# Patient Record
Sex: FEMALE | Race: ASIAN | NOT HISPANIC OR LATINO | Employment: UNEMPLOYED | URBAN - METROPOLITAN AREA
[De-identification: names, ages, dates, MRNs, and addresses within clinical notes are randomized per-mention and may not be internally consistent; named-entity substitution may affect disease eponyms.]

---

## 2018-06-25 ENCOUNTER — OFFICE VISIT (OUTPATIENT)
Dept: URGENT CARE | Facility: URGENT CARE | Age: 58
End: 2018-06-25

## 2018-06-25 VITALS
TEMPERATURE: 98 F | HEART RATE: 96 BPM | DIASTOLIC BLOOD PRESSURE: 80 MMHG | OXYGEN SATURATION: 96 % | RESPIRATION RATE: 24 BRPM | SYSTOLIC BLOOD PRESSURE: 104 MMHG | WEIGHT: 168.38 LBS

## 2018-06-25 DIAGNOSIS — R05.8 PRODUCTIVE COUGH: Primary | ICD-10-CM

## 2018-06-25 PROCEDURE — 99203 OFFICE O/P NEW LOW 30 MIN: CPT | Performed by: FAMILY MEDICINE

## 2018-06-25 RX ORDER — CODEINE PHOSPHATE AND GUAIFENESIN 10; 100 MG/5ML; MG/5ML
1-2 SOLUTION ORAL EVERY 6 HOURS PRN
Qty: 120 ML | Refills: 0 | Status: SHIPPED | OUTPATIENT
Start: 2018-06-25 | End: 2018-06-28

## 2018-06-25 RX ORDER — AZITHROMYCIN 250 MG/1
TABLET, FILM COATED ORAL
Qty: 6 TABLET | Refills: 0 | Status: SHIPPED | OUTPATIENT
Start: 2018-06-25 | End: 2018-06-30

## 2018-06-25 NOTE — PROGRESS NOTES
SUBJECTIVE: Christ Alford is a 58 year old female presenting with a chief complaint of nasal congestion and cough .  Onset of symptoms was 2 week(s) ago.  Course of illness is worsening.    Severity moderate  Current and Associated symptoms: runny nose, stuffy nose and cough - productive  Treatment measures tried include Tylenol/Ibuprofen.  Predisposing factors include None.    No past medical history on file.  No Known Allergies  Social History   Substance Use Topics     Smoking status: Never Smoker     Smokeless tobacco: Never Used     Alcohol use Not on file       ROS:  SKIN: no rash  GI: no vomiting    OBJECTIVE:  /80 (Cuff Size: Adult Regular)  Pulse 96  Temp 98  F (36.7  C)  Resp 24  Wt 168 lb 6 oz (76.4 kg)  SpO2 96%GENERAL APPEARANCE: healthy, alert and no distress  EYES: EOMI,  PERRL, conjunctiva clear  HENT: ear canals and TM's normal.  Nose and mouth without ulcers, erythema or lesions  NECK: supple, nontender, no lymphadenopathy  RESP: lungs clear to auscultation - no rales, rhonchi or wheezes  SKIN: no suspicious lesions or rashes      ICD-10-CM    1. Productive cough R05 azithromycin (ZITHROMAX) 250 MG tablet     guaiFENesin-codeine (ROBITUSSIN AC) 100-10 MG/5ML SOLN solution       Fluids/Rest, f/u if worse/not any better

## 2018-06-25 NOTE — MR AVS SNAPSHOT
After Visit Summary   6/25/2018    Christ Alford    MRN: [id number]           Patient Information     Date Of Birth          1960        Visit Information        Provider Department      6/25/2018 9:05 AM Gary Elliott,  [facility]        Today's Diagnoses     Productive cough    -  1       Follow-ups after your visit        Who to contact     If you have questions or need follow up information about today's clinic visit or your schedule please contact [facility] directly at [phone].  Normal or non-critical lab and imaging results will be communicated to you by MyChart, letter or phone within 4 business days after the clinic has received the results. If you do not hear from us within 7 days, please contact the clinic through MyChart or phone. If you have a critical or abnormal lab result, we will notify you by phone as soon as possible.  Submit refill requests through [employer] or call your pharmacy and they will forward the refill request to us. Please allow 3 business days for your refill to be completed.          Additional Information About Your Visit        Care EveryWhere ID     This is your Care EveryWhere ID. This could be used by other organizations to access your [place] medical records  [id number]        Your Vitals Were     Pulse Temperature Respirations Pulse Oximetry          96 98  F (36.7  C) 24 96%         Blood Pressure from Last 3 Encounters:   06/25/18 104/80    Weight from Last 3 Encounters:   06/25/18 168 lb 6 oz (76.4 kg)              Today, you had the following     No orders found for display         Today's Medication Changes          These changes are accurate as of 6/25/18  9:43 AM.  If you have any questions, ask your nurse or doctor.               Start taking these medicines.        Dose/Directions    azithromycin 250 MG tablet   Commonly known as:  ZITHROMAX   Used for:  Productive cough   Started  by:  Gary Elliott, DO        Two tablets first day, then one tablet daily for four days.   Quantity:  6 tablet   Refills:  0       guaiFENesin-codeine 100-10 MG/5ML Soln solution   Commonly known as:  ROBITUSSIN AC   Used for:  Productive cough   Started by:  Gary Elliott, DO        Dose:  1-2 tsp.   Take 5-10 mLs by mouth every 6 hours as needed for cough   Quantity:  120 mL   Refills:  0            Where to get your medicines      These medications were sent to [facility] [address] [address]     Phone:  [phone]     azithromycin 250 MG tablet         Some of these will need a paper prescription and others can be bought over the counter.  Ask your nurse if you have questions.     Bring a paper prescription for each of these medications     guaiFENesin-codeine 100-10 MG/5ML Soln solution                Primary Care Provider Fax #    Physician No Ref-Primary [phone]       No address on file        Equal Access to Services     CECILE PARSON : Hadii mercedes ku hadasho Soomaali, waaxda luqadaha, qaybta kaalmada adeegyada, waxay idiin hayarmando reyes . So [facility] [phone].    ATENCIÓN: Si habla español, tiene a luis disposición servicios gratuitos de asistencia lingüística. [facility] [phone].    We comply with applicable federal civil rights laws and [place] laws. We do not discriminate on the basis of race, color, national origin, age, disability, sex, sexual orientation, or gender identity.            Thank you!     Thank you for choosing [facility]  for your care. Our goal is always to provide you with excellent care. Hearing back from our patients is one way we can continue to improve our services. Please take a few minutes to complete the written survey that you may receive in the mail after your visit with us. Thank you!             Your Updated Medication List - Protect others around  you: Learn how to safely use, store and throw away your medicines at www.disposemymeds.org.          This list is accurate as of 6/25/18  9:43 AM.  Always use your most recent med list.                   Brand Name Dispense Instructions for use Diagnosis    azithromycin 250 MG tablet    ZITHROMAX    6 tablet    Two tablets first day, then one tablet daily for four days.    Productive cough       guaiFENesin-codeine 100-10 MG/5ML Soln solution    ROBITUSSIN AC    120 mL    Take 5-10 mLs by mouth every 6 hours as needed for cough    Productive cough

## 2018-07-01 ENCOUNTER — RADIANT APPOINTMENT (OUTPATIENT)
Dept: GENERAL RADIOLOGY | Facility: CLINIC | Age: 58
End: 2018-07-01
Attending: FAMILY MEDICINE

## 2018-07-01 ENCOUNTER — OFFICE VISIT (OUTPATIENT)
Dept: URGENT CARE | Facility: URGENT CARE | Age: 58
End: 2018-07-01

## 2018-07-01 VITALS
DIASTOLIC BLOOD PRESSURE: 60 MMHG | SYSTOLIC BLOOD PRESSURE: 110 MMHG | RESPIRATION RATE: 24 BRPM | HEART RATE: 55 BPM | OXYGEN SATURATION: 95 % | HEIGHT: 64 IN | TEMPERATURE: 98.2 F | WEIGHT: 168.6 LBS | BODY MASS INDEX: 28.79 KG/M2

## 2018-07-01 DIAGNOSIS — R05.9 COUGH: Primary | ICD-10-CM

## 2018-07-01 DIAGNOSIS — R10.13 ABDOMINAL PAIN, EPIGASTRIC: ICD-10-CM

## 2018-07-01 DIAGNOSIS — E86.0 DEHYDRATION: ICD-10-CM

## 2018-07-01 DIAGNOSIS — R17 TOTAL BILIRUBIN, ELEVATED: ICD-10-CM

## 2018-07-01 DIAGNOSIS — R11.2 NAUSEA AND VOMITING, INTRACTABILITY OF VOMITING NOT SPECIFIED, UNSPECIFIED VOMITING TYPE: ICD-10-CM

## 2018-07-01 LAB
ALBUMIN SERPL-MCNC: 3.9 G/DL (ref 3.4–5)
ALP SERPL-CCNC: 123 U/L (ref 40–150)
ALT SERPL W P-5'-P-CCNC: 81 U/L (ref 0–50)
AMYLASE SERPL-CCNC: 48 U/L (ref 30–110)
ANION GAP SERPL CALCULATED.3IONS-SCNC: 8 MMOL/L (ref 3–14)
AST SERPL W P-5'-P-CCNC: 41 U/L (ref 0–45)
BASOPHILS # BLD AUTO: 0 10E9/L (ref 0–0.2)
BASOPHILS NFR BLD AUTO: 0.3 %
BILIRUB SERPL-MCNC: 1.4 MG/DL (ref 0.2–1.3)
BUN SERPL-MCNC: 17 MG/DL (ref 7–30)
CALCIUM SERPL-MCNC: 9 MG/DL (ref 8.5–10.1)
CHLORIDE SERPL-SCNC: 112 MMOL/L (ref 94–109)
CO2 SERPL-SCNC: 26 MMOL/L (ref 20–32)
CREAT SERPL-MCNC: 1.06 MG/DL (ref 0.52–1.04)
DIFFERENTIAL METHOD BLD: NORMAL
EOSINOPHIL # BLD AUTO: 0.1 10E9/L (ref 0–0.7)
EOSINOPHIL NFR BLD AUTO: 0.6 %
ERYTHROCYTE [DISTWIDTH] IN BLOOD BY AUTOMATED COUNT: 14.2 % (ref 10–15)
GFR SERPL CREATININE-BSD FRML MDRD: 53 ML/MIN/1.7M2
GLUCOSE SERPL-MCNC: 115 MG/DL (ref 70–99)
HCT VFR BLD AUTO: 45.1 % (ref 35–47)
HGB BLD-MCNC: 14.2 G/DL (ref 11.7–15.7)
LIPASE SERPL-CCNC: 174 U/L (ref 73–393)
LYMPHOCYTES # BLD AUTO: 2.4 10E9/L (ref 0.8–5.3)
LYMPHOCYTES NFR BLD AUTO: 26.5 %
MCH RBC QN AUTO: 30.5 PG (ref 26.5–33)
MCHC RBC AUTO-ENTMCNC: 31.5 G/DL (ref 31.5–36.5)
MCV RBC AUTO: 97 FL (ref 78–100)
MONOCYTES # BLD AUTO: 0.4 10E9/L (ref 0–1.3)
MONOCYTES NFR BLD AUTO: 4.8 %
NEUTROPHILS # BLD AUTO: 6 10E9/L (ref 1.6–8.3)
NEUTROPHILS NFR BLD AUTO: 67.8 %
PLATELET # BLD AUTO: 234 10E9/L (ref 150–450)
POTASSIUM SERPL-SCNC: 5.3 MMOL/L (ref 3.4–5.3)
PROT SERPL-MCNC: 7.6 G/DL (ref 6.8–8.8)
RBC # BLD AUTO: 4.66 10E12/L (ref 3.8–5.2)
SODIUM SERPL-SCNC: 146 MMOL/L (ref 133–144)
WBC # BLD AUTO: 8.9 10E9/L (ref 4–11)

## 2018-07-01 PROCEDURE — 83690 ASSAY OF LIPASE: CPT | Performed by: FAMILY MEDICINE

## 2018-07-01 PROCEDURE — 71046 X-RAY EXAM CHEST 2 VIEWS: CPT | Mod: FY

## 2018-07-01 PROCEDURE — 85025 COMPLETE CBC W/AUTO DIFF WBC: CPT | Performed by: FAMILY MEDICINE

## 2018-07-01 PROCEDURE — 80053 COMPREHEN METABOLIC PANEL: CPT | Performed by: FAMILY MEDICINE

## 2018-07-01 PROCEDURE — 36415 COLL VENOUS BLD VENIPUNCTURE: CPT | Performed by: FAMILY MEDICINE

## 2018-07-01 PROCEDURE — 82150 ASSAY OF AMYLASE: CPT | Performed by: FAMILY MEDICINE

## 2018-07-01 PROCEDURE — 99214 OFFICE O/P EST MOD 30 MIN: CPT | Performed by: FAMILY MEDICINE

## 2018-07-01 RX ORDER — ONDANSETRON 8 MG/1
8 TABLET, FILM COATED ORAL EVERY 8 HOURS PRN
Qty: 15 TABLET | Refills: 0 | Status: SHIPPED | OUTPATIENT
Start: 2018-07-01 | End: 2018-07-06

## 2018-07-01 NOTE — MR AVS SNAPSHOT
"              After Visit Summary   7/1/2018    Christ Servin    MRN: [id number]           Patient Information     Date Of Birth          1960        Visit Information        Provider Department      7/1/2018 9:40 AM Gary Elliott DO [facility]        Today's Diagnoses     Cough    -  1    Nausea and vomiting, intractability of vomiting not specified, unspecified vomiting type        Abdominal pain, epigastric        Total bilirubin, elevated        Dehydration           Follow-ups after your visit        Who to contact     If you have questions or need follow up information about today's clinic visit or your schedule please contact [facility] directly at [phone].  Normal or non-critical lab and imaging results will be communicated to you by MyChart, letter or phone within 4 business days after the clinic has received the results. If you do not hear from us within 7 days, please contact the clinic through MyChart or phone. If you have a critical or abnormal lab result, we will notify you by phone as soon as possible.  Submit refill requests through [employer] or call your pharmacy and they will forward the refill request to us. Please allow 3 business days for your refill to be completed.          Additional Information About Your Visit        Care EveryWhere ID     This is your Care EveryWhere ID. This could be used by other organizations to access your [place] medical records  [id number]        Your Vitals Were     Pulse Temperature Respirations Height Pulse Oximetry BMI (Body Mass Index)    55 98.2  F (36.8  C) 24 5' 4\" (1.626 m) 95% 28.94 kg/m2       Blood Pressure from Last 3 Encounters:   07/01/18 110/60   06/25/18 104/80    Weight from Last 3 Encounters:   07/01/18 168 lb 9.6 oz (76.5 kg)   06/25/18 168 lb 6 oz (76.4 kg)              We Performed the Following     Amylase     CBC with platelets differential     Comprehensive metabolic " panel (BMP + Alb, Alk Phos, ALT, AST, Total. Bili, TP)     Lipase     XR Chest 2 Views          Today's Medication Changes          These changes are accurate as of 7/1/18 10:47 AM.  If you have any questions, ask your nurse or doctor.               Start taking these medicines.        Dose/Directions    ondansetron 8 MG tablet   Commonly known as:  ZOFRAN   Used for:  Nausea and vomiting, intractability of vomiting not specified, unspecified vomiting type, Abdominal pain, epigastric, Dehydration   Started by:  Gary Elliott DO        Dose:  8 mg   Take 1 tablet (8 mg) by mouth every 8 hours as needed for nausea   Quantity:  15 tablet   Refills:  0            Where to get your medicines      These medications were sent to [facility] [address] [address]     Phone:  [phone]     ondansetron 8 MG tablet                Primary Care Provider Fax #    Physician No Ref-Primary [phone]       No address on file        Equal Access to Services     CECILE PARSON : Hadii aad ku hadasho Soomaali, waaxda luqadaha, qaybta kaalmada adeegyada, waxay idiin hayaan adeeg kharamathew reyes . So [facility] [phone].    ATENCIÓN: Si habla español, tiene a luis disposición servicios gratuitos de asistencia lingüística. Ariellaame al [phone].    We comply with applicable federal civil rights laws and [place] laws. We do not discriminate on the basis of race, color, national origin, age, disability, sex, sexual orientation, or gender identity.            Thank you!     Thank you for choosing [facility]  for your care. Our goal is always to provide you with excellent care. Hearing back from our patients is one way we can continue to improve our services. Please take a few minutes to complete the written survey that you may receive in the mail after your visit with us. Thank you!             Your Updated Medication List - Protect  others around you: Learn how to safely use, store and throw away your medicines at www.disposemymeds.org.          This list is accurate as of 7/1/18 10:47 AM.  Always use your most recent med list.                   Brand Name Dispense Instructions for use Diagnosis    bismuth subsalicylate 262 MG/15ML suspension    PEPTO BISMOL     Take 15 mLs by mouth every 6 hours as needed for indigestion        ondansetron 8 MG tablet    ZOFRAN    15 tablet    Take 1 tablet (8 mg) by mouth every 8 hours as needed for nausea    Nausea and vomiting, intractability of vomiting not specified, unspecified vomiting type, Abdominal pain, epigastric, Dehydration

## 2018-07-01 NOTE — PROGRESS NOTES
"SUBJECTIVE: Christ Servin is a 58 year old female presenting with a chief complaint of improved cough but now with N/V and epigastric abd pain.  Had taken Prilosec for epigastric pain but off now..  Onset of symptoms was day(s) ago.  Course of illness is same.    Severity moderate  Current and Associated symptoms: none  Treatment measures tried include z pack for cough several days ago which helped.  Predisposing factors include HX of Gastritis and use of Prilosec.    No past medical history on file.    Past Surgical History:   Procedure Laterality Date     CHOLECYSTECTOMY         No family history on file.    Social History   Substance Use Topics     Smoking status: Never Smoker     Smokeless tobacco: Never Used     Alcohol use Not on file     ROS:  SKIN: no rash  GI: no diarrhea    OBJECTIVE:  /60  Pulse 55  Temp 98.2  F (36.8  C)  Resp 24  Ht 5' 4\" (1.626 m)  Wt 168 lb 9.6 oz (76.5 kg)  SpO2 95%  BMI 28.94 kg/m2   GENERAL APPEARANCE: healthy, alert and no distress  EYES: EOMI,  PERRL, conjunctiva clear  HENT: ear canals and TM's normal.  Nose and mouth without ulcers, erythema or lesions  NECK: supple, nontender, no lymphadenopathy  RESP: lungs clear to auscultation - no rales, rhonchi or wheezes  ABDOMEN: soft, normal bowel sounds, tenderness mild and moderate epigastric, no guarding/rigidity/rebound  SKIN: no suspicious lesions or rashes    Study Result   CHEST TWO VIEWS  7/1/2018 10:33 AM      HISTORY: Cough; nausea and vomiting, intractability of vomiting not  specified, unspecified vomiting type; abdominal pain, epigastric.     COMPARISON: Hypoinflated lungs. Bibasilar atelectasis. Difficult to  exclude bibasilar airspace disease. Unremarkable cardiac silhouette.         IMPRESSION: No acute cardiopulmonary disease.         ICD-10-CM    1. Cough R05 CBC with platelets differential   2. Nausea and vomiting, intractability of vomiting not specified, unspecified vomiting type R11.2 CBC with " platelets differential     Amylase     Lipase     Comprehensive metabolic panel (BMP + Alb, Alk Phos, ALT, AST, Total. Bili, TP)   3. Abdominal pain, epigastric R10.13 CBC with platelets differential     Amylase     Lipase     Comprehensive metabolic panel (BMP + Alb, Alk Phos, ALT, AST, Total. Bili, TP)     Pt will restart Prilosec  Fluids/Rest, f/u recheck

## 2018-07-05 ENCOUNTER — OFFICE VISIT (OUTPATIENT)
Dept: INTERNAL MEDICINE | Facility: CLINIC | Age: 58
End: 2018-07-05
Payer: MEDICAID

## 2018-07-05 VITALS
OXYGEN SATURATION: 92 % | BODY MASS INDEX: 29.58 KG/M2 | SYSTOLIC BLOOD PRESSURE: 106 MMHG | DIASTOLIC BLOOD PRESSURE: 80 MMHG | RESPIRATION RATE: 20 BRPM | HEART RATE: 90 BPM | WEIGHT: 172.3 LBS | TEMPERATURE: 97.9 F

## 2018-07-05 DIAGNOSIS — R05.9 COUGH: Primary | ICD-10-CM

## 2018-07-05 DIAGNOSIS — K21.9 GASTROESOPHAGEAL REFLUX DISEASE WITHOUT ESOPHAGITIS: ICD-10-CM

## 2018-07-05 DIAGNOSIS — R94.5 ABNORMAL RESULTS OF LIVER FUNCTION STUDIES: ICD-10-CM

## 2018-07-05 PROCEDURE — 99214 OFFICE O/P EST MOD 30 MIN: CPT | Performed by: INTERNAL MEDICINE

## 2018-07-05 RX ORDER — ALBUTEROL SULFATE 90 UG/1
2 AEROSOL, METERED RESPIRATORY (INHALATION) EVERY 6 HOURS PRN
Qty: 3 INHALER | Refills: 1 | Status: SHIPPED | OUTPATIENT
Start: 2018-07-05

## 2018-07-05 RX ORDER — METHYLPREDNISOLONE 4 MG
TABLET, DOSE PACK ORAL
Qty: 21 TABLET | Refills: 0 | Status: ON HOLD | OUTPATIENT
Start: 2018-07-05 | End: 2018-07-26

## 2018-07-05 NOTE — PROGRESS NOTES
SUBJECTIVE:   Christ Servin is a 58 year old female who presents to clinic today for the following health issues:    New patient. Patient moved to [place] from [place] last yr, states she is not looking to establish care today but is just following up from urgent care.  Patient is accompanied by her significant other as the patient speaks somewhat broken English and is from I believe the [facility].    Patient was seen in the clinic on June 25 and given an empiric course of Zithromax which she completed.  She then returned to the urgent care on July 1 and had the below evaluation done.  She primarily has been complaining of a little bit of a cough.  Cough is associated with a feeling of windedness at time which she states has been chronic.  She also states that she has been taking omeprazole and has had some success in controlling some reflux symptoms with the use of this medicine.    ED/UC Followup:    Facility:  [facility]  Date of visit: 7/1/18  Reason for visit: Cough, nausea, vomiting   Current Status: Patient still experiencing occ. shortness of breath and epigastric pain is improved with PPI use. Improved nausea/ vomiting with Zofran also.       Study Result   CHEST TWO VIEWS  7/1/2018 10:33 AM       HISTORY: Cough; nausea and vomiting, intractability of vomiting not  specified, unspecified vomiting type; abdominal pain, epigastric.      COMPARISON: Hypoinflated lungs. Bibasilar atelectasis. Difficult to  exclude bibasilar airspace disease. Unremarkable cardiac silhouette.          IMPRESSION: No acute cardiopulmonary disease.             ICD-10-CM     1. Cough R05 CBC with platelets differential   2. Nausea and vomiting, intractability of vomiting not specified, unspecified vomiting type R11.2 CBC with platelets differential       Amylase       Lipase       Comprehensive metabolic panel (BMP + Alb, Alk Phos, ALT, AST, Total. Bili, TP)   3. Abdominal pain, epigastric R10.13 CBC with platelets differential        Amylase       Lipase       Comprehensive metabolic panel (BMP + Alb, Alk Phos, ALT, AST, Total. Bili, TP)     Problem list and histories reviewed & adjusted, as indicated.  Additional history: as documented    There is no problem list on file for this patient.    Past Surgical History:   Procedure Laterality Date     CHOLECYSTECTOMY         Social History   Substance Use Topics     Smoking status: Never Smoker     Smokeless tobacco: Never Used     Alcohol use Not on file     No family history on file.      Current Outpatient Prescriptions   Medication Sig Dispense Refill     bismuth subsalicylate (PEPTO BISMOL) 262 MG/15ML suspension Take 15 mLs by mouth every 6 hours as needed for indigestion       ondansetron (ZOFRAN) 8 MG tablet Take 1 tablet (8 mg) by mouth every 8 hours as needed for nausea 15 tablet 0     No Known Allergies  BP Readings from Last 3 Encounters:   07/01/18 110/60   06/25/18 104/80    Wt Readings from Last 3 Encounters:   07/01/18 168 lb 9.6 oz (76.5 kg)   06/25/18 168 lb 6 oz (76.4 kg)               Reviewed and updated as needed this visit by clinical staff       Reviewed and updated as needed this visit by Provider         ROS:  CONSTITUTIONAL: NEGATIVE for fever, chills, change in weight  ENT/MOUTH: NEGATIVE for ear, mouth and throat problems  RESP: NEGATIVE for significant SOB  CV: NEGATIVE for chest pain, palpitations or peripheral edema  GI: NEGATIVE for nausea, abdominal pain, heartburn, or change in bowel habits  : NEGATIVE for frequency, dysuria, or hematuria  MUSCULOSKELETAL: NEGATIVE for significant arthralgias or myalgia  NEURO: NEGATIVE for weakness, dizziness or paresthesias  HEME: NEGATIVE for bleeding problems  PSYCHIATRIC: NEGATIVE for changes in mood or affect    OBJECTIVE:                                                    /80  Pulse 90  Temp 97.9  F (36.6  C) (Oral)  Resp 20  Wt 172 lb 4.8 oz (78.2 kg)  SpO2 92%  Breastfeeding? No  BMI 29.58 kg/m2  Body mass  index is 29.58 kg/(m^2).  GENERAL: healthy, alert and no distress  RESP: lungs clear to auscultation - no rales, no rhonchi, noted occ.  wheezes  CV: regular rates and rhythm, normal S1 S2, no S3 or S4 and no murmur, no click or rub -  MS: extremities- no gross deformities noted, no edema  NEURO: no focal changes  PSYCH: Alert and oriented times 3; speech- coherent , normal rate and volume; able to articulate logical thoughts, able to abstract reason, no tangential thoughts, no hallucinations or delusions, affect- normal     ASSESSMENT/PLAN:                                                      (R05) Cough  (primary encounter diagnosis)  Comment: Appears as more of a postinfectious cough.  I have suggested we start with an albuterol inhaler and a Medrol Dosepak and see how she responds clinically.  Plan: methylPREDNISolone (MEDROL DOSEPAK) 4 MG         tablet, albuterol (PROAIR HFA/PROVENTIL         HFA/VENTOLIN HFA) 108 (90 Base) MCG/ACT Inhaler            (K21.9) Gastroesophageal reflux disease without esophagitis  Comment: Continuing with PPI use and omeprazole as ordered  Plan:     (R94.5) Abnormal results of liver function studies  Comment: I informed the patient that she needs to get follow-up labs including kidney and liver function test.  Plan: Comprehensive metabolic panel        Patient states that she is unsure that she will be establishing primary care here but I have placed orders for her nonetheless.      See Patient Instructions as she has been advised about the need for primary care follow-ups in regards to mammograms, Pap smears, colonoscopies and annual physical exams with routine screening    Gary Bunch MD  [facility]    THE MEDICATION LIST HAS BEEN FULLY RECONCILED BY THE M.D. AND THE NURSING STAFF.    25 minutes spent with this patient, face to face, discussing treatment options for listed problems above as well as side effects of appropriate medications.   Counseling time extended beyond 50% of the clinic visit.  Medication dosing, treatment plan and follow-up were discussed. Also reviewed need for primary care testing for patient.

## 2018-07-05 NOTE — MR AVS SNAPSHOT
After Visit Summary   7/5/2018    Christ Servin    MRN: [id number]           Patient Information     Date Of Birth          1960        Visit Information        Provider Department      7/5/2018 7:00 AM Gary Bunch MD [facility]        Today's Diagnoses     Cough    -  1    Gastroesophageal reflux disease without esophagitis        Abnormal results of liver function studies           Follow-ups after your visit        Follow-up notes from your care team     Return if symptoms worsen or fail to improve.      Future tests that were ordered for you today     Open Future Orders        Priority Expected Expires Ordered    Comprehensive metabolic panel Routine 7/5/2018 7/31/2018 7/5/2018            Who to contact     If you have questions or need follow up information about today's clinic visit or your schedule please contact [facility] directly at [phone].  Normal or non-critical lab and imaging results will be communicated to you by MyChart, letter or phone within 4 business days after the clinic has received the results. If you do not hear from us within 7 days, please contact the clinic through MyChart or phone. If you have a critical or abnormal lab result, we will notify you by phone as soon as possible.  Submit refill requests through [employer] or call your pharmacy and they will forward the refill request to us. Please allow 3 business days for your refill to be completed.          Additional Information About Your Visit        Care EveryWhere ID     This is your Care EveryWhere ID. This could be used by other organizations to access your [place] medical records  [id number]        Your Vitals Were     Pulse Temperature Respirations Pulse Oximetry Breastfeeding? BMI (Body Mass Index)    90 97.9  F (36.6  C) (Oral) 20 92% No 29.58 kg/m2       Blood Pressure from Last 3 Encounters:   07/05/18 106/80   07/01/18 110/60   06/25/18 104/80     Weight from Last 3 Encounters:   07/05/18 172 lb 4.8 oz (78.2 kg)   07/01/18 168 lb 9.6 oz (76.5 kg)   06/25/18 168 lb 6 oz (76.4 kg)                 Today's Medication Changes          These changes are accurate as of 7/5/18  7:19 AM.  If you have any questions, ask your nurse or doctor.               Start taking these medicines.        Dose/Directions    albuterol 108 (90 Base) MCG/ACT Inhaler   Commonly known as:  PROAIR HFA/PROVENTIL HFA/VENTOLIN HFA   Used for:  Cough   Started by:  Gary Bunch MD        Dose:  2 puff   Inhale 2 puffs into the lungs every 6 hours as needed for shortness of breath / dyspnea or wheezing   Quantity:  3 Inhaler   Refills:  1       methylPREDNISolone 4 MG tablet   Commonly known as:  MEDROL DOSEPAK   Used for:  Cough   Started by:  Gary Bunch MD        Follow package instructions   Quantity:  21 tablet   Refills:  0            Where to get your medicines      These medications were sent to [facility] [address] [address]     Phone:  [phone]     albuterol 108 (90 Base) MCG/ACT Inhaler    methylPREDNISolone 4 MG tablet                Primary Care Provider Fax #    Physician No Ref-Primary [phone]       No address on file        Equal Access to Services     CECILE PARSON AH: Hadii mercedes martino Soarian, waaxda luqadaha, qaybta kaalmada adeegyada, geena moscoso hayarmando reyes . So [facility] [phone].    ATENCIÓN: Si habla español, tiene a luis disposición servicios gratuitos de asistencia lingüística. Llame al [phone].    We comply with applicable federal civil rights laws and [place] laws. We do not discriminate on the basis of race, color, national origin, age, disability, sex, sexual orientation, or gender identity.            Thank you!     Thank you for choosing [facility]  for your care. Our goal is always to provide you with excellent care. Hearing  back from our patients is one way we can continue to improve our services. Please take a few minutes to complete the written survey that you may receive in the mail after your visit with us. Thank you!             Your Updated Medication List - Protect others around you: Learn how to safely use, store and throw away your medicines at www.disposemymeds.org.          This list is accurate as of 7/5/18  7:19 AM.  Always use your most recent med list.                   Brand Name Dispense Instructions for use Diagnosis    albuterol 108 (90 Base) MCG/ACT Inhaler    PROAIR HFA/PROVENTIL HFA/VENTOLIN HFA    3 Inhaler    Inhale 2 puffs into the lungs every 6 hours as needed for shortness of breath / dyspnea or wheezing    Cough       bismuth subsalicylate 262 MG/15ML suspension    PEPTO BISMOL     Take 15 mLs by mouth every 6 hours as needed for indigestion        methylPREDNISolone 4 MG tablet    MEDROL DOSEPAK    21 tablet    Follow package instructions    Cough       ondansetron 8 MG tablet    ZOFRAN    15 tablet    Take 1 tablet (8 mg) by mouth every 8 hours as needed for nausea    Nausea and vomiting, intractability of vomiting not specified, unspecified vomiting type, Abdominal pain, epigastric, Dehydration

## 2018-07-23 ENCOUNTER — APPOINTMENT (OUTPATIENT)
Dept: ULTRASOUND IMAGING | Facility: CLINIC | Age: 58
End: 2018-07-23
Attending: EMERGENCY MEDICINE
Payer: MEDICAID

## 2018-07-23 ENCOUNTER — APPOINTMENT (OUTPATIENT)
Dept: CT IMAGING | Facility: CLINIC | Age: 58
End: 2018-07-23
Attending: EMERGENCY MEDICINE
Payer: MEDICAID

## 2018-07-23 ENCOUNTER — HOSPITAL ENCOUNTER (EMERGENCY)
Facility: CLINIC | Age: 58
Discharge: HOME | End: 2018-07-23
Attending: EMERGENCY MEDICINE | Admitting: EMERGENCY MEDICINE
Payer: MEDICAID

## 2018-07-23 VITALS
BODY MASS INDEX: 28.68 KG/M2 | SYSTOLIC BLOOD PRESSURE: 127 MMHG | OXYGEN SATURATION: 94 % | DIASTOLIC BLOOD PRESSURE: 112 MMHG | WEIGHT: 168 LBS | RESPIRATION RATE: 18 BRPM | HEIGHT: 64 IN | TEMPERATURE: 97.7 F

## 2018-07-23 DIAGNOSIS — R74.8 ELEVATED LIVER ENZYMES: ICD-10-CM

## 2018-07-23 DIAGNOSIS — R18.8 OTHER ASCITES: ICD-10-CM

## 2018-07-23 DIAGNOSIS — R10.84 ABDOMINAL PAIN, GENERALIZED: ICD-10-CM

## 2018-07-23 LAB
ALBUMIN SERPL-MCNC: 3.6 G/DL (ref 3.4–5)
ALP SERPL-CCNC: 117 U/L (ref 40–150)
ALT SERPL W P-5'-P-CCNC: 64 U/L (ref 0–50)
ANION GAP SERPL CALCULATED.3IONS-SCNC: 10 MMOL/L (ref 3–14)
AST SERPL W P-5'-P-CCNC: 41 U/L (ref 0–45)
BASOPHILS # BLD AUTO: 0 10E9/L (ref 0–0.2)
BASOPHILS NFR BLD AUTO: 0.4 %
BILIRUB SERPL-MCNC: 1.8 MG/DL (ref 0.2–1.3)
BUN SERPL-MCNC: 18 MG/DL (ref 7–30)
CALCIUM SERPL-MCNC: 8.3 MG/DL (ref 8.5–10.1)
CHLORIDE SERPL-SCNC: 111 MMOL/L (ref 94–109)
CO2 SERPL-SCNC: 21 MMOL/L (ref 20–32)
CREAT SERPL-MCNC: 1.04 MG/DL (ref 0.52–1.04)
DIFFERENTIAL METHOD BLD: NORMAL
EOSINOPHIL # BLD AUTO: 0.1 10E9/L (ref 0–0.7)
EOSINOPHIL NFR BLD AUTO: 0.7 %
ERYTHROCYTE [DISTWIDTH] IN BLOOD BY AUTOMATED COUNT: 14.3 % (ref 10–15)
GFR SERPL CREATININE-BSD FRML MDRD: 55 ML/MIN/1.7M2
GLUCOSE SERPL-MCNC: 145 MG/DL (ref 70–99)
HCT VFR BLD AUTO: 45.5 % (ref 35–47)
HGB BLD-MCNC: 14.8 G/DL (ref 11.7–15.7)
IMM GRANULOCYTES # BLD: 0 10E9/L (ref 0–0.4)
IMM GRANULOCYTES NFR BLD: 0.3 %
LIPASE SERPL-CCNC: 142 U/L (ref 73–393)
LYMPHOCYTES # BLD AUTO: 2 10E9/L (ref 0.8–5.3)
LYMPHOCYTES NFR BLD AUTO: 25.9 %
MCH RBC QN AUTO: 30.4 PG (ref 26.5–33)
MCHC RBC AUTO-ENTMCNC: 32.5 G/DL (ref 31.5–36.5)
MCV RBC AUTO: 93 FL (ref 78–100)
MONOCYTES # BLD AUTO: 0.4 10E9/L (ref 0–1.3)
MONOCYTES NFR BLD AUTO: 5.1 %
NEUTROPHILS # BLD AUTO: 5.1 10E9/L (ref 1.6–8.3)
NEUTROPHILS NFR BLD AUTO: 67.6 %
NRBC # BLD AUTO: 0 10*3/UL
NRBC BLD AUTO-RTO: 0 /100
PLATELET # BLD AUTO: 181 10E9/L (ref 150–450)
POTASSIUM SERPL-SCNC: 4.3 MMOL/L (ref 3.4–5.3)
PROT SERPL-MCNC: 7.3 G/DL (ref 6.8–8.8)
RBC # BLD AUTO: 4.87 10E12/L (ref 3.8–5.2)
SODIUM SERPL-SCNC: 142 MMOL/L (ref 133–144)
TROPONIN I SERPL-MCNC: <0.015 UG/L (ref 0–0.04)
WBC # BLD AUTO: 7.5 10E9/L (ref 4–11)

## 2018-07-23 PROCEDURE — 96374 THER/PROPH/DIAG INJ IV PUSH: CPT | Mod: 59

## 2018-07-23 PROCEDURE — 96376 TX/PRO/DX INJ SAME DRUG ADON: CPT

## 2018-07-23 PROCEDURE — 85025 COMPLETE CBC W/AUTO DIFF WBC: CPT | Performed by: EMERGENCY MEDICINE

## 2018-07-23 PROCEDURE — 76705 ECHO EXAM OF ABDOMEN: CPT

## 2018-07-23 PROCEDURE — 25000125 ZZHC RX 250: Performed by: EMERGENCY MEDICINE

## 2018-07-23 PROCEDURE — 96375 TX/PRO/DX INJ NEW DRUG ADDON: CPT

## 2018-07-23 PROCEDURE — 83690 ASSAY OF LIPASE: CPT | Performed by: EMERGENCY MEDICINE

## 2018-07-23 PROCEDURE — 74177 CT ABD & PELVIS W/CONTRAST: CPT

## 2018-07-23 PROCEDURE — 25000128 H RX IP 250 OP 636: Performed by: EMERGENCY MEDICINE

## 2018-07-23 PROCEDURE — 99285 EMERGENCY DEPT VISIT HI MDM: CPT | Mod: 25

## 2018-07-23 PROCEDURE — 96361 HYDRATE IV INFUSION ADD-ON: CPT

## 2018-07-23 PROCEDURE — 80053 COMPREHEN METABOLIC PANEL: CPT | Performed by: EMERGENCY MEDICINE

## 2018-07-23 PROCEDURE — 84484 ASSAY OF TROPONIN QUANT: CPT | Performed by: EMERGENCY MEDICINE

## 2018-07-23 RX ORDER — IOPAMIDOL 755 MG/ML
84 INJECTION, SOLUTION INTRAVASCULAR ONCE
Status: COMPLETED | OUTPATIENT
Start: 2018-07-23 | End: 2018-07-23

## 2018-07-23 RX ORDER — ONDANSETRON 2 MG/ML
4 INJECTION INTRAMUSCULAR; INTRAVENOUS ONCE
Status: COMPLETED | OUTPATIENT
Start: 2018-07-23 | End: 2018-07-23

## 2018-07-23 RX ORDER — HYDROMORPHONE HYDROCHLORIDE 1 MG/ML
0.5 INJECTION, SOLUTION INTRAMUSCULAR; INTRAVENOUS; SUBCUTANEOUS
Status: DISCONTINUED | OUTPATIENT
Start: 2018-07-23 | End: 2018-07-23 | Stop reason: HOSPADM

## 2018-07-23 RX ORDER — SODIUM CHLORIDE 9 MG/ML
1000 INJECTION, SOLUTION INTRAVENOUS CONTINUOUS
Status: DISCONTINUED | OUTPATIENT
Start: 2018-07-23 | End: 2018-07-23 | Stop reason: HOSPADM

## 2018-07-23 RX ORDER — ONDANSETRON 4 MG/1
4 TABLET, ORALLY DISINTEGRATING ORAL EVERY 8 HOURS PRN
Qty: 10 TABLET | Refills: 0 | Status: ON HOLD | OUTPATIENT
Start: 2018-07-23 | End: 2018-08-03

## 2018-07-23 RX ADMIN — SODIUM CHLORIDE 66 ML: 900 INJECTION, SOLUTION INTRAVENOUS at 13:51

## 2018-07-23 RX ADMIN — ONDANSETRON 4 MG: 2 INJECTION INTRAMUSCULAR; INTRAVENOUS at 10:28

## 2018-07-23 RX ADMIN — SODIUM CHLORIDE 1000 ML: 9 INJECTION, SOLUTION INTRAVENOUS at 10:28

## 2018-07-23 RX ADMIN — Medication 0.5 MG: at 10:28

## 2018-07-23 RX ADMIN — IOPAMIDOL 84 ML: 755 INJECTION, SOLUTION INTRAVENOUS at 13:51

## 2018-07-23 RX ADMIN — ONDANSETRON 4 MG: 2 INJECTION INTRAMUSCULAR; INTRAVENOUS at 11:46

## 2018-07-23 ASSESSMENT — ENCOUNTER SYMPTOMS
ABDOMINAL PAIN: 1
DIARRHEA: 1
SHORTNESS OF BREATH: 1
VOMITING: 1
NAUSEA: 1
CONSTIPATION: 1

## 2018-07-23 NOTE — ED AVS SNAPSHOT
Emergency Department    [address] [facility] [address]    Phone:  [phone]    Fax:  [phone]                                       Christ Servin   MRN: [id number]    Department:   Emergency Department   Date of Visit:  7/23/2018           After Visit Summary Signature Page     I have received my discharge instructions, and my questions have been answered. I have discussed any challenges I see with this plan with the nurse or doctor.    ..........................................................................................................................................  Patient/Patient Representative Signature      ..........................................................................................................................................  Patient Representative Print Name and Relationship to Patient    ..................................................               ................................................  Date                                            Time    ..........................................................................................................................................  Reviewed by Signature/Title    ...................................................              ..............................................  Date                                                            Time

## 2018-07-23 NOTE — ED PROVIDER NOTES
"  History     Chief Complaint:  Abdominal Pain    HPI   Christ Servin is a 58 year old female with a history of cholecystectomy who presents to the emergency department today for evaluation of abdominal pain. The patient reports that she has been experiencing an intermittent epigastric abdominal pain that radiates into her back, is exacerbated with walking, standing, and laying down, and alleviated when sitting for approximately one month. She explains that her pains have increased in frequency and intensity as of recent, with a maximum intensity of 8/10 and a minimum of 2/10. In addition to her pain, the patient reports episodes of chest pain and accompanied shortness of breath, pale diarrhea, and constipation, with no change in pain. The patient reports that yesterday she also developed yellow emesis. Of note, the patient was seen on 7/5/18 at urgent care, at which time it was noted that she had abnormal liver function tests with shortness of breath. She was provided an inhaler at that time.     Allergies:  No Known Drug Allergies     Medications:    Albuterol    Past Medical History:    GERD    Past Surgical History:    Cholecystectomy    Family History:    Family history reviewed. No pertinent family history.    Social History:  The patient was accompanied to the ED by friend.  Smoking Status: Never Smoker  Smokeless Tobacco: Never Used  Alcohol Use: Negative  Marital Status:  Single    Review of Systems   Respiratory: Positive for shortness of breath.    Cardiovascular: Positive for chest pain.   Gastrointestinal: Positive for abdominal pain, constipation, diarrhea, nausea and vomiting.   All other systems reviewed and are negative.    Physical Exam     Patient Vitals for the past 24 hrs:   BP Temp Temp src Heart Rate Resp SpO2 Height Weight   07/23/18 1444 (!) 127/112 - - 65 18 94 % - -   07/23/18 1226 - - - - - 94 % - -   07/23/18 1004 132/75 97.7  F (36.5  C) Oral 62 16 96 % 1.626 m (5' 4\") 76.2 kg (168 lb) "     Physical Exam  Nursing note and vitals reviewed.  General: Oriented to person, place, and time. Appears well-developed and well-nourished.   Head: No signs of trauma.   Mouth/Throat: Oropharynx is clear and moist.   Eyes: Conjunctivae are normal. Pupils are equal, round, and reactive to light.   Neck: Normal range of motion. No nuchal rigidity.   Cardiovascular: Normal rate and regular rhythm.    Respiratory: Effort normal and breath sounds normal. No respiratory distress.   Abdominal: Soft. Pain in epigastric region with palpation. There is no guarding.   Musculoskeletal: Normal range of motion. no edema.   Neurological: The patient is alert and oriented to person, place, and time.  PERRLA, EOMI, visual fields intact, strength in upper/lower extremities normal and symmetrical.   Sensation normal. Speech normal  GCS eye subscore is 4. GCS verbal subscore is 5. GCS motor subscore is 6.   Skin: Skin is warm and dry. No rash noted.   Psychiatric: normal mood and affect. behavior is normal.     Emergency Department Course     Imaging:  Radiology findings were communicated with the patient who voiced understanding of the findings.    US Abdomen Limited  1. Prior cholecystectomy.  2. Nonvisualization of the pancreas.  3. Incidentally noted right pleural effusion.  Reading per radiology    CT Abdomen Pelvis w Contrast  1. Mild ascites.  2. Colonic diverticulosis, without evidence for diverticulitis.  3. Bilateral pleural effusions, moderate on the right and small on the left.  Reading per radiology    Laboratory:  Laboratory findings were communicated with the patient who voiced understanding of the findings.    CBC: WBC 7.5, HGB 14.8,   CMP: Chloride 111 (H), Glucose 145 (H), GFR Estimate 55 (L), Calcium 8.3 (L), Bilirubin 1.8 (H), ALT 64 (H) o/w WNL (Creatinine 1.04)  Lipase: 142  Troponin (Collected: 1016): <0.015    Interventions:  1028 NS 1000 ml IV  1028 Dilaudid 0.5 mg IV  1028 Zofran 4 mg IV  1146 Zofran  4 mg IV    Emergency Department Course:    1007 Nursing notes and vitals reviewed.    1009 I performed an exam of the patient as documented above.     1016 IV was inserted and blood was drawn for laboratory testing, results above.    1057 The patient was sent for an ultrasound of the abdomen while in the emergency department, results above.     1303 Patient vomiting.     1349 The patient was sent for a CT of the abdomen and pelvis while in the emergency department, results above.     1456 I personally reviewed the laboratory and imaging results with the patient and answered all related questions prior to discharge.    Impression & Plan      Medical Decision Making:  Christ Servin is a 58 year old female who presents to the emergency department today for evaluation of upper abdominal pain as documented above. Differential diagnosis was led by a common bile duct stone, but ultrasound is negative. She does have liver inflammation with a high bilirubin and ALT. She will require a GI consultation as an outpatient. CT imaging was negative for bowel obstruction, mass, or other intraabdominal process that could explain her symptoms.    Diagnosis:    ICD-10-CM    1. Abdominal pain, generalized R10.84    2. Elevated liver enzymes R74.8    3. Other ascites R18.8      Disposition:   The patient is discharged to home.    Discharge Medications:  No discharge medications.    Scribe Disclosure:  JENNI, Paula Easley, am serving as a scribe at 10:13 AM on 7/23/2018 to document services personally performed by Parveen Sams MD based on my observations and the provider's statements to me.     EMERGENCY DEPARTMENT       Parveen Sams MD  07/23/18 8673

## 2018-07-23 NOTE — ED AVS SNAPSHOT
Emergency Department    [address] [facility] [address]    Phone:  [phone]    Fax:  [phone]                                       Christ Servin   MRN: [id number]    Department:   Emergency Department   Date of Visit:  7/23/2018           Patient Information     Date Of Birth          1960        Your diagnoses for this visit were:     Abdominal pain, generalized     Elevated liver enzymes     Other ascites        You were seen by Parveen Sams MD.      Follow-up Information     Follow up with Sam Jimenez MD. Call today.    Specialty:  Gastroenterology    Contact information:    [facility]  [address] [facility] [place] [facility] [address]  [phone]          Discharge Instructions         Abdominal Pain    Abdominal pain is pain in the stomach or belly area. Everyone has this pain from time to time. In many cases it goes away on its own. But abdominal pain can sometimes be due to a serious problem, such as appendicitis. So it s important to know when to seek help.  Causes of abdominal pain  There are many possible causes of abdominal pain. Common causes in adults include:    Constipation, diarrhea, or gas    Stomach acid flowing back up into the esophagus (acid reflux or heartburn)    Severe acid reflux, called GERD (gastroesophageal reflux disease)    A sore in the lining of the stomach or small intestine (peptic ulcer)    Inflammation of the gallbladder, liver, or pancreas    Gallstones or kidney stones    Appendicitis     Intestinal blockage     An internal organ pushing through a muscle or other tissue (hernia)    Urinary tract infections    In women, menstrual cramps, fibroids, or endometriosis    Inflammation or infection of the intestines  Diagnosing the cause of abdominal pain  Your healthcare provider will do a physical exam help find the cause of your pain. If needed, tests will be ordered. Belly pain has many possible causes. So it can be hard  to find the reason for your pain. Giving details about your pain can help. Tell your provider where and when you feel the pain, and what makes it better or worse. Also let your provider know if you have other symptoms such as:    Fever    Tiredness    Upset stomach (nausea)    Vomiting    Changes in bathroom habits  Treating abdominal pain  Some causes of pain need emergency medical treatment right away. These include appendicitis or a bowel blockage. Other problems can be treated with rest, fluids, or medicines. Your healthcare provider can give you specific instructions for treatment or self-care based on what is causing your pain.  If you have vomiting or diarrhea, sip water or other clear fluids. When you are ready to eat solid foods again, start with small amounts of easy-to-digest, low-fat foods. These include apple sauce, toast, or crackers.   When to seek medical care  Call 911 or go to the hospital right away if you:    Can t pass stool and are vomiting    Are vomiting blood or have bloody diarrhea or black, tarry diarrhea    Have chest, neck, or shoulder pain    Feel like you might pass out    Have pain in your shoulder blades with nausea    Have sudden, severe belly pain    Have new, severe pain unlike any you have felt before    Have a belly that is rigid, hard, and tender to touch  Call your healthcare provider if you have:    Pain for more than 5 days    Bloating for more than 2 days    Diarrhea for more than 5 days    A fever of 100.4 F (38 C) or higher, or as directed by your healthcare provider    Pain that gets worse    Weight loss for no reason    Continued lack of appetite    Blood in your stool  How to prevent abdominal pain  Here are some tips to help prevent abdominal pain:    Eat smaller amounts of food at one time.    Avoid greasy, fried, or other high-fat foods.    Avoid foods that give you gas.    Exercise regularly.    Drink plenty of fluids.  To help prevent GERD symptoms:    Quit  smoking.    Reduce alcohol and certain foods that increase stomach acid.    Avoid aspirin and over-the-counter pain and fever medicines (NSAIDS or nonsteroidal anti-inflammatory drugs), if possible    Lose extra weight.    Finish eating at least 2 hours before you go to bed or lie down.    Raise the head of your bed.  Date Last Reviewed: 7/1/2016 2000-2017 [employer]. [address] [place] [address]. All rights reserved. This information is not intended as a substitute for professional medical care. Always follow your healthcare professional's instructions.        Your next 10 appointments already scheduled     Jul 26, 2018  8:20 AM CDT   Office Visit with Gary Bunch MD   [facility]    [address] [address] [phone]           Bring a current list of meds and any records pertaining to this visit. For Physicals, please bring immunization records and any forms needing to be filled out. Please arrive 10 minutes early to complete paperwork.              24 Hour Appointment Hotline       To make an appointment at any [facility] call [phone] ([phone]). If you don't have a family doctor or clinic, we will help you find one. [facility] are conveniently located to serve the needs of you and your family.             Review of your medicines      Our records show that you are taking the medicines listed below. If these are incorrect, please call your family doctor or clinic.        Dose / Directions Last dose taken    albuterol 108 (90 Base) MCG/ACT Inhaler   Commonly known as:  PROAIR HFA/PROVENTIL HFA/VENTOLIN HFA   Dose:  2 puff   Quantity:  3 Inhaler        Inhale 2 puffs into the lungs every 6 hours as needed for shortness of breath / dyspnea or wheezing   Refills:  1        bismuth subsalicylate 262 MG/15ML suspension   Commonly known as:  PEPTO BISMOL   Dose:  15 mL         Take 15 mLs by mouth every 6 hours as needed for indigestion   Refills:  0        methylPREDNISolone 4 MG tablet   Commonly known as:  MEDROL DOSEPAK   Quantity:  21 tablet        Follow package instructions   Refills:  0                Procedures and tests performed during your visit     CBC with platelets differential    CT Abdomen Pelvis w Contrast    Comprehensive metabolic panel    Lipase    Troponin I    US Abdomen Limited      Orders Needing Specimen Collection     None      Pending Results     Date and Time Order Name Status Description    7/23/2018 1304 CT Abdomen Pelvis w Contrast Preliminary     7/23/2018 1022 US Abdomen Limited Preliminary             Pending Culture Results     No orders found from 7/21/2018 to 7/24/2018.            Pending Results Instructions     If you had any lab results that were not finalized at the time of your Discharge, you can call the ED Lab Result RN at [phone]. You will be contacted by this team for any positive Lab results or changes in treatment. The nurses are available 7 days a week from 10A to 6:30P.  You can leave a message 24 hours per day and they will return your call.        Test Results From Your Hospital Stay        7/23/2018 10:57 AM      Component Results     Component Value Ref Range & Units Status    Sodium 142 133 - 144 mmol/L Final    Potassium 4.3 3.4 - 5.3 mmol/L Final    Specimen slightly hemolyzed, potassium may be falsely elevated    Chloride 111 (H) 94 - 109 mmol/L Final    Carbon Dioxide 21 20 - 32 mmol/L Final    Anion Gap 10 3 - 14 mmol/L Final    Glucose 145 (H) 70 - 99 mg/dL Final    Urea Nitrogen 18 7 - 30 mg/dL Final    Creatinine 1.04 0.52 - 1.04 mg/dL Final    GFR Estimate 55 (L) >60 mL/min/1.7m2 Final    Non  GFR Calc    GFR Estimate If Black 66 >60 mL/min/1.7m2 Final    African American GFR Calc    Calcium 8.3 (L) 8.5 - 10.1 mg/dL Final    Bilirubin Total 1.8 (H) 0.2 - 1.3 mg/dL Final    Albumin 3.6 3.4 - 5.0 g/dL  Final    Protein Total 7.3 6.8 - 8.8 g/dL Final    Alkaline Phosphatase 117 40 - 150 U/L Final    ALT 64 (H) 0 - 50 U/L Final    AST 41 0 - 45 U/L Final    Specimen is hemolyzed which can falsely elevate AST. Analysis of a   non-hemolyzed specimen may result in a lower value.           7/23/2018 10:34 AM      Component Results     Component Value Ref Range & Units Status    WBC 7.5 4.0 - 11.0 10e9/L Final    RBC Count 4.87 3.8 - 5.2 10e12/L Final    Hemoglobin 14.8 11.7 - 15.7 g/dL Final    Hematocrit 45.5 35.0 - 47.0 % Final    MCV 93 78 - 100 fl Final    MCH 30.4 26.5 - 33.0 pg Final    MCHC 32.5 31.5 - 36.5 g/dL Final    RDW 14.3 10.0 - 15.0 % Final    Platelet Count 181 150 - 450 10e9/L Final    Diff Method Automated Method  Final    % Neutrophils 67.6 % Final    % Lymphocytes 25.9 % Final    % Monocytes 5.1 % Final    % Eosinophils 0.7 % Final    % Basophils 0.4 % Final    % Immature Granulocytes 0.3 % Final    Nucleated RBCs 0 0 /100 Final    Absolute Neutrophil 5.1 1.6 - 8.3 10e9/L Final    Absolute Lymphocytes 2.0 0.8 - 5.3 10e9/L Final    Absolute Monocytes 0.4 0.0 - 1.3 10e9/L Final    Absolute Eosinophils 0.1 0.0 - 0.7 10e9/L Final    Absolute Basophils 0.0 0.0 - 0.2 10e9/L Final    Abs Immature Granulocytes 0.0 0 - 0.4 10e9/L Final    Absolute Nucleated RBC 0.0  Final         7/23/2018 10:57 AM      Component Results     Component Value Ref Range & Units Status    Lipase 142 73 - 393 U/L Final         7/23/2018 11:24 AM      Narrative     ULTRASOUND ABDOMEN LIMITED   7/23/2018 11:17 AM     HISTORY: Right upper quadrant pain.    COMPARISON: None.  .  FINDINGS: The liver is unremarkable. No evidence for fatty  infiltration. No focal hepatic lesions. The gallbladder is not seen,  consistent with history of prior cholecystectomy. No intra- or  extrahepatic bile duct dilatation. The common duct could not be  visualized in its entirety. Limited evaluation of the right kidney is  unremarkable. The abdominal  aorta is obscured by overlying bowel gas  and could not be evaluated. Incidentally noted right pleural effusion.        Impression     IMPRESSION:   1. Prior cholecystectomy.  2. Nonvisualization of the pancreas.  3. Incidentally noted right pleural effusion.         7/23/2018 10:57 AM      Component Results     Component Value Ref Range & Units Status    Troponin I ES <0.015 0.000 - 0.045 ug/L Final    The 99th percentile for upper reference range is 0.045 ug/L.  Troponin values   in the range of 0.045 - 0.120 ug/L may be associated with risks of adverse   clinical events.           7/23/2018  2:19 PM      Narrative     CT ABDOMEN AND PELVIS WITH CONTRAST   7/23/2018 1:55 PM     HISTORY: Right lower quadrant pain.    TECHNIQUE: 84mL Isovue-370 IV were administered. After contrast  administration, volumetric helical sections were acquired from the  lung bases to the ischial tuberosities. Coronal images were also  reconstructed. Radiation dose for this scan was reduced using  automated exposure control, adjustment of the mA and/or kV according  to patient size, or iterative reconstruction technique.    COMPARISON: Right upper quadrant ultrasound performed earlier today.     FINDINGS: No bowel obstruction. Unremarkable appendix.  No convincing  evidence for colitis or diverticulitis. Scattered colonic  diverticulosis is noted. There is a small amount of ascites noted. The  gallbladder is not seen, consistent with history of prior  cholecystectomy. The liver, spleen, adrenal glands, pancreas, and  kidneys are unremarkable. No hydronephrosis. Mild atherosclerotic  aortoiliac calcification. No adnexal masses are identified. Small  hiatal hernia. There are bilateral pleural effusions, moderate on the  right and small on the left, with mild associated compressive  atelectasis at both lung bases.          Impression     IMPRESSION:   1. Mild ascites.  2. Colonic diverticulosis, without evidence for diverticulitis.  3.  Bilateral pleural effusions, moderate on the right and small on the  left.                Clinical Quality Measure: Blood Pressure Screening     Your blood pressure was checked while you were in the emergency department today. The last reading we obtained was  BP: 132/75 . Please read the guidelines below about what these numbers mean and what you should do about them.  If your systolic blood pressure (the top number) is less than 120 and your diastolic blood pressure (the bottom number) is less than 80, then your blood pressure is normal. There is nothing more that you need to do about it.  If your systolic blood pressure (the top number) is 120-139 or your diastolic blood pressure (the bottom number) is 80-89, your blood pressure may be higher than it should be. You should have your blood pressure rechecked within a year by a primary care provider.  If your systolic blood pressure (the top number) is 140 or greater or your diastolic blood pressure (the bottom number) is 90 or greater, you may have high blood pressure. High blood pressure is treatable, but if left untreated over time it can put you at risk for heart attack, stroke, or kidney failure. You should have your blood pressure rechecked by a primary care provider within the next 4 weeks.  If your provider in the emergency department today gave you specific instructions to follow-up with your doctor or provider even sooner than that, you should follow that instruction and not wait for up to 4 weeks for your follow-up visit.        Thank you for choosing [facility]       Thank you for choosing [facility] for your care. Our goal is always to provide you with excellent care. Hearing back from our patients is one way we can continue to improve our services. Please take a few minutes to complete the written survey that you may receive in the mail after you visit with us. Thank you!        Care EveryWhere ID     This is your Care EveryWhere ID. This could be used by  other organizations to access your [facility] medical records  [id number]        Equal Access to Services     CECILE PARSON : Donna Carlos, ervin ellis, geena mai. So [facility] [phone].    ATENCIÓN: Si habla español, tiene a luis disposición servicios gratuitos de asistencia lingüística. Llame al [phone].    We comply with applicable federal civil rights laws and [place] laws. We do not discriminate on the basis of race, color, national origin, age, disability, sex, sexual orientation, or gender identity.            After Visit Summary       This is your record. Keep this with you and show to your community pharmacist(s) and doctor(s) at your next visit.

## 2018-07-23 NOTE — DISCHARGE INSTRUCTIONS

## 2018-07-26 ENCOUNTER — APPOINTMENT (OUTPATIENT)
Dept: GENERAL RADIOLOGY | Facility: CLINIC | Age: 58
End: 2018-07-26
Attending: EMERGENCY MEDICINE
Payer: MEDICAID

## 2018-07-26 ENCOUNTER — HOSPITAL ENCOUNTER (INPATIENT)
Facility: CLINIC | Age: 58
LOS: 8 days | Discharge: HOME | End: 2018-08-03
Attending: EMERGENCY MEDICINE | Admitting: HOSPITALIST
Payer: MEDICAID

## 2018-07-26 ENCOUNTER — APPOINTMENT (OUTPATIENT)
Dept: CARDIOLOGY | Facility: CLINIC | Age: 58
End: 2018-07-26
Attending: INTERNAL MEDICINE
Payer: MEDICAID

## 2018-07-26 ENCOUNTER — APPOINTMENT (OUTPATIENT)
Dept: ULTRASOUND IMAGING | Facility: CLINIC | Age: 58
End: 2018-07-26
Attending: HOSPITALIST
Payer: MEDICAID

## 2018-07-26 DIAGNOSIS — I48.91 ATRIAL FIBRILLATION WITH RVR (H): Primary | ICD-10-CM

## 2018-07-26 DIAGNOSIS — E05.90 HYPERTHYROIDISM: ICD-10-CM

## 2018-07-26 DIAGNOSIS — I50.9 CHF (CONGESTIVE HEART FAILURE) (H): ICD-10-CM

## 2018-07-26 DIAGNOSIS — R10.13 EPIGASTRIC PAIN: ICD-10-CM

## 2018-07-26 DIAGNOSIS — R06.02 SHORTNESS OF BREATH: ICD-10-CM

## 2018-07-26 LAB
ALBUMIN SERPL-MCNC: 3.6 G/DL (ref 3.4–5)
ALP SERPL-CCNC: 101 U/L (ref 40–150)
ALT SERPL W P-5'-P-CCNC: 51 U/L (ref 0–50)
ANION GAP SERPL CALCULATED.3IONS-SCNC: 11 MMOL/L (ref 3–14)
AST SERPL W P-5'-P-CCNC: 41 U/L (ref 0–45)
BASOPHILS # BLD AUTO: 0 10E9/L (ref 0–0.2)
BASOPHILS NFR BLD AUTO: 0.2 %
BILIRUB SERPL-MCNC: 1.5 MG/DL (ref 0.2–1.3)
BUN SERPL-MCNC: 16 MG/DL (ref 7–30)
CALCIUM SERPL-MCNC: 8.6 MG/DL (ref 8.5–10.1)
CHLORIDE SERPL-SCNC: 110 MMOL/L (ref 94–109)
CO2 SERPL-SCNC: 22 MMOL/L (ref 20–32)
CREAT SERPL-MCNC: 1.2 MG/DL (ref 0.52–1.04)
DIFFERENTIAL METHOD BLD: NORMAL
EOSINOPHIL # BLD AUTO: 0 10E9/L (ref 0–0.7)
EOSINOPHIL NFR BLD AUTO: 0.3 %
ERYTHROCYTE [DISTWIDTH] IN BLOOD BY AUTOMATED COUNT: 14.5 % (ref 10–15)
GFR SERPL CREATININE-BSD FRML MDRD: 46 ML/MIN/1.7M2
GLUCOSE SERPL-MCNC: 116 MG/DL (ref 70–99)
HCT VFR BLD AUTO: 46.2 % (ref 35–47)
HGB BLD-MCNC: 14.8 G/DL (ref 11.7–15.7)
IMM GRANULOCYTES # BLD: 0 10E9/L (ref 0–0.4)
IMM GRANULOCYTES NFR BLD: 0.1 %
INTERPRETATION ECG - MUSE: NORMAL
LACTATE BLD-SCNC: 1.5 MMOL/L (ref 0.7–2)
LIPASE SERPL-CCNC: 139 U/L (ref 73–393)
LMWH PPP CHRO-ACNC: 0.4 IU/ML
LYMPHOCYTES # BLD AUTO: 2.6 10E9/L (ref 0.8–5.3)
LYMPHOCYTES NFR BLD AUTO: 29.8 %
MAGNESIUM SERPL-MCNC: 1.8 MG/DL (ref 1.6–2.3)
MCH RBC QN AUTO: 30.1 PG (ref 26.5–33)
MCHC RBC AUTO-ENTMCNC: 32 G/DL (ref 31.5–36.5)
MCV RBC AUTO: 94 FL (ref 78–100)
MONOCYTES # BLD AUTO: 0.6 10E9/L (ref 0–1.3)
MONOCYTES NFR BLD AUTO: 6.6 %
NEUTROPHILS # BLD AUTO: 5.4 10E9/L (ref 1.6–8.3)
NEUTROPHILS NFR BLD AUTO: 63 %
NRBC # BLD AUTO: 0 10*3/UL
NRBC BLD AUTO-RTO: 0 /100
NT-PROBNP SERPL-MCNC: 7626 PG/ML (ref 0–900)
PLATELET # BLD AUTO: 171 10E9/L (ref 150–450)
POTASSIUM SERPL-SCNC: 4.7 MMOL/L (ref 3.4–5.3)
PROT SERPL-MCNC: 6.7 G/DL (ref 6.8–8.8)
RBC # BLD AUTO: 4.91 10E12/L (ref 3.8–5.2)
SODIUM SERPL-SCNC: 143 MMOL/L (ref 133–144)
T4 FREE SERPL-MCNC: 1.65 NG/DL (ref 0.76–1.46)
TROPONIN I SERPL-MCNC: <0.015 UG/L (ref 0–0.04)
TSH SERPL DL<=0.005 MIU/L-ACNC: 4.78 MU/L (ref 0.4–4)
WBC # BLD AUTO: 8.6 10E9/L (ref 4–11)

## 2018-07-26 PROCEDURE — 83880 ASSAY OF NATRIURETIC PEPTIDE: CPT | Performed by: EMERGENCY MEDICINE

## 2018-07-26 PROCEDURE — 25000132 ZZH RX MED GY IP 250 OP 250 PS 637: Performed by: INTERNAL MEDICINE

## 2018-07-26 PROCEDURE — 83735 ASSAY OF MAGNESIUM: CPT | Performed by: EMERGENCY MEDICINE

## 2018-07-26 PROCEDURE — 96376 TX/PRO/DX INJ SAME DRUG ADON: CPT

## 2018-07-26 PROCEDURE — 83605 ASSAY OF LACTIC ACID: CPT | Performed by: HOSPITALIST

## 2018-07-26 PROCEDURE — 40000141 ZZH STATISTIC PERIPHERAL IV START W/O US GUIDANCE

## 2018-07-26 PROCEDURE — 93005 ELECTROCARDIOGRAM TRACING: CPT

## 2018-07-26 PROCEDURE — 96366 THER/PROPH/DIAG IV INF ADDON: CPT

## 2018-07-26 PROCEDURE — 93010 ELECTROCARDIOGRAM REPORT: CPT | Performed by: INTERNAL MEDICINE

## 2018-07-26 PROCEDURE — 25000125 ZZHC RX 250: Performed by: HOSPITALIST

## 2018-07-26 PROCEDURE — 25000125 ZZHC RX 250: Performed by: INTERNAL MEDICINE

## 2018-07-26 PROCEDURE — 99221 1ST HOSP IP/OBS SF/LOW 40: CPT | Mod: 25 | Performed by: INTERNAL MEDICINE

## 2018-07-26 PROCEDURE — 83690 ASSAY OF LIPASE: CPT | Performed by: EMERGENCY MEDICINE

## 2018-07-26 PROCEDURE — 25000125 ZZHC RX 250: Performed by: EMERGENCY MEDICINE

## 2018-07-26 PROCEDURE — 96365 THER/PROPH/DIAG IV INF INIT: CPT

## 2018-07-26 PROCEDURE — 84484 ASSAY OF TROPONIN QUANT: CPT | Performed by: HOSPITALIST

## 2018-07-26 PROCEDURE — 21000001 ZZH R&B HEART CARE

## 2018-07-26 PROCEDURE — 93306 TTE W/DOPPLER COMPLETE: CPT | Mod: 26 | Performed by: INTERNAL MEDICINE

## 2018-07-26 PROCEDURE — 36415 COLL VENOUS BLD VENIPUNCTURE: CPT | Performed by: HOSPITALIST

## 2018-07-26 PROCEDURE — 84443 ASSAY THYROID STIM HORMONE: CPT | Performed by: EMERGENCY MEDICINE

## 2018-07-26 PROCEDURE — 25000128 H RX IP 250 OP 636: Performed by: INTERNAL MEDICINE

## 2018-07-26 PROCEDURE — 93306 TTE W/DOPPLER COMPLETE: CPT

## 2018-07-26 PROCEDURE — 25000128 H RX IP 250 OP 636: Performed by: HOSPITALIST

## 2018-07-26 PROCEDURE — 71046 X-RAY EXAM CHEST 2 VIEWS: CPT

## 2018-07-26 PROCEDURE — 85025 COMPLETE CBC W/AUTO DIFF WBC: CPT | Performed by: EMERGENCY MEDICINE

## 2018-07-26 PROCEDURE — 85520 HEPARIN ASSAY: CPT | Performed by: HOSPITALIST

## 2018-07-26 PROCEDURE — 76536 US EXAM OF HEAD AND NECK: CPT

## 2018-07-26 PROCEDURE — 99285 EMERGENCY DEPT VISIT HI MDM: CPT | Mod: 25

## 2018-07-26 PROCEDURE — 84484 ASSAY OF TROPONIN QUANT: CPT | Performed by: EMERGENCY MEDICINE

## 2018-07-26 PROCEDURE — 84439 ASSAY OF FREE THYROXINE: CPT | Performed by: EMERGENCY MEDICINE

## 2018-07-26 PROCEDURE — 80053 COMPREHEN METABOLIC PANEL: CPT | Performed by: EMERGENCY MEDICINE

## 2018-07-26 PROCEDURE — 25000128 H RX IP 250 OP 636: Performed by: EMERGENCY MEDICINE

## 2018-07-26 PROCEDURE — 99223 1ST HOSP IP/OBS HIGH 75: CPT | Mod: AI | Performed by: HOSPITALIST

## 2018-07-26 PROCEDURE — 25000132 ZZH RX MED GY IP 250 OP 250 PS 637: Performed by: HOSPITALIST

## 2018-07-26 RX ORDER — POTASSIUM CHLORIDE 29.8 MG/ML
20 INJECTION INTRAVENOUS
Status: DISCONTINUED | OUTPATIENT
Start: 2018-07-26 | End: 2018-07-28

## 2018-07-26 RX ORDER — LABETALOL HYDROCHLORIDE 5 MG/ML
10 INJECTION, SOLUTION INTRAVENOUS
Status: DISCONTINUED | OUTPATIENT
Start: 2018-07-26 | End: 2018-07-26

## 2018-07-26 RX ORDER — METOPROLOL SUCCINATE 25 MG/1
25 TABLET, EXTENDED RELEASE ORAL DAILY
Status: DISCONTINUED | OUTPATIENT
Start: 2018-07-26 | End: 2018-07-27

## 2018-07-26 RX ORDER — AMOXICILLIN 250 MG
1 CAPSULE ORAL 2 TIMES DAILY PRN
Status: DISCONTINUED | OUTPATIENT
Start: 2018-07-26 | End: 2018-08-03 | Stop reason: HOSPADM

## 2018-07-26 RX ORDER — BISACODYL 10 MG
10 SUPPOSITORY, RECTAL RECTAL DAILY PRN
Status: DISCONTINUED | OUTPATIENT
Start: 2018-07-26 | End: 2018-08-03 | Stop reason: HOSPADM

## 2018-07-26 RX ORDER — MAGNESIUM SULFATE HEPTAHYDRATE 40 MG/ML
4 INJECTION, SOLUTION INTRAVENOUS EVERY 4 HOURS PRN
Status: DISCONTINUED | OUTPATIENT
Start: 2018-07-26 | End: 2018-07-26

## 2018-07-26 RX ORDER — METOPROLOL TARTRATE 1 MG/ML
2.5-5 INJECTION, SOLUTION INTRAVENOUS EVERY 4 HOURS PRN
Status: DISCONTINUED | OUTPATIENT
Start: 2018-07-26 | End: 2018-08-03 | Stop reason: HOSPADM

## 2018-07-26 RX ORDER — POTASSIUM CHLORIDE 1500 MG/1
20-40 TABLET, EXTENDED RELEASE ORAL
Status: DISCONTINUED | OUTPATIENT
Start: 2018-07-26 | End: 2018-07-28

## 2018-07-26 RX ORDER — ACETAMINOPHEN 650 MG/1
650 SUPPOSITORY RECTAL EVERY 4 HOURS PRN
Status: DISCONTINUED | OUTPATIENT
Start: 2018-07-26 | End: 2018-08-03 | Stop reason: HOSPADM

## 2018-07-26 RX ORDER — SODIUM CHLORIDE 9 MG/ML
INJECTION, SOLUTION INTRAVENOUS ONCE
Status: COMPLETED | OUTPATIENT
Start: 2018-07-26 | End: 2018-07-26

## 2018-07-26 RX ORDER — ALBUTEROL SULFATE 90 UG/1
2 AEROSOL, METERED RESPIRATORY (INHALATION) EVERY 6 HOURS PRN
Status: DISCONTINUED | OUTPATIENT
Start: 2018-07-26 | End: 2018-08-03 | Stop reason: HOSPADM

## 2018-07-26 RX ORDER — AMOXICILLIN 250 MG
2 CAPSULE ORAL 2 TIMES DAILY PRN
Status: DISCONTINUED | OUTPATIENT
Start: 2018-07-26 | End: 2018-08-03 | Stop reason: HOSPADM

## 2018-07-26 RX ORDER — POTASSIUM CHLORIDE 7.45 MG/ML
10 INJECTION INTRAVENOUS
Status: DISCONTINUED | OUTPATIENT
Start: 2018-07-26 | End: 2018-07-28

## 2018-07-26 RX ORDER — ONDANSETRON 2 MG/ML
4 INJECTION INTRAMUSCULAR; INTRAVENOUS EVERY 6 HOURS PRN
Status: DISCONTINUED | OUTPATIENT
Start: 2018-07-26 | End: 2018-08-03 | Stop reason: HOSPADM

## 2018-07-26 RX ORDER — ACETAMINOPHEN 325 MG/1
650 TABLET ORAL EVERY 4 HOURS PRN
Status: DISCONTINUED | OUTPATIENT
Start: 2018-07-26 | End: 2018-08-03 | Stop reason: HOSPADM

## 2018-07-26 RX ORDER — POTASSIUM CL/LIDO/0.9 % NACL 10MEQ/0.1L
10 INTRAVENOUS SOLUTION, PIGGYBACK (ML) INTRAVENOUS
Status: DISCONTINUED | OUTPATIENT
Start: 2018-07-26 | End: 2018-07-28

## 2018-07-26 RX ORDER — ONDANSETRON 4 MG/1
4 TABLET, ORALLY DISINTEGRATING ORAL EVERY 6 HOURS PRN
Status: DISCONTINUED | OUTPATIENT
Start: 2018-07-26 | End: 2018-08-03 | Stop reason: HOSPADM

## 2018-07-26 RX ORDER — POTASSIUM CHLORIDE 1.5 G/1.58G
20-40 POWDER, FOR SOLUTION ORAL
Status: DISCONTINUED | OUTPATIENT
Start: 2018-07-26 | End: 2018-07-28

## 2018-07-26 RX ORDER — NALOXONE HYDROCHLORIDE 0.4 MG/ML
.1-.4 INJECTION, SOLUTION INTRAMUSCULAR; INTRAVENOUS; SUBCUTANEOUS
Status: DISCONTINUED | OUTPATIENT
Start: 2018-07-26 | End: 2018-08-03 | Stop reason: HOSPADM

## 2018-07-26 RX ORDER — POLYETHYLENE GLYCOL 3350 17 G/17G
17 POWDER, FOR SOLUTION ORAL DAILY PRN
Status: DISCONTINUED | OUTPATIENT
Start: 2018-07-26 | End: 2018-08-03 | Stop reason: HOSPADM

## 2018-07-26 RX ORDER — FUROSEMIDE 10 MG/ML
80 INJECTION INTRAMUSCULAR; INTRAVENOUS ONCE
Status: COMPLETED | OUTPATIENT
Start: 2018-07-26 | End: 2018-07-26

## 2018-07-26 RX ORDER — DILTIAZEM HYDROCHLORIDE 5 MG/ML
10 INJECTION INTRAVENOUS ONCE
Status: COMPLETED | OUTPATIENT
Start: 2018-07-26 | End: 2018-07-26

## 2018-07-26 RX ADMIN — METOPROLOL SUCCINATE 25 MG: 25 TABLET, EXTENDED RELEASE ORAL at 16:34

## 2018-07-26 RX ADMIN — SODIUM CHLORIDE: 9 INJECTION, SOLUTION INTRAVENOUS at 07:07

## 2018-07-26 RX ADMIN — DILTIAZEM HYDROCHLORIDE 5 MG/HR: 5 INJECTION INTRAVENOUS at 07:01

## 2018-07-26 RX ADMIN — DILTIAZEM HYDROCHLORIDE 10 MG: 5 INJECTION INTRAVENOUS at 06:48

## 2018-07-26 RX ADMIN — HEPARIN SODIUM 800 UNITS/HR: 10000 INJECTION, SOLUTION INTRAVENOUS at 11:30

## 2018-07-26 RX ADMIN — RANITIDINE 150 MG: 150 TABLET ORAL at 11:25

## 2018-07-26 RX ADMIN — FUROSEMIDE 80 MG: 10 INJECTION, SOLUTION INTRAMUSCULAR; INTRAVENOUS at 16:56

## 2018-07-26 RX ADMIN — FUROSEMIDE 8 MG/HR: 10 INJECTION, SOLUTION INTRAVENOUS at 17:07

## 2018-07-26 RX ADMIN — RANITIDINE 150 MG: 150 TABLET ORAL at 20:42

## 2018-07-26 RX ADMIN — DILTIAZEM HYDROCHLORIDE 10 MG/HR: 5 INJECTION INTRAVENOUS at 23:42

## 2018-07-26 ASSESSMENT — ENCOUNTER SYMPTOMS
VOMITING: 1
ABDOMINAL PAIN: 1
FEVER: 0
SHORTNESS OF BREATH: 1

## 2018-07-26 ASSESSMENT — ACTIVITIES OF DAILY LIVING (ADL)
ADLS_ACUITY_SCORE: 13

## 2018-07-26 NOTE — CONSULTS
INPATIENT CARDIOLOGY CONSULTATION   [facility].   DATE OF ADMISSION:  07/26/2018.   DATE OF CONSULT:  07/26/2018.      REFERRAL SOURCE:  Philippe Ureña DO Hospitalist Service.      REASON FOR REFERRAL:     1.  Congestive heart failure.   2.  Atrial fibrillation with rapid ventricular rates.      HISTORY OF PRESENT ILLNESS:  The patient is a 58-year-old unemployed lady originally from the [facility] who has been living in the [place] for the last 8 years.  Her male partner was present in the room.  She has not sought previous medical care and therefore is not known to have any chronic medical diagnoses. Never tobacco smoker.  She is short statured and centrally obese with a BMI of 30.3 kg/m2.      The patient presents with a 4-6 week history of progressive exertional dyspnea, culminating in NYHA class IV not improved with inhaled bronchodilators, orthopnea, PND, chest tightness, palpitations and intermittent dizziness, nausea and vomiting.  She also states that both her legs and abdomen have been swelling up.  She is not chronically on a diuretic.  Her diet is high in sodium.  She does not drink alcohol.  In the Emergency Room, she was found to be in atrial fibrillation with rapid ventricular rates and hypertensive at 144/100 mmHg.  Her heart rates have been as high as 170-180 BPM, but she has responded favorably to IV diltiazem and they are now in the 90s.  Visibly, she has conversational dyspnea and is not able to lie flat even briefly for examination.      Her chest x-ray shows pulmonary edema and bilateral pleural effusions.  NT-proBNP elevated at 7600, serial troponin I negative.  Thyroid labs are abnormal with a normal TSH of 4.78, but an elevated free T4 of 1.65 (reference range 0.7-1.4).  She is not on any thyroxine replacement therapy.      Renal panel suggests stage III CKD with a sodium of 143, potassium 4.7, BUN 16, creatinine 1.2 with an estimated GFR of 46.      She has  not undergone any prior cardiac imaging.      MEDICATIONS PRIOR TO ADMISSION:     1. Albuterol inhaler.   2. Bismuth.    3. Ondansetron.   4. Ranitidine.        ALLERGIES:  NO KNOWN ALLERGIES.      PAST MEDICAL HISTORY:  None relevant as she does not seek medical care.      PAST SURGICAL HISTORY:  Status post cholecystectomy.      FAMILY HISTORY:  Negative for coronary disease, but her brother  of a stroke in his 60s.      PHYSICAL EXAMINATION:   VITAL SIGNS:  Temperature 98.2 Fahrenheit, blood pressure 133/100, pulse  BPM, irregularly irregular (on IV diltiazem drip).  Respiratory rate 22 per minute, sats 93% on room air, weight 80 kg (176 pounds), height 1.6 meters (5 feet 4 inches), BMI 30.3 kg/m2.   CONSTITUTIONAL:  Short statured centrally obese, supple, soft tissue around her neck.  She has conversational dyspnea, but is alert, oriented x 3.   EYES:  No pallor.   ENT:  No cyanosis.   NECK:  Hard to assess for thyromegaly or JVP due to short, thick neck.   RESPIRATORY:  She has bibasilar bilateral extensive rales up to her mid lungs and bilateral small to moderate size pleural effusion on palpation.   CARDIOVASCULAR:  Apical impulse not palpable due to obesity.  JVP not assessable due to short, thick neck.  Heart sounds are irregularly irregular, rapid and no audible murmur in the context of very rapid heart rate.     GI:  Obese.  No abdominal wall tenderness, but is generally bloated, nontender, active bowel sounds.   SKIN:  Normal.   MUSCULOSKELETAL:  Normal.   EXTREMITIES:  She has 3+ pitting edema up to her mid thighs.      DIAGNOSES:   1. Acute decompensated congestive heart failure, NYHA class IV, hyupervolemia.     2. A new diagnosis of atrial fibrillation with rapid ventricular rates.   3. A new diagnosis of hypertension.     4. Abnormal thyroid labs, etiology unclear.   5.  Obesity.   6.  High clinical suspicion of undiagnosed sleep disordered breathing.      ASSESSMENT:    The patient is in  acute decompensated heart failure.  Transthoracic echocardiogram has been ordered and will help us assess whether she has systolic versus diastolic or combined heart failure.  She is fairly tachycardic, and I think the primary  of this is the very obvious fluid overload.  Our treatment will consist of a combination of IV diuretic therapy and rate control.  Prior to discharge, she will require an assessment of her coronary status.      PLAN:   1.  80 mg IV Lasix followed by Lasix infusion.   2.  Add Toprol-XL 25 mg.   3.  Up titrate IV diltiazem to achieve rate control.   4.  Defer adding ACE inhibitor until we have optimized her fluid status.   5.  2 gram sodium restriction, 1800 mL fluid restriction.      Thank you for consulting us.  Cardiology will follow.  Please transfer the patient to the Cardiac Special Care Unit.  Relevant orders have been placed.         MOSES REMY MD             D: 2018   T: 2018   MT: MAGDIEL      Name:     MARÍA MAURICIO   MRN:      [id number]        Account:       [id number]   :      1960           Consult Date:  2018      Document: [id number]       cc: Philippe Ureña DO

## 2018-07-26 NOTE — ED PROVIDER NOTES
History     Chief Complaint:  Abdominal Pain     HPI   Christ Servin is a 58 year old female who presents with abdominal pain and chest discomfort and shortness of breath.  According to the patient, she started feeling sick about a month ago. She has upper abdominal pain with intermittent persistent vomiting. The patient also noticed that her stomach feels bloated.  Her symptoms worsen when she lays down. The patient also noted that she has chest discomfort and shortness of breath as well as swelling in both of her legs. She denies chronic medical problems, but notes that she has not been able to eat for the past four days. The patient was in the Emergency Department on 07/23/2018 and was recommended GI follow-up secondary to mildly elevated LFTs.     Cardiac/PE/DVT Risk Factors:  History of hypertension - No  History of hyperlipidemia - No  History of diabetes - No  History of smoking - No  Personal history of PE/DVT - No  Family history of PE/DVT - No  Family history of heart complications - No  Recent travel - No  Recent surgery - No  Other immobilizations - No  Cancer - No    Allergies:  No Known Drug Allergies    Medications:    Albuterol Inhaler   Pepto Bismol   Medrol Dosepak  Zofran   Zantac     Past Medical History:    The patient denies any relevant past medical history.    Past Surgical History:    Cholecystectomy   Ovarian Cysts     Family History:    The patient denies any relevant family medical history.    Social History:  The patient was accompanied to the ED by significant other.  Smoking Status: No  Smokeless Tobacco: No  Alcohol Use: No  Marital Status: Single    Review of Systems   Constitutional: Negative for fever.   Respiratory: Positive for shortness of breath.    Cardiovascular: Positive for chest pain.   Gastrointestinal: Positive for abdominal pain and vomiting.   All other systems reviewed and are negative.    Physical Exam   Vitals:  Patient Vitals for the past 24 hrs:   BP Temp Temp  "src Pulse Heart Rate Resp SpO2 Height Weight   07/26/18 0730 (!) 120/94 - - - - - - - -   07/26/18 0710 (!) 118/99 - - - 105 19 92 % - -   07/26/18 0705 - - - - 110 20 92 % - -   07/26/18 0700 118/87 - - - 130 13 93 % - -   07/26/18 0659 118/87 - - - 115 13 93 % - -   07/26/18 0658 - - - - 104 25 92 % - -   07/26/18 0654 (!) 112/97 - - - 121 17 93 % - -   07/26/18 0653 - - - - 115 (!) 31 92 % - -   07/26/18 0651 (!) 114/91 - - - 184 14 93 % - -   07/26/18 0645 - - - - 200 14 94 % - -   07/26/18 0644 - - - - 172 15 94 % - -   07/26/18 0641 - - - - 172 25 94 % - -   07/26/18 0637 (!) 142/98 - - - - - - - -   07/26/18 0331 125/82 98.1  F (36.7  C) Oral 82 - - 97 % 1.626 m (5' 4\") 80 kg (176 lb 5.9 oz)     Physical Exam  General: Alert and cooperative with exam. Patient in mild to moderate distress. Normal mentation.  Head:  Scalp is NC/AT  Eyes:  No scleral icterus, PERRL  ENT:  The external nose and ears are normal. The oropharynx is normal and without erythema; mucus membranes are moist. Uvula midline, no evidence of deep space infection.  Neck:  Normal range of motion without rigidity.  CV:  Tachycardic rate with irregular rhythm  Resp:  Breath sounds are diminished in the bases otherwise clear bilaterally    Non-labored, no retractions or accessory muscle use  GI:  Abdomen is soft, no distension, mild to moderate epigastric tenderness. No peritoneal signs  MS:  No lower extremity edema   Skin:  Warm and dry, No rash or lesions noted. No skin change   Neuro: Oriented x 3. No gross motor deficits.    Emergency Department Course     ECG:  ECG taken at 0634, ECG read at 0635  Atrial fibrillation with rapid ventricular response   Nonspecific T wave abnormality   Abnormal ECG  Rate 186 bpm. IN interval *. QRS duration 68. QT/QTc 234/411. P-R-T axes * 59 -79.    Imaging:  Radiology findings were communicated with the patient who voiced understanding of the findings.    Chest XR, PA & LAT:   IMPRESSION: Small RIGHT and " trace LEFT pleural effusions, slightly  increased on the RIGHT and unchanged on the LEFT since 7/1/2018. There  is associated atelectasis. No pneumothorax seen on either side.  Per Radiologist.     Laboratory:  Laboratory findings were communicated with the patient who voiced understanding of the findings.  CBC: AWNL. (WBC 8.6, HGB 14.8, )   CMP: Creatinine: 1.20 (H) Chloride: 110 (H), Glucose: 116 (H), GFR Estimate: 46 (L), GFR Estimate If Black: 56 (L), Bilirubin Total: 1.5 (H), Protein Total: 6.7 (L), ALT: 51 (H) o/w WNL  Lipase: 139   Nt probnp inpatient: 7626 (H)  0538 Troponin I: <0.015  Magnesium: 1.8   TSH with free T4 reflex: 4.78 (H)   T4 free: 1.65 (H)    Interventions:  0648 Cardizem, 10 mg, IV   0701 Cardizem, 5 mg/hr, IV   0707 Sodium Chloride, 0.9 %, IV      Emergency Department Course:  Nursing notes and vitals reviewed.  2389 I had my initial encounter with the patient.  I performed an exam of the patient as documented above.   IV was inserted and blood was drawn for laboratory testing, results above.  EKG obtained in the ED, see results above.   The patient was sent for a XR while in the emergency department, results above.     0644 I discussed the treatment plan with the patient. They expressed understanding of this plan and consented to admission. I discussed the patient with Dr. Ureña, who will admit the patient to a monitored bed for further evaluation and treatment.    Impression & Plan      Medical Decision Making:  Christ Servin is a 58 year old female who presents for evaluation of epigastric pain and shortness of breath.  Patient's workup is consistent with atrial fibrillation with rapid ventricular response.  Based on chronicity of symptoms and unclear nature of onset, elected to control rate instead of rhythm control with diltiazem bolus and drip. This was successful in controlling rate. I think less likely this is acute coronary syndrome, thyroid issues, PE, dissection, drug  ingestion, acute electrolyte imbalance, etc.  Will admit to medicine, telemetry, for further cares .  Labs were notable for elevated BNP; likely element of heart failure secondary to A. fib with RVR.  Additionally labs are notable for evidence of hyperthyroidism.  Will defer anticoagulation to hospitalist service.  Patient noted improvement in symptoms with rate control.    Diagnosis:    ICD-10-CM    1. Atrial fibrillation with RVR (H) I48.91 T4 free   2. Shortness of breath R06.02    3. Epigastric pain R10.13      Disposition:   Admission     Scribe Disclosure:  James ARTEAGA, am serving as a scribe at 6:25 AM on 7/26/2018 to document services personally performed by Suraj Baumann DO based on my observations and the provider's statements to me.  7/26/2018    EMERGENCY DEPARTMENT       Suraj Baumann DO  07/26/18 8018

## 2018-07-26 NOTE — PLAN OF CARE
Problem: Patient Care Overview  Goal: Plan of Care/Patient Progress Review  Outcome: No Change  Pt arrived from ED just after 0900. A&O x4. VS stable on room air, -120s with diltiazem drip @ 5 mg/hr. Up with SBA. Complained of epigastric pain, received scheduled zantac - pt reports cough and nausea/vomiting and decreased appetite for last couple days. Plan for cardiology consult, discharge pending.

## 2018-07-26 NOTE — LETTER
Transition Communication Hand-off for Care Transitions to Next Level of Care Provider    Name: Christ Servin  : 1960  MRN #: [id number]  Primary Care Provider: [facility]  Primary Care MD Name: Sona Patiño NP  Primary Clinic: [address] [place] [id number]  Primary Care Clinic Name: [facility]  Reason for Hospitalization:  Shortness of breath [R06.02]  Epigastric pain [R10.13]  Atrial fibrillation with RVR (H) [I48.91]  Acute decompensated heart failure (H) [I50.9]  Admit Date/Time: 2018  5:14 AM  Discharge Date: 8/3/18  Payor Source: No coverage found.  EMA only.    Readmission Assessment Measure (TEDDY) Risk Score/category: Average         Reason for Communication Hand-off Referral: Multiple providers/specialties  Other No insurance, private pay    Discharge Plan: Pt is discharging to home with outpt follow-up       Concern for non-adherence with plan of care:   Y/N : yes  Discharge Needs Assessment:  Needs       Most Recent Value    Transportation Available family or friend will provide    # of Referrals Placed by CTS Scheduled Follow-up appointments [INR clinic referral ]          Already enrolled in Tele-monitoring program and name of program:  no  Follow-up specialty is recommended: Yes    Follow-up plan:  Future Appointments  Date Time Provider Department Center   2018 7:40 AM [facility]   2018 8:10 [facility]   2018 10:20 AM Sona Patiño PA-C [facility]   10/22/2018 3:15 PM Arlet Dixon MD [facility]       Any outstanding tests or procedures:        Referrals     Future Labs/Procedures    Follow-Up with [facility]     Follow-Up with Cardiac Advanced Practice Provider     Follow-Up with Cardiologist             Key Recommendations:   Pt was admitted for rapid afib and acute CHF exacerbation. Pt is here on a visa and only qualifies for EMA.  She was started on  Flecainide and  xarelto. She was given a 3 month supply of Xarelto from the [facility]. And received a 3 month supply of flecainide from our discharge pharmacy.  Encouraged to get supply from [employer] at a discounted rate. Gave pt a coupon from [employer].  She also  needs a T4 and TSH drawn at the 9/7 appt w/ Sona Patiño.    Sunitha Salcido RN    AVS/Discharge Summary is the source of truth; this is a helpful guide for improved communication of patient story

## 2018-07-26 NOTE — IP AVS SNAPSHOT
[facility]    [address] [place] [address]., [address]    [place] [address]    Phone:  [phone]                                       After Visit Summary   7/26/2018    Christ Servin    MRN: [id number]           After Visit Summary Signature Page     I have received my discharge instructions, and my questions have been answered. I have discussed any challenges I see with this plan with the nurse or doctor.    ..........................................................................................................................................  Patient/Patient Representative Signature      ..........................................................................................................................................  Patient Representative Print Name and Relationship to Patient    ..................................................               ................................................  Date                                            Time    ..........................................................................................................................................  Reviewed by Signature/Title    ...................................................              ..............................................  Date                                                            Time

## 2018-07-26 NOTE — PHARMACY-ADMISSION MEDICATION HISTORY
Admission medication history interview status for the 7/26/2018  admission is complete. See EPIC admission navigator for prior to admission medications     Medication history source reliability:Good    Actions taken by pharmacist (provider contacted, etc):None     Additional medication history information not noted on PTA med list :None    Medication reconciliation/reorder completed by provider prior to medication history? No    Time spent in this activity: 10 minutes    Prior to Admission medications    Medication Sig Last Dose Taking? Auth Provider   albuterol (PROAIR HFA/PROVENTIL HFA/VENTOLIN HFA) 108 (90 Base) MCG/ACT Inhaler Inhale 2 puffs into the lungs every 6 hours as needed for shortness of breath / dyspnea or wheezing  at PRN Yes Gary Bunch MD   bismuth subsalicylate (PEPTO BISMOL) 262 MG/15ML suspension Take 15 mLs by mouth every 6 hours as needed for indigestion  at PRN Yes Reported, Patient   ondansetron (ZOFRAN ODT) 4 MG ODT tab Take 1 tablet (4 mg) by mouth every 8 hours as needed for nausea  at PRN Yes Parveen Sams MD   ranitidine (ZANTAC) 150 MG tablet Take 1 tablet (150 mg) by mouth 2 times daily for 14 days 7/25/2018 at Unknown time Yes Parveen Sams MD Meghan Lee, PharmD, BCPS

## 2018-07-26 NOTE — CONSULTS
Inpatient Cardiology Consultation   [facility]  Date of Admission:7/26/2018  Date of consult: 7/26/2018      RECOMMENDATIONS:  Inpatient cardiology consultation note has been dictated. Dictation number [id number]    1.  80 mg IV Lasix, followed by Lasix infusion at 8 mg/h.  Titrate as needed.  2.  Start Toprol-XL 25 mg daily.  3.  Continue IV heparin infusion (for anticoagulation) and IV diltiazem for rate control.  4.  Sodium restriction and fluid restriction.  5.  Transfer to cardiac special care unit.  Cardiology will follow.  Details per dictated note.  6.  Keep n.p.o. from midnight, in case RANDY guided cardioversion needed.      Arlet Dixon MD [facility]  Cardiology        PAST MEDICAL HISTORY:  History reviewed. No pertinent past medical history.    PAST SURGICAL HISTORY:  Past Surgical History:   Procedure Laterality Date     CHOLECYSTECTOMY         SOCIAL HISTORY:   Christ Servin  reports that she has never smoked. She has never used smokeless tobacco. She reports that she does not drink alcohol or use illicit drugs.    FAMILY HISTORY:  No family history on file.    PHYSICAL EXAMINATION:  Temp: 98.2  F (36.8  C) Temp src: Oral BP: (!) 133/100 Pulse: 109 Heart Rate: 92 Resp: 20 SpO2: 93 % O2 Device: None (Room air)    07/21 1500 - 07/26 1459  In: -   Out: 175 [Urine:175]  Net: -175  Vitals:    07/26/18 0331   Weight: 80 kg (176 lb 5.9 oz)

## 2018-07-26 NOTE — IP AVS SNAPSHOT
MRN:[id number]                      After Visit Summary   7/26/2018    Christ Servin    MRN: [id number]           Thank you!     Thank you for choosing [facility] for your care. Our goal is always to provide you with excellent care. Hearing back from our patients is one way we can continue to improve our services. Please take a few minutes to complete the written survey that you may receive in the mail after you visit with us. Thank you!        Patient Information     Date Of Birth          1960        Designated Caregiver       Most Recent Value    Caregiver    Will someone help with your care after discharge? yes    Name of designated caregiver Bill    Phone number of caregiver [phone]    Caregiver address [address]      About your hospital stay     You were admitted on:  July 26, 2018 You last received care in the:  [facility]    You were discharged on:  August 3, 2018        Reason for your hospital stay       You were admitted for shortness of breath due to atrial fibrillation (abnormal heart rhythm) with high heart rates and congestive heart failure.                  Who to Call     For medical emergencies, please call 911.  For non-urgent questions about your medical care, please call your primary care provider or clinic, [phone]          Attending Provider     Provider Specialty    Florida Esqueda MD Emergency Medicine    Ibis, Suraj Parker, DO Emergency Medicine    Philippe Ureña, DO Internal Medicine       Primary Care Provider Office Phone # Fax #    [facility] [phone]      After Care Instructions     Activity       Your activity upon discharge: activity as tolerated            Diet       Follow this diet upon discharge:       2 Gram Sodium Diet            Monitor and record       weight every day                  Follow-up Appointments     Follow-up and recommended labs and tests         Follow up with primary care provider, [facility], in 1 month for hospital follow- up.  Follow up labs and tests:  TSH and free T4                  Your next 10 appointments already scheduled     Aug 13, 2018  7:40 AM CDT   LAB with [facility]   [facility] ([facility])    [address] [place] [id number]   [phone]           Please do not eat 10-12 hours before your appointment if you are coming in fasting for labs on lipids, cholesterol, or glucose (sugar). This does not apply to pregnant women. Water, hot tea and black coffee (with nothing added) are okay. Do not drink other fluids, diet soda or chew gum.            Aug 13, 2018  8:10 AM CDT   CORE Enrollment with Jillian Solomon PA-C   [facility] ([facility])    [address] [place] [id number]   [phone] OPT 2            Sep 07, 2018 10:20 AM CDT   Office Visit with Sona Patiño PA-C   [facility]    [address] [place] [id number] [phone]           Bring a current list of meds and any records pertaining to this visit. For Physicals, please bring immunization records and any forms needing to be filled out. Please arrive 10 minutes early to complete paperwork.            Oct 22, 2018  3:15 PM CDT   Return Visit with Arlet Dixon MD   [facility] ([facility])    [address] [place] [id number]   [phone] OPT 2              Additional Services     Follow-Up with Cardiologist           Follow-Up with CORE Clinic           Follow-Up with Cardiac Advanced Practice Provider                 Future tests that were ordered for you     Basic metabolic panel           N terminal pro BNP outpatient                 Further instructions from your  care team       A follow-up appointment was scheduled for you [see above].  It is very important to go to it--bring these papers and a form of payment with you.  At the visit, talk about your hospital stay.  Tell your doctor how you feel.  Show your new list of medications.  Your doctor will update records, make sure you are still doing OK, and decide if any tests or medication changes are needed.      Please bring $75 to the [facility] appointment, which is needed upfront to be seen.   + The remainder appointment cost will be billed to you. The cost is likely to be similar to your prior appointments there.     The expected total cost of the Heart (CORE) appointment is estimaged $294-$397.  + There is not an upfront cost to this appointment--everything will be billed to your address  + Talk about continuing Xarelto or switching to Warfarin at this appointment.     The expected cost of the Cardiology appointment is   +  a $265 facility charge  +  plus the MD charge (depending on the complexity of your case)       Level 1 $120, Level 2 $201, Level 3 $295.        Once your Flecainide gets close to running out, . you can transfer you prescription to  [facility] ~$37.40. The closest [facility] is in [place] at [phone];  [place] [phone].    General Recommendations To Control Heart Failure When You Get Home     Heart Failure Instructions for Patients and Families: Please read and check off each of these important instructions as you do them when you get home.     Weight and Symptoms    ___ Put a scale in your bathroom.    ___ Post a weight chart or calendar next to your scale.    ___ Weigh yourself everyday as soon as you get up in the morning (before breakfast).  You should only be wearing your pajamas.  Write your weight on the chart/calendar.    ___ Bring your weight chart/calendar with you to all appointments.    ___ Call your doctor or nurse practitioner if you gain 2 pounds (in 1 day) or 5 pounds in (1  week) from your goal  good  weight.  Your good weight is also called your  dry  weight.  Your doctor or nurse will tell you what your good weight should be.    ___ Call your doctor or nurse practitioner if you have shortness of breath that gets worse over time, leg swelling or fatigue.    Medications and Diet    ___ Make sure to take your medication as prescribed.    ___ Bring a current list of your medication and all of your medicine bottles with you to all appointments.    ___ Limit fluids if you still have swelling or shortness of breath, or if your doctor tells you to do so.      ___ Eat less than 2000 mg of sodium (salt) every day. Read food labels, and do not add salt to meals. Remember, if you eat less salt you retain less fluid.    ___ Follow a heart healthy diet that is low in saturated fat.         Activity and Suggested Lifestyle Changes   ___ Stay active. Talk to your doctor about an exercise program that is safe for your heart.    ___ Stop smoking. Reduce alcohol use.      ___ Lose weight if you are overweight. Extra weight puts a lot of stress on the heart.    Control for Leg Swelling  ___ Keep your legs elevated (raised) as needed for swelling. If swelling is uncomfortable or elevation doesn t help, ask your doctor about using ACE wraps or support stockings.      What is the C.O.R.E. Clinic?     Cardiomyopathy  Optimization  Rehabilitation  Education    The C.O.R.E. Clinic is a heart failure specialty clinic within [facility].  It is an outpatient disease management program that is based on a phase-by-phase approach, which is tailored to each patient s individual needs.  The cardiologist, nurse practitioner, physician assistant and nurses provide an ongoing outpatient care and treatment plan that guides heart failure and cardiomyopathy patients from evaluation and education to stabilization. This team works with your current primary care doctor and cardiologist to help you:      Avoid  "hospitalizations    Slow the progression of the disease    Improve length and quality of life    Know who and when to call if heart failure symptoms appear    Receive easy access to quality health care and advice    Better understand your condition and treatment    Decrease the tremendous cost burden of heart failure care    Detect future heart problems before they become life threatening    Your C.O.R.E. Clinic Team will continue to educate you on your heart failure and may adjust medications based on your vital signs, lab work, and how you are feeling.  Therefore, it is very important to bring the following to all C.O.R.E. appointments:    - An accurate list of your medications  - Your medicine bottles  - Your weight chart/calendar    [facility] ([place]):    [phone]  [facility] ([place]):    [phone]  [facility] ([place]):  [phone]        Pending Results     Date and Time Order Name Status Description    8/3/2018 1132 Thyroid stimulating immunoglobulin In process     8/3/2018 1132 Thyroid peroxidase antibody In process     8/2/2018 1059 EKG 12-lead, tracing only Preliminary             Statement of Approval     Ordered          08/03/18 1008  I have reviewed and agree with all the recommendations and orders detailed in this document.  EFFECTIVE NOW     Approved and electronically signed by:  Kali Mayo MD             Admission Information     Date & Time Provider Department Dept. Phone    7/26/2018 Philippe Ureña,  [facility] [phone]      Your Vitals Were     Blood Pressure Pulse Temperature Respirations Height Weight    96/83 (BP Location: Left arm) 70 98.6  F (37  C) (Oral) 14 1.626 m (5' 4\") 68.3 kg (150 lb 8 oz)    Pulse Oximetry BMI (Body Mass Index)                97% 25.83 kg/m2          Care EveryWhere ID     This is your Care EveryWhere ID. This could be used by other " organizations to access your [facility] medical records  [id number]        Equal Access to Services     CECILE PARSON : Donna Carlos, ervin ellis, kimani starksmanuno daley, geena pengzachmathew fuentes. So [facility] [phone].    ATENCIÓN: Si habla español, tiene a luis disposición servicios gratuitos de asistencia lingüística. Llame al [phone].    We comply with applicable federal civil rights laws and [place] laws. We do not discriminate on the basis of race, color, national origin, age, disability, sex, sexual orientation, or gender identity.               Review of your medicines      START taking        Dose / Directions    flecainide acetate 150 MG Tabs   Used for:  Atrial fibrillation with RVR (H)        Dose:  150 mg   Take 1 tablet (150 mg) by mouth every 12 hours   Quantity:  180 tablet   Refills:  2       rivaroxaban ANTICOAGULANT 15 MG Tabs tablet   Commonly known as:  XARELTO   Used for:  Atrial fibrillation with RVR (H)        Dose:  15 mg   Take 1 tablet (15 mg) by mouth daily (with dinner)   Quantity:  30 tablet   Refills:  0         CONTINUE these medicines which may have CHANGED, or have new prescriptions. If we are uncertain of the size of tablets/capsules you have at home, strength may be listed as something that might have changed.        Dose / Directions    ranitidine 150 MG tablet   Commonly known as:  ZANTAC   This may have changed:  when to take this   Used for:  Epigastric pain        Dose:  150 mg   Take 1 tablet (150 mg) by mouth daily   Quantity:  30 tablet   Refills:  2         CONTINUE these medicines which have NOT CHANGED        Dose / Directions    albuterol 108 (90 Base) MCG/ACT Inhaler   Commonly known as:  PROAIR HFA/PROVENTIL HFA/VENTOLIN HFA   Used for:  Cough        Dose:  2 puff   Inhale 2 puffs into the lungs every 6 hours as needed for shortness of breath / dyspnea or wheezing   Quantity:  3 Inhaler   Refills:  1       bismuth  subsalicylate 262 MG/15ML suspension   Commonly known as:  PEPTO BISMOL        Dose:  15 mL   Take 15 mLs by mouth every 6 hours as needed for indigestion   Refills:  0         STOP taking     ondansetron 4 MG ODT tab   Commonly known as:  ZOFRAN ODT                Where to get your medicines      These medications were sent to [facility] [place], [place] - [address]  [address], [place] [address]     Phone:  [phone]     flecainide acetate 150 MG Tabs    ranitidine 150 MG tablet         Some of these will need a paper prescription and others can be bought over the counter. Ask your nurse if you have questions.     You don't need a prescription for these medications     rivaroxaban ANTICOAGULANT 15 MG Tabs tablet                Protect others around you: Learn how to safely use, store and throw away your medicines at www.disposemymeds.org.             Medication List: This is a list of all your medications and when to take them. Check marks below indicate your daily home schedule. Keep this list as a reference.      Medications           Morning Afternoon Evening Bedtime As Needed    albuterol 108 (90 Base) MCG/ACT Inhaler   Commonly known as:  PROAIR HFA/PROVENTIL HFA/VENTOLIN HFA   Inhale 2 puffs into the lungs every 6 hours as needed for shortness of breath / dyspnea or wheezing                                   bismuth subsalicylate 262 MG/15ML suspension   Commonly known as:  PEPTO BISMOL   Take 15 mLs by mouth every 6 hours as needed for indigestion                                   flecainide acetate 150 MG Tabs   Take 1 tablet (150 mg) by mouth every 12 hours   Last time this was given:  150 mg on 8/3/2018  8:28 AM   Next Dose Due:  Tonight at bedtime, 8/3/18                                      ranitidine 150 MG tablet   Commonly known as:  ZANTAC   Take 1 tablet (150 mg) by mouth daily   Last time this was given:  150 mg on 8/3/2018  8:28 AM   Next Dose Due:  Tomorrow  morning, 8/4/18                                   rivaroxaban ANTICOAGULANT 15 MG Tabs tablet   Commonly known as:  XARELTO   Take 1 tablet (15 mg) by mouth daily (with dinner)   Last time this was given:  20 mg on 8/2/2018  4:45 PM   Next Dose Due:  Tonight with dinner, 8/3/18                                             More Information        When You Have Hyperthyroidism  You have been diagnosed with hyperthyroidism. This means you have an overactive thyroid gland (hyperthyroidism). Thyroid hormone is important to your body's growth and metabolism. But if you have too much thyroid hormone, your body's processes may speed up or overreact. This can cause a variety of symptoms. Hyperthyroidism is treated with medicines, radiation, or surgery. Below are instructions for self-care and follow-up care.  Taking your medicine    Take your medicine exactly as directed.     Take your medicine at the same time every day. Keep your pills in a container that is labeled with the days of the week. This will help you remember if you ve taken your medicine each day.    Try to take your medicine with the same food or drink each day. This will help you control the amount of thyroid hormone in your body.    Don t stop taking medicine. If you do, your symptoms will return. Only make changes to your medicine routine as your healthcare provider instructs.    Keep a card in your wallet that says you have hyperthyroidism. Make sure it has your name and address, contact information for your healthcare provider, and the names and doses of your medicines.  Keeping track of symptoms  During your routine visits, tell your healthcare provider if you have any symptoms of too little thyroid hormone (hypothyroidism). This can be a side effect of treatment. Also tell your healthcare provider if you have symptoms of too much thyroid hormone (hyperthyroidism).  Symptoms of hypothyroidism include:    Tiredness or low energy    Puffy hands, face, or  feet    Hoarseness    Muscle pain    Slow heartbeat (less than 60 beats per minute)    Feeling unusually cold when others feel comfortable  Symptoms of hyperthyroidism include:    Restlessness    Fast weight loss    Sweating    Fast heartbeat (more than 100 beats per minute)    Feeling unusually hot when others feel comfortable  Follow-up care  Make a follow-up appointment as directed by our staff. Make and keep appointments to see your healthcare provider and have blood tests. You will need to have blood test for the rest of your life to check your hormone levels.  To learn more  The resources below can help you learn more:    American Thyroid Association [phone] www.thyroid.org    [facility] [phone] www.hormone.org  When to call your healthcare provider  Call your healthcare provider right away if you have any of the following:    Anxiety, shakiness, or sleeplessness that gets worse    Sore throat while taking medicines to control hyperthyroidism    Fever of 100.4 F (38 C) or higher, or as advised by your healthcare provider    Feeling sweaty and hot when others around you are comfortable    Shortness of breath    Trouble focusing your eyes or double vision    Bulging eyes    Weight loss for no obvious reason    Fast heartbeat at rest (more than 100 beats per minute)    Enlarged thyroid gland (goiter) that gets larger    Diarrhea   Date Last Reviewed: 11/1/2016 2000-2017 [employer]. [address] [place] [address], [place] [address]. All rights reserved. This information is not intended as a substitute for professional medical care. Always follow your healthcare professional's instructions.                Flecainide tablets  Brand Name: Tambocor  What is this medicine?  FLECAINIDE (FLEK a nide) is an antiarrhythmic drug. This medicine is used to prevent irregular heart rhythm. It can also slow down fast heartbeats called tachycardia.  How should I use this medicine?  Take this  medicine by mouth with a glass of water. Follow the directions on the prescription label. You can take this medicine with or without food. Take your doses at regular intervals. Do not take your medicine more often than directed. Do not stop taking this medicine suddenly. This may cause serious, heart-related side effects. If your doctor wants you to stop the medicine, the dose may be slowly lowered over time to avoid any side effects.  Talk to your pediatrician regarding the use of this medicine in children. While this drug may be prescribed for children as young as 1 year of age for selected conditions, precautions do apply.  What side effects may I notice from receiving this medicine?  Side effects that you should report to your doctor or health care professional as soon as possible:    chest pain, continued irregular heartbeats    difficulty breathing    swelling of the legs or feet    trembling, shaking    unusually weak or tired  Side effects that usually do not require medical attention (report to your doctor or health care professional if they continue or are bothersome):    blurred vision    constipation    headache    nausea, vomiting    stomach pain  What may interact with this medicine?  Do not take this medicine with any of the following medications:    amoxapine    arsenic trioxide    certain antibiotics like clarithromycin, erythromycin, gatifloxacin, gemifloxacin, levofloxacin, moxifloxacin, sparfloxacin, or troleandomycin    certain antidepressants called tricyclic antidepressants like amitriptyline, imipramine, or nortriptyline    certain medicines to control heart rhythm like disopyramide, dofetilide, encainide, moricizine, procainamide, propafenone, and quinidine    cisapride    cyclobenzaprine    delavirdine    droperidol    haloperidol    hawthorn    imatinib    levomethadyl    maprotiline    medicines for malaria like chloroquine and halofantrine    pentamidine    phenothiazines like  chlorpromazine, mesoridazine, prochlorperazine, thioridazine    pimozide    quinine    ranolazine    ritonavir    sertindole    ziprasidone  This medicine may also interact with the following medications:    cimetidine    medicines for angina or high blood pressure    medicines to control heart rhythm like amiodarone and digoxin  What if I miss a dose?  If you miss a dose, take it as soon as you can. If it is almost time for your next dose, take only that dose. Do not take double or extra doses.  Where should I keep my medicine?  Keep out of the reach of children.  Store at room temperature between 15 and 30 degrees C (59 and 86 degrees F). Protect from light. Keep container tightly closed. Throw away any unused medicine after the expiration date.  What should I tell my health care provider before I take this medicine?  They need to know if you have any of these conditions:    abnormal levels of potassium in the blood    heart disease including heart rhythm and heart rate problems    kidney or liver disease    recent heart attack    an unusual or allergic reaction to flecainide, local anesthetics, other medicines, foods, dyes, or preservatives    pregnant or trying to get pregnant    breast-feeding  What should I watch for while using this medicine?  Visit your doctor or health care professional for regular checks on your progress. Because your condition and the use of this medicine carries some risk, it is a good idea to carry an identification card, necklace or bracelet with details of your condition, medications and doctor or health care professional.  Check your blood pressure and pulse rate regularly. Ask your health care professional what your blood pressure and pulse rate should be, and when you should contact him or her. Your doctor or health care professional also may schedule regular blood tests and electrocardiograms to check your progress.  You may get drowsy or dizzy. Do not drive, use machinery, or do  anything that needs mental alertness until you know how this medicine affects you. Do not stand or sit up quickly, especially if you are an older patient. This reduces the risk of dizzy or fainting spells. Alcohol can make you more dizzy, increase flushing and rapid heartbeats. Avoid alcoholic drinks.  NOTE:This sheet is a summary. It may not cover all possible information. If you have questions about this medicine, talk to your doctor, pharmacist, or health care provider. Copyright  2018 [employer]                Rivaroxaban Oral tablet  What is this medicine?  RIVAROXABAN (ri va ADE a ban) is an anticoagulant (blood thinner). It is used to treat blood clots in the lungs or in the veins. It is also used after knee or hip surgeries to prevent blood clots. It is also used to lower the chance of stroke in people with a medical condition called atrial fibrillation.  This medicine may be used for other purposes; ask your health care provider or pharmacist if you have questions.  What should I tell my health care provider before I take this medicine?  They need to know if you have any of these conditions:    bleeding disorders    bleeding in the brain    blood in your stools (black or tarry stools) or if you have blood in your vomit    history of stomach bleeding    kidney disease    liver disease    low blood counts, like low white cell, platelet, or red cell counts    recent or planned spinal or epidural procedure    take medicines that treat or prevent blood clots    an unusual or allergic reaction to rivaroxaban, other medicines, foods, dyes, or preservatives    pregnant or trying to get pregnant    breast-feeding  How should I use this medicine?  Take this medicine by mouth with a glass of water. Follow the directions on the prescription label. Take your medicine at regular intervals. Do not take it more often than directed. Do not stop taking except on your doctor's advice. Stopping this medicine may increase your  risk of a blot clot. Be sure to refill your prescription before you run out of medicine.  If you are taking this medicine after hip or knee replacement surgery, take it with or without food. If you are taking this medicine for atrial fibrillation, take it with your evening meal. If you are taking this medicine to treat blood clots, take it with food at the same time each day. If you are unable to swallow your tablet, you may crush the tablet and mix it in applesauce. Then, immediately eat the applesauce. You should eat more food right after you eat the applesauce containing the crushed tablet.  Talk to your pediatrician regarding the use of this medicine in children. Special care may be needed.  Overdosage: If you think you have taken too much of this medicine contact a poison control center or emergency room at once.  NOTE: This medicine is only for you. Do not share this medicine with others.  What if I miss a dose?  If you take your medicine once a day and miss a dose, take the missed dose as soon as you remember. If you take your medicine twice a day and miss a dose, take the missed dose immediately. In this instance, 2 tablets may be taken at the same time. The next day you should take 1 tablet twice a day as directed.  What may interact with this medicine?    aspirin and aspirin-like medicines    certain antibiotics like erythromycin, azithromycin, and clarithromycin    certain medicines for fungal infections like ketoconazole and itraconazole    certain medicines for irregular heart beat like amiodarone, quinidine, dronedarone    certain medicines for seizures like carbamazepine, phenytoin    certain medicines that treat or prevent blood clots like warfarin, enoxaparin, and dalteparin    conivaptan    diltiazem    felodipine    indinavir    lopinavir; ritonavir    NSAIDS, medicines for pain and inflammation, like ibuprofen or naproxen    ranolazine    rifampin    ritonavir    Ruben's  wort    verapamil  This list may not describe all possible interactions. Give your health care provider a list of all the medicines, herbs, non-prescription drugs, or dietary supplements you use. Also tell them if you smoke, drink alcohol, or use illegal drugs. Some items may interact with your medicine.  What should I watch for while using this medicine?  Visit your doctor or health care professional for regular checks on your progress. Your condition will be monitored carefully while you are receiving this medicine.  If you are going to have surgery, tell your doctor or health care professional that you are taking this medicine.  Avoid sports and activities that might cause injury while you are using this medicine. Severe falls or injuries can cause unseen bleeding. Be careful when using sharp tools or knives. Consider using an electric razor. Take special care brushing or flossing your teeth. Report any injuries, bruising, or red spots on the skin to your doctor or health care professional.  What side effects may I notice from receiving this medicine?  Side effects that you should report to your doctor or health care professional as soon as possible:    allergic reactions like skin rash, itching or hives, swelling of the face, lips, or tongue    back pain    confusion, trouble speaking or understanding    redness, blistering, peeling or loosening of the skin, including inside the mouth    signs and symptoms of bleeding such as bloody or black, tarry stools; red or dark-brown urine; spitting up blood or brown material that looks like coffee grounds; red spots on the skin; unusual bruising or bleeding from the eye, gums, or nose    signs and symptoms of a blood clot such as breathing problems; changes in vision; chest pain; severe, sudden headache; pain, swelling, warmth in the leg; trouble speaking; sudden numbness or weakness of the face, arm, or leg    trouble walking, dizziness, loss of balance or  coordination  Side effects that usually do not require medical attention (Report these to your doctor or health care professional if they continue or are bothersome.):    dizziness    muscle pain  This list may not describe all possible side effects. Call your doctor for medical advice about side effects. You may report side effects to FDA at 5-388-VQX-8557.  Where should I keep my medicine?  Keep out of the reach of children.  Store at room temperature between 15 and 30 degrees C (59 and 86 degrees F). Throw away any unused medicine after the expiration date.  NOTE: This sheet is a summary. It may not cover all possible information. If you have questions about this medicine, talk to your doctor, pharmacist, or health care provider.  NOTE:This sheet is a summary. It may not cover all possible information. If you have questions about this medicine, talk to your doctor, pharmacist, or health care provider. Copyright  2014 [employer]                Ranitidine tablets or capsules  Brand Names: Acid Reducer, Ranitidine, Taladine, Wal-Gagan, Zantac, Zantac 150, Zantac 75  What is this medicine?  RANITIDINE (lissette brown) is a type of antihistamine that blocks the release of stomach acid. It is used to treat stomach or intestinal ulcers. It can relieve ulcer pain and discomfort, and the heartburn from acid reflux.  How should I use this medicine?  Take this medicine by mouth with a glass of water. Follow the directions on the prescription label. If you only take this medicine once a day, take it at bedtime. Take your medicine at regular intervals. Do not take your medicine more often than directed. Do not stop taking except on your doctor's advice.  Talk to your pediatrician regarding the use of this medicine in children. Special care may be needed.  What side effects may I notice from receiving this medicine?  Side effects that you should report to your doctor or health care professional as soon as  possible:    agitation, nervousness, depression, hallucinations    allergic reactions like skin rash, itching or hives, swelling of the face, lips, or tongue    breast enlargement in both males and females    breathing problems    redness, blistering, peeling or loosening of the skin, including inside the mouth    unusual bleeding or bruising    unusually weak or tired    vomiting    yellowing of the skin or eyes  Side effects that usually do not require medical attention (report to your doctor or health care professional if they continue or are bothersome):    constipation or diarrhea    dizziness    headache    nausea  What may interact with this medicine?    atazanavir    delavirdine    gefitinib    glipizide    ketoconazole    midazolam    procainamide    propantheline    triazolam    warfarin    What if I miss a dose?  If you miss a dose, take it as soon as you can. If it is almost time for your next dose, take only that dose. Do not take double or extra doses.  Where should I keep my medicine?  Keep out of the reach of children.  Store at room temperature between 15 and 30 degrees C (59 and 86 degrees F). Protect from light and moisture. Keep container tightly closed. Throw away any unused medicine after the expiration date.  What should I tell my health care provider before I take this medicine?  They need to know if you have any of these conditions:    kidney disease    liver disease    porphyria    an unusual or allergic reaction to ranitidine, other medicines, foods, dyes, or preservatives    pregnant or trying to get pregnant    breast-feeding  What should I watch for while using this medicine?  Tell your doctor or health care professional if your condition does not start to get better or gets worse. You may need to take this medicine for several days as prescribed before your symptoms get better. Finish the full course of tablets prescribed, even if you feel better.  Do not smoke cigarettes or drink  alcohol. These increase irritation in your stomach and can lengthen the time it will take for ulcers to heal. Cigarettes and alcohol can also make acid reflux or heartburn worse.  If you get black, tarry stools or vomit up what looks like coffee grounds, call your doctor or health care professional at once. You may have a bleeding ulcer.  NOTE:This sheet is a summary. It may not cover all possible information. If you have questions about this medicine, talk to your doctor, pharmacist, or health care provider. Copyright  2018 [employer]                Discharge Instructions for Atrial Fibrillation  You have been diagnosed with an abnormal heart rhythm called atrial fibrillation. With this condition, your heart s 2 upper chambers quiver rather than squeeze the blood out in a normal pattern. This leads to an irregular and sometimes rapid heartbeat. Some people will develop associated symptoms such as a flip-flopping heartbeat, chest pain, lightheadedness, or shortness of breath. Other people may have no symptoms at all. Atrial fibrillation is serious because it affects the heart s ability to fill with blood as it should. Blood clots may form. This increases the risk for stroke. Untreated atrial fibrillation can also lead to heart failure. Atrial fibrillation can be controlled. With treatment, most people with atrial fibrillation lead normal lives.  Treatment options  Recommended treatment for atrial fibrillation depends on your age, symptoms, how long you have had atrial fibrillation, and other factors. You will have a complete evaluation to find out if you have any abnormalities that caused your heart to go into atrial fibrillation. This might be blocked heart arteries or a thyroid problem. Your doctor will assess your particular case and discuss choices with you.  Treatment choices may include:    Treating an underlying disorder that puts you at risk for atrial fibrillation. For example, correcting an abnormal thyroid  or electrolyte problem, or treating a blocked heart artery.    Restoring a normal heart rhythm with an electrical shock (cardioversion) or with an antiarrhythmic medicine (chemical cardioversion)    Using medicine to control your heart rate in atrial fibrillation.    Preventing the risk for blood clot and stroke using blood-thinning medicines. Your doctor will tell you what he or she recommends. Choices may include aspirin, clopidogrel, warfarin, dabigatran, rivaroxaban, apixaban, and edoxaban.    Doing catheter ablation or a surgical maze procedure. These use different methods to destroy certain areas of heart tissue. This interrupts the electrical signals causing atrial fibrillation. One of these procedures may be a choice when medicines do not work, or as an alternative to long-term medicine.    Other treatment choices may be recommended for you by your doctor.  Managing risk factors for stroke and preventing heart failure are important parts of any treatment plan for atrial fibrillation.  Home care    Take your medicines exactly as directed. Don t skip doses.    Work with your doctor to find the right medicines and doses for you.    Learn to take your own pulse. Keep a record of your results. Ask your doctor which pulse rates mean that you need medical attention. Slowing your pulse is often the goal of treatment. Ask your doctor if it s OK for you to use an automatic machine to check your pulse at home. Sometimes these machines don t count the pulse correctly when you have atrial fibrillation.    Limit your intake of coffee, tea, cola, and other beverages with caffeine. Talk with your doctor about whether you should eliminate caffeine.    Avoid over-the-counter medicines that have caffeine in them.    Let your doctor know what medicines you take, including prescription and over-the-counter medicines, as well as any supplements. They interfere with some medicines given for atrial fibrillation.    Ask your doctor  about whether you can drink alcohol. Some people need to avoid alcohol to better treat atrial fibrillation. If you are taking blood-thinner medicines, alcohol may interfere with them by increasing their effect.    Never take stimulants such as amphetamines or cocaine. These drugs can speed up your heart rate and trigger atrial fibrillation.  Follow-up care  Follow up with your doctor, or as advised.     When should I call my healthcare provider  Call your healthcare provider right away if you have any of the following:    Weakness    Dizziness    Fainting    Fatigue    Shortness of breath    Chest pain with increased activity    A change in the usual regularity of your heartbeat, or an unusually fast heartbeat   Date Last Reviewed: 4/23/2016 2000-2017 [employer]. [address] [place] [address], [place] [id number]. All rights reserved. This information is not intended as a substitute for professional medical care. Always follow your healthcare professional's instructions.                Understanding Atrial Fibrillation    An arrhythmia is any problem with the speed or pattern of the heartbeat. Atrial fibrillation (AFib) is a common type of arrhythmia. It causes fast, chaotic electrical signals in the atria. This leads to poor functioning of the heart. It also affects how much blood your heart can pump out to the body.  Afib may occur once in a while and go away on its own. Or it may continue for longer periods and need treatment.  AFib can lead to serious problems, such as stroke. Your healthcare provider will need to monitor and manage it.  What happens during atrial fibrillation?   The heart has an electrical system that sends signals to control the heartbeat. As signals move through the heart, they tell the heart s upper chambers (atria) and lower chambers (ventricles) when to squeeze (contract) and relax. This lets blood move through the heart and out to the body and lungs.  With AFib, the atria receive  abnormal signals. This causes them to contract in a fast and irregular way, and out of sync with the ventricles. When this happens, the atria also have a harder time moving blood into the ventricles. Blood may then pool in the atria, which increases the risk for blood clots and stroke. The ventricles also may contract too quickly and irregularly. As a result, they may not pump blood to the body and lungs as well as they should. This can weaken the heart muscle over time and cause heart failure.  What causes atrial fibrillation?  AFib is more common in older adults. It has many possible causes including:    Coronary artery disease    Heart valve disease    Heart attack    Heart surgery    High blood pressure    Thyroid disease    Diabetes    Lung disease    Sleep apnea    Heavy alcohol use  In some cases of AFib, doctors do not know the cause.  What are the symptoms of atrial fibrillation?  AFib may or may not cause symptoms. If symptoms do occur, they may include:    A fast, pounding, irregular heartbeat    Shortness of breath    Tiredness    Dizziness or fainting    Chest pain  How is atrial fibrillation treated?  Treatments for AFib can include any of the options below.    Medicines. You may be prescribed:  ? Heart rate medicines to help slow down the heartbeat  ? Heart rhythm medicines to help the heart beat more regularly  ? Anti-clotting medicines to help reduce the risk for blood clots and stroke    Electrical cardioversion. Your healthcare provider uses special pads or paddles to send one or more brief electrical shocks to the heart. This can help reset the heartbeat to normal.    Ablation. Long, thin tubes called catheters are threaded through a blood vessel to the heart. There, the catheters send out hot or cold energy to the areas causing the abnormal signals. This energy destroys the problem tissue or cells. This improves the chances that your heart will stay in normal rhythm without using medicines. If  your heart rate and rhythm can t be controlled, you may need ablation and a pacemaker. These will help control the heart rate and regularity of the heartbeat.    Surgery. During surgery, your healthcare provider may use different methods to create scar tissue in the areas of the heart causing the abnormal signals. The scar tissue disrupts the abnormal signals and may stop AFib from occurring.  What are the complications of atrial fibrillation?  These can include:    Blood clots    Stroke    Heart failure. This problem occurs when the heart muscle weakens so much that it can no longer pump blood well.  When should I call my healthcare provider?  Call your healthcare provider right away if you have any of these:    Symptoms that don t get better with treatment, or get worse    New symptoms   Date Last Reviewed: 5/1/2016 2000-2017 [employer]. [address] [place] [address], [place] [address]. All rights reserved. This information is not intended as a substitute for professional medical care. Always follow your healthcare professional's instructions.

## 2018-07-26 NOTE — H&P
[facility]    History and Physical  Hospitalist       Date of Admission:  7/26/2018  Date of Service (when I saw the patient): 07/26/18    Assessment & Plan   Christ Servin is a 58 year old female who presents with Shortness of breath.Admitted for further evaluation and treatment.    Atrial fibrillation with rapid ventricular rate, with acute congestive heart failure exacerbation: Patient with elevated heart rate on admission. Metoprolol given in the emergency department.  The patient states that she has had a recent episode of bronchitis and has been on antibiotics as well as prednisone prior to presentation.  The patient states that her shortness of breath is worse upon lying down, she also reports abdominal distention on admission as well as swelling in both of her feet.  Bilirubin is mildly elevated at 1.5, however this is improved from previous of 1.8 a few days prior.  The patient has a lipase level of 139, basic natruretic peptide level of 7626, TSH of 4.78, with a T4 free level of 1.65.  Troponin is less than 0.015 on admission.  Patient did have a CT of the abdomen completed 3 days prior to admission showing mild ascites, diverticulosis, and bilateral pleural effusions, greater on the right than the left, per report.  - Heparin drip.  - Diltiazem drip.  Wean as able.  - IV metoprolol p.r.n.  - Serial troponins.  - Cardiology consult.  - Chads score is approximately 1.  - Consider echocardiogram.    Acute kidney injury, stage II: Patient with a creatinine of 1.20 on admission, elevated from baseline of approximately 1.04.  - Monitor.  - Rate control of heart rate.  - Avoid nephrotoxins.    Abdominal ascites: Patient with CT Abd completed noting ascites. U/s ruq completed with prior cholecystectomy, and no evidence for fatty infiltration. ALT elevated on 7/1, however, improving on 7/26/18.   - Monitor.      Hyperthyroidism: Patient with a TSH of 4.78, and a free T4 level of 1.65.  - Thyroid  ultrasound, however, patient will need outpatient evaluation with endocrinology.    GERD: Stable.  - Continue prior to admission ranitidine.    # Pain Assessment:  Current Pain Score 7/26/2018   Pain score (0-10) 7   - Christ is experiencing pain due to chest. Pain management was discussed and the plan was created in a collaborative fashion.  Christ's response to the current recommendations: mixed response  - Please see the plan for pain management as documented above    DVT Prophylaxis: Heparin gtt.   Code Status: Full Code    Disposition: Inpatient.    Dr. Philippe Ureña D.O.  [facility] Hospitalist  Pager [phone]    Primary Care Physician   [facility]    Chief Complaint   Shortness of breath.    History is obtained from the patient and medical records.     History of Present Illness   Christ Servin is a 58 year old female who presents with Shortness of breath.Admitted for further evaluation and treatment. Patient with elevated heart rate on admission. Metoprolol given in the emergency department.  The patient states that she has had a recent episode of bronchitis and has been on antibiotics as well as prednisone prior to presentation.  The patient states that her shortness of breath is worse upon lying down, she also reports abdominal distention on admission as well as swelling in both of her feet.  Bilirubin is mildly elevated at 1.5, however this is improved from previous of 1.8 a few days prior.  The patient has a lipase level of 139, basic natruretic peptide level of 7626, TSH of 4.78, with a T4 free level of 1.65.  Troponin is less than 0.015 on admission.  Patient did have a CT of the abdomen completed 3 days prior to admission showing mild ascites, diverticulosis, and bilateral pleural effusions, greater on the right than the left, per report.  Patient denies sore throat, productive cough, dysuria, blood in the stool or urine, rash.  The patient does endorse nausea, vomiting,  chest discomfort, shortness of breath, and lower extremity edema.    Past Medical History    I have reviewed this patient's medical history and updated it with pertinent information if needed.   GERD  Bronchitis    Past Surgical History   I have reviewed this patient's surgical history and updated it with pertinent information if needed.  Past Surgical History:   Procedure Laterality Date     CHOLECYSTECTOMY         Prior to Admission Medications   Prior to Admission Medications   Prescriptions Last Dose Informant Patient Reported? Taking?   albuterol (PROAIR HFA/PROVENTIL HFA/VENTOLIN HFA) 108 (90 Base) MCG/ACT Inhaler   No No   Sig: Inhale 2 puffs into the lungs every 6 hours as needed for shortness of breath / dyspnea or wheezing   bismuth subsalicylate (PEPTO BISMOL) 262 MG/15ML suspension   Yes No   Sig: Take 15 mLs by mouth every 6 hours as needed for indigestion   methylPREDNISolone (MEDROL DOSEPAK) 4 MG tablet   No No   Sig: Follow package instructions   ondansetron (ZOFRAN ODT) 4 MG ODT tab   No No   Sig: Take 1 tablet (4 mg) by mouth every 8 hours as needed for nausea   ranitidine (ZANTAC) 150 MG tablet   No No   Sig: Take 1 tablet (150 mg) by mouth 2 times daily for 14 days   ranitidine (ZANTAC) 150 MG tablet   No No   Sig: Take 1 tablet (150 mg) by mouth 2 times daily for 14 days      Facility-Administered Medications: None     Allergies   No Known Allergies    Social History   I have reviewed this patient's social history and updated it with pertinent information if needed. Christ Servin  reports that she has never smoked. She has never used smokeless tobacco. She reports that she does not drink alcohol or use illicit drugs.    Family History   I have reviewed this patient's family history and updated it with pertinent information if needed.   Patient denies family history in mother or father of heart rate-related issues.    Review of Systems   The 10 point Review of Systems is negative other than  noted in the HPI or here.     Physical Exam   Temp: 98.1  F (36.7  C) Temp src: Oral BP: (!) 120/94 Pulse: 82 Heart Rate: 105 Resp: 19 SpO2: 92 % O2 Device: None (Room air)    Vital Signs with Ranges  Temp:  [98.1  F (36.7  C)] 98.1  F (36.7  C)  Pulse:  [82] 82  Heart Rate:  [104-200] 105  Resp:  [13-31] 19  BP: (112-142)/(82-99) 120/94  SpO2:  [92 %-97 %] 92 %  176 lbs 5.89 oz    GENERAL: Alert and oriented. NAD. Conversational, appropriate.   HEENT: Normocephalic. EOMI. No icterus or injection. Nares normal.   LUNGS: Decrement to bases bilaterally. No dyspnea at rest.   HEART: Irregularly irregular rate and tachycardic. Extremities perfused.   ABDOMEN: Obese.  Soft, nontender, and mildly distended. Positive bowel sounds.   EXTREMITIES: LE edema noted.   NEUROLOGIC: Moves extremities x4 on command. No acute focal neurologic abnormalities noted.     Data   Data reviewed today:  I personally reviewed both laboratory and imaging data.     Recent Labs  Lab 07/26/18  0538 07/23/18  1016   WBC 8.6 7.5   HGB 14.8 14.8   MCV 94 93    181    142   POTASSIUM 4.7 4.3   CHLORIDE 110* 111*   CO2 22 21   BUN 16 18   CR 1.20* 1.04   ANIONGAP 11 10   ANKUSH 8.6 8.3*   * 145*   ALBUMIN 3.6 3.6   PROTTOTAL 6.7* 7.3   BILITOTAL 1.5* 1.8*   ALKPHOS 101 117   ALT 51* 64*   AST 41 41   LIPASE 139 142   TROPI <0.015 <0.015       Recent Results (from the past 24 hour(s))   Chest XR,  PA & LAT    Narrative    XR CHEST 2 VW 7/26/2018 7:26 AM    COMPARISON: 7/1/2018    HISTORY: Chest pain and shortness of breath while lying down.      Impression    IMPRESSION: Small RIGHT and trace LEFT pleural effusions, slightly  increased on the RIGHT and unchanged on the LEFT since 7/1/2018. There  is associated atelectasis. No pneumothorax seen on either side.    YE GONZALEZ MD

## 2018-07-26 NOTE — ED NOTES
[facility]  ED Nurse Handoff Report    ED Chief complaint: Abdominal Pain (patient here with on and off abdominal pain with nausea shortness of breath for one month)      ED Diagnosis:   Final diagnoses:   Atrial fibrillation with RVR (H)   Shortness of breath   Epigastric pain       Code Status: Full Code    Allergies: No Known Allergies    Activity level - Baseline/Home:  Independent    Activity Level - Current:   Independent     Needed?: No    Isolation: No  Infection: Not Applicable  Bariatric?: No    Vital Signs:   Vitals:    07/26/18 0700 07/26/18 0705 07/26/18 0710 07/26/18 0730   BP: 118/87  (!) 118/99 (!) 120/94   Pulse:       Resp: 13 20 19    Temp:       TempSrc:       SpO2: 93% 92% 92%    Weight:       Height:           Cardiac Rhythm: ,        Pain level: 0-10 Pain Scale: 7    Is this patient confused?: No   [place] - Suicide Severity Rating Scale Completed?  Yes  If yes, what color did the patient score?  White    Patient Report: Initial Complaint: SOB  Focused Assessment:  Pt was here few days ago for SOB and epigastric pain. Pt woke up with morning with increased SOB, pt arrived in A-fib RVR. Pt is on dilt gtt at 5ml/hr. Pt to be admitted for further evaluation. Pt continues to be in A-fib  Tests Performed: CXR, labs  Abnormal Results:   Results for orders placed or performed during the hospital encounter of 07/26/18   Chest XR,  PA & LAT    Narrative    XR CHEST 2 VW 7/26/2018 7:26 AM    COMPARISON: 7/1/2018    HISTORY: Chest pain and shortness of breath while lying down.      Impression    IMPRESSION: Small RIGHT and trace LEFT pleural effusions, slightly  increased on the RIGHT and unchanged on the LEFT since 7/1/2018. There  is associated atelectasis. No pneumothorax seen on either side.    YE GONZALEZ MD   CBC with platelets differential   Result Value Ref Range    WBC 8.6 4.0 - 11.0 10e9/L    RBC Count 4.91 3.8 - 5.2 10e12/L    Hemoglobin 14.8 11.7 - 15.7 g/dL     Hematocrit 46.2 35.0 - 47.0 %    MCV 94 78 - 100 fl    MCH 30.1 26.5 - 33.0 pg    MCHC 32.0 31.5 - 36.5 g/dL    RDW 14.5 10.0 - 15.0 %    Platelet Count 171 150 - 450 10e9/L    Diff Method Automated Method     % Neutrophils 63.0 %    % Lymphocytes 29.8 %    % Monocytes 6.6 %    % Eosinophils 0.3 %    % Basophils 0.2 %    % Immature Granulocytes 0.1 %    Nucleated RBCs 0 0 /100    Absolute Neutrophil 5.4 1.6 - 8.3 10e9/L    Absolute Lymphocytes 2.6 0.8 - 5.3 10e9/L    Absolute Monocytes 0.6 0.0 - 1.3 10e9/L    Absolute Eosinophils 0.0 0.0 - 0.7 10e9/L    Absolute Basophils 0.0 0.0 - 0.2 10e9/L    Abs Immature Granulocytes 0.0 0 - 0.4 10e9/L    Absolute Nucleated RBC 0.0    Comprehensive metabolic panel   Result Value Ref Range    Sodium 143 133 - 144 mmol/L    Potassium 4.7 3.4 - 5.3 mmol/L    Chloride 110 (H) 94 - 109 mmol/L    Carbon Dioxide 22 20 - 32 mmol/L    Anion Gap 11 3 - 14 mmol/L    Glucose 116 (H) 70 - 99 mg/dL    Urea Nitrogen 16 7 - 30 mg/dL    Creatinine 1.20 (H) 0.52 - 1.04 mg/dL    GFR Estimate 46 (L) >60 mL/min/1.7m2    GFR Estimate If Black 56 (L) >60 mL/min/1.7m2    Calcium 8.6 8.5 - 10.1 mg/dL    Bilirubin Total 1.5 (H) 0.2 - 1.3 mg/dL    Albumin 3.6 3.4 - 5.0 g/dL    Protein Total 6.7 (L) 6.8 - 8.8 g/dL    Alkaline Phosphatase 101 40 - 150 U/L    ALT 51 (H) 0 - 50 U/L    AST 41 0 - 45 U/L   Lipase   Result Value Ref Range    Lipase 139 73 - 393 U/L   Nt probnp inpatient   Result Value Ref Range    N-Terminal Pro BNP Inpatient 7626 (H) 0 - 900 pg/mL   Troponin I   Result Value Ref Range    Troponin I ES <0.015 0.000 - 0.045 ug/L   Magnesium   Result Value Ref Range    Magnesium 1.8 1.6 - 2.3 mg/dL   TSH with free T4 reflex   Result Value Ref Range    TSH 4.78 (H) 0.40 - 4.00 mU/L   T4 free   Result Value Ref Range    T4 Free 1.65 (H) 0.76 - 1.46 ng/dL   EKG 12 lead   Result Value Ref Range    Interpretation ECG Click View Image link to view waveform and result        Treatments provided: dilt  gtt    Family Comments: SO at bedside    OBS brochure/video discussed/provided to patient: N/A    ED Medications:   Medications   lidocaine (viscous) (XYLOCAINE) 2 % 15 mL, alum & mag hydroxide-simethicone (MYLANTA ES/MAALOX  ES) 15 mL GI Cocktail (not administered)   diltiazem (CARDIZEM) 125 mg in sodium chloride 0.9 % 125 mL infusion (5 mg/hr Intravenous New Bag 7/26/18 0701)   diltiazem (CARDIZEM) injection 10 mg (10 mg Intravenous Given 7/26/18 0648)   sodium chloride 0.9% infusion ( Intravenous New Bag 7/26/18 0707)       Drips infusing?:  Yes    For the majority of the shift this patient was Green.   Interventions performed were none.    Severe Sepsis OR Septic Shock Diagnosis Present: No      ED NURSE PHONE NUMBER: [phone]

## 2018-07-27 LAB
ALBUMIN SERPL-MCNC: 3.9 G/DL (ref 3.4–5)
ALP SERPL-CCNC: 103 U/L (ref 40–150)
ALT SERPL W P-5'-P-CCNC: 48 U/L (ref 0–50)
ANION GAP SERPL CALCULATED.3IONS-SCNC: 8 MMOL/L (ref 3–14)
AST SERPL W P-5'-P-CCNC: 31 U/L (ref 0–45)
BILIRUB DIRECT SERPL-MCNC: 0.7 MG/DL (ref 0–0.2)
BILIRUB SERPL-MCNC: 1.8 MG/DL (ref 0.2–1.3)
BUN SERPL-MCNC: 18 MG/DL (ref 7–30)
CALCIUM SERPL-MCNC: 8.9 MG/DL (ref 8.5–10.1)
CHLORIDE SERPL-SCNC: 104 MMOL/L (ref 94–109)
CO2 SERPL-SCNC: 32 MMOL/L (ref 20–32)
CREAT SERPL-MCNC: 1.33 MG/DL (ref 0.52–1.04)
ERYTHROCYTE [DISTWIDTH] IN BLOOD BY AUTOMATED COUNT: 14.5 % (ref 10–15)
GFR SERPL CREATININE-BSD FRML MDRD: 41 ML/MIN/1.7M2
GLUCOSE SERPL-MCNC: 116 MG/DL (ref 70–99)
HCT VFR BLD AUTO: 42.9 % (ref 35–47)
HGB BLD-MCNC: 13.9 G/DL (ref 11.7–15.7)
INR PPP: 1.02 (ref 0.86–1.14)
LMWH PPP CHRO-ACNC: 0.17 IU/ML
LMWH PPP CHRO-ACNC: 0.41 IU/ML
MCH RBC QN AUTO: 30 PG (ref 26.5–33)
MCHC RBC AUTO-ENTMCNC: 32.4 G/DL (ref 31.5–36.5)
MCV RBC AUTO: 93 FL (ref 78–100)
PLATELET # BLD AUTO: 161 10E9/L (ref 150–450)
POTASSIUM SERPL-SCNC: 3.5 MMOL/L (ref 3.4–5.3)
PROT SERPL-MCNC: 6.9 G/DL (ref 6.8–8.8)
RBC # BLD AUTO: 4.63 10E12/L (ref 3.8–5.2)
SODIUM SERPL-SCNC: 144 MMOL/L (ref 133–144)
T3 SERPL-MCNC: 82 NG/DL (ref 60–181)
T3FREE SERPL-MCNC: 2.2 PG/ML (ref 2.3–4.2)
T4 FREE SERPL-MCNC: 1.78 NG/DL (ref 0.76–1.46)
TSH SERPL DL<=0.005 MIU/L-ACNC: 3.5 MU/L (ref 0.4–4)
WBC # BLD AUTO: 7.3 10E9/L (ref 4–11)

## 2018-07-27 PROCEDURE — 84439 ASSAY OF FREE THYROXINE: CPT | Performed by: INTERNAL MEDICINE

## 2018-07-27 PROCEDURE — 36415 COLL VENOUS BLD VENIPUNCTURE: CPT | Performed by: HOSPITALIST

## 2018-07-27 PROCEDURE — 80076 HEPATIC FUNCTION PANEL: CPT | Performed by: INTERNAL MEDICINE

## 2018-07-27 PROCEDURE — 25000132 ZZH RX MED GY IP 250 OP 250 PS 637: Performed by: HOSPITALIST

## 2018-07-27 PROCEDURE — 36415 COLL VENOUS BLD VENIPUNCTURE: CPT | Performed by: INTERNAL MEDICINE

## 2018-07-27 PROCEDURE — 25000125 ZZHC RX 250: Performed by: INTERNAL MEDICINE

## 2018-07-27 PROCEDURE — 84480 ASSAY TRIIODOTHYRONINE (T3): CPT | Performed by: INTERNAL MEDICINE

## 2018-07-27 PROCEDURE — 25000132 ZZH RX MED GY IP 250 OP 250 PS 637: Performed by: INTERNAL MEDICINE

## 2018-07-27 PROCEDURE — 40000141 ZZH STATISTIC PERIPHERAL IV START W/O US GUIDANCE

## 2018-07-27 PROCEDURE — 99233 SBSQ HOSP IP/OBS HIGH 50: CPT | Performed by: INTERNAL MEDICINE

## 2018-07-27 PROCEDURE — 80053 COMPREHEN METABOLIC PANEL: CPT | Performed by: INTERNAL MEDICINE

## 2018-07-27 PROCEDURE — 84481 FREE ASSAY (FT-3): CPT | Performed by: INTERNAL MEDICINE

## 2018-07-27 PROCEDURE — 85610 PROTHROMBIN TIME: CPT | Performed by: HOSPITALIST

## 2018-07-27 PROCEDURE — 25000132 ZZH RX MED GY IP 250 OP 250 PS 637

## 2018-07-27 PROCEDURE — 85520 HEPARIN ASSAY: CPT | Performed by: INTERNAL MEDICINE

## 2018-07-27 PROCEDURE — 25000128 H RX IP 250 OP 636: Performed by: INTERNAL MEDICINE

## 2018-07-27 PROCEDURE — 84443 ASSAY THYROID STIM HORMONE: CPT | Performed by: INTERNAL MEDICINE

## 2018-07-27 PROCEDURE — 85027 COMPLETE CBC AUTOMATED: CPT | Performed by: INTERNAL MEDICINE

## 2018-07-27 PROCEDURE — 93010 ELECTROCARDIOGRAM REPORT: CPT | Performed by: INTERNAL MEDICINE

## 2018-07-27 PROCEDURE — 21000001 ZZH R&B HEART CARE

## 2018-07-27 PROCEDURE — 82248 BILIRUBIN DIRECT: CPT | Performed by: INTERNAL MEDICINE

## 2018-07-27 PROCEDURE — 93005 ELECTROCARDIOGRAM TRACING: CPT

## 2018-07-27 RX ORDER — LISINOPRIL 5 MG/1
5 TABLET ORAL 2 TIMES DAILY
Status: DISCONTINUED | OUTPATIENT
Start: 2018-07-27 | End: 2018-07-30

## 2018-07-27 RX ORDER — METOPROLOL SUCCINATE 50 MG/1
50 TABLET, EXTENDED RELEASE ORAL 2 TIMES DAILY
Status: DISCONTINUED | OUTPATIENT
Start: 2018-07-27 | End: 2018-07-29

## 2018-07-27 RX ORDER — TORSEMIDE 20 MG/1
20 TABLET ORAL DAILY
Status: DISCONTINUED | OUTPATIENT
Start: 2018-07-28 | End: 2018-07-29

## 2018-07-27 RX ORDER — FUROSEMIDE 10 MG/ML
80 INJECTION INTRAMUSCULAR; INTRAVENOUS ONCE
Status: COMPLETED | OUTPATIENT
Start: 2018-07-27 | End: 2018-07-27

## 2018-07-27 RX ORDER — WARFARIN SODIUM 5 MG/1
5 TABLET ORAL
Status: COMPLETED | OUTPATIENT
Start: 2018-07-27 | End: 2018-07-27

## 2018-07-27 RX ORDER — METHIMAZOLE 5 MG/1
5 TABLET ORAL 3 TIMES DAILY
Status: DISCONTINUED | OUTPATIENT
Start: 2018-07-27 | End: 2018-08-03

## 2018-07-27 RX ORDER — POTASSIUM CHLORIDE 1500 MG/1
40 TABLET, EXTENDED RELEASE ORAL 2 TIMES DAILY
Status: DISCONTINUED | OUTPATIENT
Start: 2018-07-27 | End: 2018-07-31

## 2018-07-27 RX ADMIN — LISINOPRIL 5 MG: 5 TABLET ORAL at 21:25

## 2018-07-27 RX ADMIN — WARFARIN SODIUM 5 MG: 5 TABLET ORAL at 19:24

## 2018-07-27 RX ADMIN — METHIMAZOLE 5 MG: 5 TABLET ORAL at 17:12

## 2018-07-27 RX ADMIN — METOPROLOL SUCCINATE 50 MG: 50 TABLET, EXTENDED RELEASE ORAL at 17:12

## 2018-07-27 RX ADMIN — RANITIDINE 150 MG: 150 TABLET ORAL at 08:57

## 2018-07-27 RX ADMIN — METOPROLOL SUCCINATE 50 MG: 50 TABLET, EXTENDED RELEASE ORAL at 21:25

## 2018-07-27 RX ADMIN — FUROSEMIDE 8 MG/HR: 10 INJECTION, SOLUTION INTRAVENOUS at 10:50

## 2018-07-27 RX ADMIN — METHIMAZOLE 5 MG: 5 TABLET ORAL at 21:25

## 2018-07-27 RX ADMIN — POTASSIUM CHLORIDE 40 MEQ: 1500 TABLET, EXTENDED RELEASE ORAL at 08:58

## 2018-07-27 RX ADMIN — LISINOPRIL 5 MG: 5 TABLET ORAL at 08:57

## 2018-07-27 RX ADMIN — POTASSIUM CHLORIDE 40 MEQ: 1500 TABLET, EXTENDED RELEASE ORAL at 21:25

## 2018-07-27 RX ADMIN — RANITIDINE 150 MG: 150 TABLET ORAL at 21:25

## 2018-07-27 RX ADMIN — FUROSEMIDE 80 MG: 10 INJECTION, SOLUTION INTRAVENOUS at 17:12

## 2018-07-27 RX ADMIN — METOPROLOL SUCCINATE 25 MG: 25 TABLET, EXTENDED RELEASE ORAL at 08:59

## 2018-07-27 ASSESSMENT — ACTIVITIES OF DAILY LIVING (ADL)
ADLS_ACUITY_SCORE: 13
ADLS_ACUITY_SCORE: 9
ADLS_ACUITY_SCORE: 13
ADLS_ACUITY_SCORE: 13
ADLS_ACUITY_SCORE: 9
ADLS_ACUITY_SCORE: 9

## 2018-07-27 NOTE — CONSULTS
[facility] evaluation referral received from Dr. Dixon.   Patient is not currently established in the [facility].   Nutrition consult noted.   Hospital nurse, please give pt [facility]/HF education.   Dr. Dixon would like patient to specifically see MO Mcguire in [facility].    [facility] appointment made for:   8/13/18 in [place] at 7:40am for non-fasting labs and 8:10pm to see MO Mcguire    We look forward to seeing Christ in the clinic.   Please call with questions.     Flower Whalen RN  [facility] RN Care Coordinator  [facility]  [phone]    [facility]: Cardiomyopathy, Optimization, Rehabilitation, Education   The [facility] is a heart failure specialty clinic within the [facility] where you will work with your cardiologist, nurse practitioners, physician assistants and registered nurses who specialize in heart failure care. They are dedicated to helping patients with heart failure to carefully adjust medications, receive education, and learn who and when to call if symptoms develop. They specialize in helping you better understand your condition, slow the progression of your disease, improve the length and quality of your life, help you detect future heart problems before they become life threatening, and avoid hospitalizations.

## 2018-07-27 NOTE — CONSULTS
Care Transition Initial Assessment -   Reason For Consult: insurance concerns, financial concerns  Met with: Patient    Active Problems:    Atrial fibrillation (H)       DATA  Per care transitions consult for no insurance/discarge medication concerns.  Reviewed chart.  Patient has no insurance.  Call placed and message left for the financial office to follow up with patient directly regarding insurance concerns.  Referral  Made to the discharge pharmacy liaison for discharge medication assistance.    Identified issues/concerns regarding health management:      ASSESSMENT  Cognitive Status:  awake and alert  Concerns to be addressed:      PLAN  Financial costs for the patient includes patient has no insurance.    Patient given options and choices for discharge N/A.  Patient/family is agreeable to the plan?  Yes  Patient Goals and Preferences: return home  Patient anticipates discharging to:  Home.    Will continue to follow and assist with a safe discharge plan.    APPLE Lorenz, [facility]  [phone]

## 2018-07-27 NOTE — PLAN OF CARE
"Problem: Patient Care Overview  Goal: Plan of Care/Patient Progress Review    Patient is alert and oriented X 4. Vitals: /77 (BP Location: Left arm)  Pulse 109  Temp 98  F (36.7  C) (Oral)  Resp 18  Ht 1.626 m (5' 4\")  Wt 80 kg (176 lb 5.9 oz)  SpO2 93%  BMI 30.27 kg/m2 on RA. Up in room per self. Denies pain, but complains of SOB on and off. Audible wheezes at time. Tele is Afib with RVR. Diltiazem drip at 10. Lasix given per order -- good urine output -- clear yellow. LE edema is improving. Heparin drip infusing, rate changed per order and 10A will be rechecked per protocol. Orders in to transfer patient, report called in and patient transferred to the heart center. Patient's family is at bedside. Will cont to monitor.       "

## 2018-07-27 NOTE — PHARMACY-ANTICOAGULATION SERVICE
Clinical Pharmacy - Warfarin Dosing Consult     Pharmacy has been consulted to manage this patient s warfarin therapy.  Indication: Atrial Fibrillation  Therapy Goal: INR 2-3  Warfarin Prior to Admission: No  Recent documented change in oral intake/nutrition: No    INR   Date Value Ref Range Status   07/27/2018 1.02 0.86 - 1.14 Final       Recommend warfarin 5 mg today.  Pharmacy will monitor Winda Castanos daily and order warfarin doses to achieve specified goal.      Please contact pharmacy as soon as possible if the warfarin needs to be held for a procedure or if the warfarin goals change.

## 2018-07-27 NOTE — PLAN OF CARE
Problem: Patient Care Overview  Goal: Discharge Needs Assessment  Outcome: No Change  Alert and oriented. VSS on room air. Dilt gtt at at 10 mg/hr, Lasix gtt at 8 mg/hr, and heparin gtt at 550 units/hr. Trace edema to BLE. GRAYSON. Tele a fib with CVR. Up independently.

## 2018-07-27 NOTE — CONSULTS
REASON FOR ASSESSMENT:  CHF Consult for 2 gm NA Diet Education    NUTRITION HISTORY:    Information obtained from:  Patient    Living situation:   Home    Grocery shopping:  Patient and/or her significant other    Meal preparation:  Patient    Breakfast:  [place], eggs cooked in vegetable oil, white toast with butter    Lunch:  Rice and a meat or fish    Dinner:   Meat (beef, pork, chicken), potato, or spaghetti     Previous diet instructions:  None.    Pt reports that she uses a lot of added salt and MSG on all vegetables, meat, and pasta for flavor.       CURRENT DIET:  NPO (previously on 2g Na diet with 1800 mL FR)    NUTRITION DIAGNOSIS:  Food- and nutrition-related knowledge deficit R/t no previous low-sodium diet instructions received AEB pt report      INTERVENTIONS:    Nutrition Prescription:  2 g Na diet    Implementation:    Assessed learning needs, learning preferences, and willingness to learn    Nutrition Education (Content):  a) Provided handouts:  1) Tips for Low Na Diet  2) Label Reading  3) Low Na Foods/Drinks  4) Seasoning Your Food Without Salt  b) Discussed rational for limiting Na for CHF and stressed importance of following 2 gm Na guidelines   c) Encouraged patient to keep a daily food record    Nutrition Education (Application):  a) Discussed current eating habits and recommended alternative food choices    Anticipated fair compliance    Diet Education - refer to Education Flowsheet    Goals:    Patient verbalizes understanding of diet by asking appropriate questions    All of the above goals met during the education session    Follow Up:    Provided RD contact information for future questions.    Recommend Out-Patient Nutrition Referral, if further diet instructions are needed.        Cynthia Crain RD  Clinical Dietitian

## 2018-07-27 NOTE — PLAN OF CARE
Problem: Cardiac: Heart Failure (Adult)  Goal: Signs and Symptoms of Listed Potential Problems Will be Absent, Minimized or Managed (Cardiac: Heart Failure)  Signs and symptoms of listed potential problems will be absent, minimized or managed by discharge/transition of care (reference Cardiac: Heart Failure (Adult) CPG).  Outcome: No Change  Heart Failure Care Pathway  GOALS TO BE MET BEFORE DISCHARGE:    1. Decrease congestion and/or edema with diuretic therapy to achieve near      optimal volume status.            Dyspnea improved:  Yes            Edema improved:     Yes        Net I/O and Weights since admission:          06/27 1500 - 07/27 1459  In: -   Out: 4525 [Urine:4525]  Net: -4525            Vitals:    07/26/18 0331   Weight: 80 kg (176 lb 5.9 oz)       2.  O2 sats > 92% on RA or at prior home O2 therapy level.          Current oxygenation status:       SpO2: 95 %         O2 Device: None (Room air),            Able to wean O2 this shift to keep sats > 92%:  Yes       Does patient use Home O2? No    3.  Tolerates ambulation and mobility near baseline: Yes        How many times did the patient ambulate with nursing staff this shift? 5    Please review the Heart Failure Care Pathway for additional HF goal outcomes.    VSS. AOx4. Up independently, IV infusing with Heparin (550u/hr, recheck in AM), Diltiazem (5 mg/hr), Lasix (8 mg/hr), BP stable. Diuresing well, on 1800 mL fluid restriction. Tele A fib CVR with HR of 85. Hyperthyroidism (new dx): Nodule on Thyroid gland upon US and Thyroid labs checked and pt was started on Tapazole 5 mg Tid. Pt started on Toprol XL, Warfarin.    Note: Patient complains of intermittent cramping in legs, belly, and hands. She states this started today. I would suspect hypokalemia is the cause, scheduled K+ supplements given (80 mEq total). Note left for MD's.       Conchita Martinez RN  7/27/2018

## 2018-07-27 NOTE — PROGRESS NOTES
[facility]    Hospitalist Progress Note      Assessment & Plan   Christ Servin is a 58 year old female with a past medical history of GERD who was admitted on 7/26/2018 with shortness of breath and afib with RVR.    Atrial fibrillation with rapid ventricular rate: New diagnosis for the patient and associated with shortness of breath, see acute CHF exacerbation below.  Notably she does have evidence of hyperthyroidism which could be driving some of this.  She was initially given metoprolol and then started on diltiazem and a heparin drip.   -continue heparin  -no need for RANDY guided cardioversion today as rate is controlled and she is substantially improved  -continue diltiazem drip  -see below for discussion about hyperthyroidism    Acute congestive heart failure exacerbation: pt describes worsening shortness of breat with exertion, lying flat and also peripheral edema.  Noted that abd CT shows ascites which is likely from CHF.  Suspect this is related to the atrial fibrillation.  Echo shows preserved EF of 45-50% with moderate global hypokinesia, decreased RV function, dilated atria, dilated IVC, pulmonary hypertension and mod/severe TR with a small pericardial effusion.    -cardiology following  -continue lasix drip - pt feeling much better today  -rate control as above  -continue metoprolol     Acute kidney injury, stage II: Patient with a creatinine of 1.20 on admission, elevated from baseline of approximately 1.04.  - continue diuresis as above  - Rate control of heart rate.  - Avoid nephrotoxins.  - repeat BMP tomorrow     Abdominal ascites with slight bili elevation: Patient with CT Abd completed noting ascites. U/s ruq completed 7/23 showing prior cholecystectomy, and no evidence for fatty infiltration. ALT elevated on 7/1, however, improving on 7/26/18.  Wonder if this is all due to volume overload with CHF  - repeat LFTs tomorrow  - diuresis as above      Hyperthyroidism: Patient with a  TSH of 4.78, and a free T4 level of 1.65.  Repeat on 7/27 shows TSH of 3.5 and FT4 of 1.78.  Thyroid US shows a 7mm nodule in mid lateral left lobe.  - planning to start methimazole as patient is symptomatic with afib  - will need outpatient endocrine follow up to discuss further treatment and work up     GERD: Stable.  - Continue prior to admission ranitidine.     Pain Assessment:  Current Pain Score 7/27/2018   Patient currently in pain? denies   Pain score (0-10) -   Christ guajardo pain level was assessed and she currently denies pain.      DVT Prophylaxis: Heparin SQ  Code Status: Full Code    Disposition: Expected discharge in 2-3 days once rate controlled and diureses appropriately.  -discussed with Dr. Dixon of cardiology    Ralph Real    Interval History   Pt feels much better today after diuresis.  She denies any palpitations or chest pain.  She can now actually lie down flat without any shortness of breath which is a dramatic improvement.      -Data reviewed today: I reviewed all new labs and imaging results over the last 24 hours. I personally reviewed the tele image(s) showing afib with controlled rate.    Physical Exam   Temp: 98  F (36.7  C) Temp src: Oral BP: 120/75 Pulse: 91 Heart Rate: 87 Resp: 18 SpO2: 95 % O2 Device: None (Room air)    Vitals:    07/26/18 0331   Weight: 80 kg (176 lb 5.9 oz)     Vital Signs with Ranges  Temp:  [98  F (36.7  C)-98.7  F (37.1  C)] 98  F (36.7  C)  Pulse:  [] 91  Heart Rate:  [] 87  Resp:  [18-20] 18  BP: (101-139)/() 120/75  SpO2:  [92 %-95 %] 95 %  I/O last 3 completed shifts:  In: -   Out: 3675 [Urine:3675]    Constitutional: awake, alert, cooperative    Respiratory: Clear to auscultation bilaterally, no crackles or wheezing noted.  Good air exchange.     Cardiovascular: Irregular but controlled    GI: Positive bowel sounds, soft, non-distended, non-tender.  No masses or organomegaly noted.     Musculoskeletal: Full range of motion.  Tone is  normal.  No acute abnormalities.     Neurologic: Awake, alert and oriented to name, place and time.  Cranial nerves II-XII are grossly intact.      Lymphatic: No edema noted    Skin: no rash    Medications     diltiazem (CARDIZEM) infusion ADULT 5 mg/hr (07/27/18 0916)     furosemide 8 mg/hr (07/27/18 1050)     HEParin 550 Units/hr (07/27/18 0205)       lisinopril  5 mg Oral BID     metoprolol succinate  25 mg Oral Daily     potassium chloride  40 mEq Oral BID     ranitidine  150 mg Oral BID       Data     Recent Labs  Lab 07/27/18  0516 07/26/18  1308 07/26/18  0946 07/26/18  0538 07/23/18  1016   WBC 7.3  --   --  8.6 7.5   HGB 13.9  --   --  14.8 14.8   MCV 93  --   --  94 93     --   --  171 181     --   --  143 142   POTASSIUM 3.5  --   --  4.7 4.3   CHLORIDE 104  --   --  110* 111*   CO2 32  --   --  22 21   BUN 18  --   --  16 18   CR 1.33*  --   --  1.20* 1.04   ANIONGAP 8  --   --  11 10   ANKUSH 8.9  --   --  8.6 8.3*   *  --   --  116* 145*   ALBUMIN 3.9  --   --  3.6 3.6   PROTTOTAL 6.9  --   --  6.7* 7.3   BILITOTAL 1.8*  --   --  1.5* 1.8*   ALKPHOS 103  --   --  101 117   ALT 48  --   --  51* 64*   AST 31  --   --  41 41   LIPASE  --   --   --  139 142   TROPI  --  <0.015 <0.015 <0.015 <0.015       No results found for this or any previous visit (from the past 24 hour(s)).

## 2018-07-28 ENCOUNTER — APPOINTMENT (OUTPATIENT)
Dept: GENERAL RADIOLOGY | Facility: CLINIC | Age: 58
End: 2018-07-28
Attending: INTERNAL MEDICINE
Payer: MEDICAID

## 2018-07-28 LAB
ANION GAP SERPL CALCULATED.3IONS-SCNC: 11 MMOL/L (ref 3–14)
BUN SERPL-MCNC: 18 MG/DL (ref 7–30)
CALCIUM SERPL-MCNC: 9.3 MG/DL (ref 8.5–10.1)
CHLORIDE SERPL-SCNC: 94 MMOL/L (ref 94–109)
CO2 SERPL-SCNC: 34 MMOL/L (ref 20–32)
CREAT SERPL-MCNC: 1.28 MG/DL (ref 0.52–1.04)
GFR SERPL CREATININE-BSD FRML MDRD: 43 ML/MIN/1.7M2
GLUCOSE SERPL-MCNC: 104 MG/DL (ref 70–99)
INR PPP: 1.07 (ref 0.86–1.14)
INTERPRETATION ECG - MUSE: NORMAL
INTERPRETATION ECG - MUSE: NORMAL
LMWH PPP CHRO-ACNC: 0.17 IU/ML
LMWH PPP CHRO-ACNC: <0.1 IU/ML
MAGNESIUM SERPL-MCNC: 1.7 MG/DL (ref 1.6–2.3)
POTASSIUM SERPL-SCNC: 3.4 MMOL/L (ref 3.4–5.3)
SODIUM SERPL-SCNC: 139 MMOL/L (ref 133–144)

## 2018-07-28 PROCEDURE — 25000132 ZZH RX MED GY IP 250 OP 250 PS 637: Performed by: INTERNAL MEDICINE

## 2018-07-28 PROCEDURE — 25000125 ZZHC RX 250: Performed by: INTERNAL MEDICINE

## 2018-07-28 PROCEDURE — 85520 HEPARIN ASSAY: CPT | Performed by: HOSPITALIST

## 2018-07-28 PROCEDURE — 36415 COLL VENOUS BLD VENIPUNCTURE: CPT | Performed by: INTERNAL MEDICINE

## 2018-07-28 PROCEDURE — 25000132 ZZH RX MED GY IP 250 OP 250 PS 637

## 2018-07-28 PROCEDURE — 25000132 ZZH RX MED GY IP 250 OP 250 PS 637: Performed by: HOSPITALIST

## 2018-07-28 PROCEDURE — 85610 PROTHROMBIN TIME: CPT | Performed by: HOSPITALIST

## 2018-07-28 PROCEDURE — 21000001 ZZH R&B HEART CARE

## 2018-07-28 PROCEDURE — 85520 HEPARIN ASSAY: CPT | Performed by: INTERNAL MEDICINE

## 2018-07-28 PROCEDURE — 83735 ASSAY OF MAGNESIUM: CPT | Performed by: HOSPITALIST

## 2018-07-28 PROCEDURE — 99207 ZZC CDG-MDM COMPONENT: MEETS MODERATE - UP CODED: CPT | Performed by: INTERNAL MEDICINE

## 2018-07-28 PROCEDURE — 36415 COLL VENOUS BLD VENIPUNCTURE: CPT | Performed by: HOSPITALIST

## 2018-07-28 PROCEDURE — 99232 SBSQ HOSP IP/OBS MODERATE 35: CPT | Performed by: INTERNAL MEDICINE

## 2018-07-28 PROCEDURE — 99233 SBSQ HOSP IP/OBS HIGH 50: CPT | Performed by: INTERNAL MEDICINE

## 2018-07-28 PROCEDURE — 71046 X-RAY EXAM CHEST 2 VIEWS: CPT

## 2018-07-28 PROCEDURE — 25000128 H RX IP 250 OP 636: Performed by: HOSPITALIST

## 2018-07-28 PROCEDURE — 80048 BASIC METABOLIC PNL TOTAL CA: CPT | Performed by: HOSPITALIST

## 2018-07-28 RX ORDER — WARFARIN SODIUM 7.5 MG/1
7.5 TABLET ORAL
Status: COMPLETED | OUTPATIENT
Start: 2018-07-28 | End: 2018-07-28

## 2018-07-28 RX ORDER — POTASSIUM CHLORIDE 7.45 MG/ML
10 INJECTION INTRAVENOUS
Status: DISCONTINUED | OUTPATIENT
Start: 2018-07-28 | End: 2018-08-03 | Stop reason: HOSPADM

## 2018-07-28 RX ORDER — MAGNESIUM SULFATE HEPTAHYDRATE 40 MG/ML
4 INJECTION, SOLUTION INTRAVENOUS EVERY 4 HOURS PRN
Status: DISCONTINUED | OUTPATIENT
Start: 2018-07-28 | End: 2018-08-03 | Stop reason: HOSPADM

## 2018-07-28 RX ORDER — POTASSIUM CL/LIDO/0.9 % NACL 10MEQ/0.1L
10 INTRAVENOUS SOLUTION, PIGGYBACK (ML) INTRAVENOUS
Status: DISCONTINUED | OUTPATIENT
Start: 2018-07-28 | End: 2018-08-03 | Stop reason: HOSPADM

## 2018-07-28 RX ORDER — POTASSIUM CHLORIDE 29.8 MG/ML
20 INJECTION INTRAVENOUS
Status: DISCONTINUED | OUTPATIENT
Start: 2018-07-28 | End: 2018-08-03 | Stop reason: HOSPADM

## 2018-07-28 RX ORDER — POTASSIUM CHLORIDE 1500 MG/1
20-40 TABLET, EXTENDED RELEASE ORAL
Status: DISCONTINUED | OUTPATIENT
Start: 2018-07-28 | End: 2018-08-03 | Stop reason: HOSPADM

## 2018-07-28 RX ORDER — POTASSIUM CHLORIDE 1.5 G/1.58G
20-40 POWDER, FOR SOLUTION ORAL
Status: DISCONTINUED | OUTPATIENT
Start: 2018-07-28 | End: 2018-08-03 | Stop reason: HOSPADM

## 2018-07-28 RX ORDER — MAGNESIUM SULFATE HEPTAHYDRATE 40 MG/ML
2 INJECTION, SOLUTION INTRAVENOUS DAILY PRN
Status: DISCONTINUED | OUTPATIENT
Start: 2018-07-28 | End: 2018-08-03 | Stop reason: HOSPADM

## 2018-07-28 RX ADMIN — ACETAMINOPHEN 650 MG: 325 TABLET, FILM COATED ORAL at 05:56

## 2018-07-28 RX ADMIN — METOPROLOL SUCCINATE 50 MG: 50 TABLET, EXTENDED RELEASE ORAL at 20:42

## 2018-07-28 RX ADMIN — FUROSEMIDE 8 MG/HR: 10 INJECTION, SOLUTION INTRAVENOUS at 09:42

## 2018-07-28 RX ADMIN — ACETAMINOPHEN 650 MG: 325 TABLET, FILM COATED ORAL at 20:41

## 2018-07-28 RX ADMIN — METHIMAZOLE 5 MG: 5 TABLET ORAL at 20:42

## 2018-07-28 RX ADMIN — METHIMAZOLE 5 MG: 5 TABLET ORAL at 15:35

## 2018-07-28 RX ADMIN — POTASSIUM CHLORIDE 20 MEQ: 1500 TABLET, EXTENDED RELEASE ORAL at 15:46

## 2018-07-28 RX ADMIN — HEPARIN SODIUM 550 UNITS/HR: 10000 INJECTION, SOLUTION INTRAVENOUS at 00:16

## 2018-07-28 RX ADMIN — WARFARIN SODIUM 7.5 MG: 7.5 TABLET ORAL at 18:20

## 2018-07-28 RX ADMIN — RANITIDINE 150 MG: 150 TABLET ORAL at 20:42

## 2018-07-28 RX ADMIN — POTASSIUM CHLORIDE 40 MEQ: 1500 TABLET, EXTENDED RELEASE ORAL at 20:41

## 2018-07-28 RX ADMIN — ACETAMINOPHEN 650 MG: 325 TABLET, FILM COATED ORAL at 15:35

## 2018-07-28 RX ADMIN — RANITIDINE 150 MG: 150 TABLET ORAL at 09:28

## 2018-07-28 RX ADMIN — POTASSIUM CHLORIDE 40 MEQ: 1500 TABLET, EXTENDED RELEASE ORAL at 09:28

## 2018-07-28 RX ADMIN — LISINOPRIL 5 MG: 5 TABLET ORAL at 20:41

## 2018-07-28 RX ADMIN — METHIMAZOLE 5 MG: 5 TABLET ORAL at 09:29

## 2018-07-28 RX ADMIN — METOPROLOL SUCCINATE 50 MG: 50 TABLET, EXTENDED RELEASE ORAL at 09:29

## 2018-07-28 RX ADMIN — TORSEMIDE 20 MG: 20 TABLET ORAL at 09:29

## 2018-07-28 RX ADMIN — LISINOPRIL 5 MG: 5 TABLET ORAL at 09:28

## 2018-07-28 ASSESSMENT — ACTIVITIES OF DAILY LIVING (ADL)
ADLS_ACUITY_SCORE: 9

## 2018-07-28 ASSESSMENT — PAIN DESCRIPTION - DESCRIPTORS: DESCRIPTORS: CRAMPING

## 2018-07-28 NOTE — PROGRESS NOTES
[facility]    Hospitalist Progress Note    Date of Service (when I saw the patient): 07/28/2018    Assessment & Plan   Christ Servin is a 58 year old female with a past medical history of GERD who was admitted on 7/26/2018 with shortness of breath and afib with RVR.     Atrial fibrillation with rapid ventricular rate:   New diagnosis for the patient and associated with shortness of breath, see acute CHF exacerbation below.  Notably she does have evidence of hyperthyroidism which could be driving some of this.  She was initially given metoprolol and then started on diltiazem and a heparin drip.   -No need for RANDY guided cardioversion as rate is controlled and she is substantially improved.   -Continues on iv Heparin and initiated on Warfarin due to lack of insurance coverage. INR 1.07.   -Diltiazem drip transitioned to po Metoprolol-XL 25 mg and increased to 50 mg bid 7/28 for suboptimal HR control.   -See below for discussion about hyperthyroidism  -Correct electrolytes as needed. K a little on the lower side. Mag pending.      Acute congestive heart failure exacerbation:   pt describes worsening shortness of breat with exertion, lying flat and also peripheral edema.  Noted that abd CT shows ascites which is likely from CHF.  Suspect this is related to the atrial fibrillation.  Echo shows preserved EF of 45-50% with moderate global hypokinesia, decreased RV function, dilated atria, dilated IVC, pulmonary hypertension and mod/severe TR with a small pericardial effusion.    -cardiology following  -Treated with a lasix drip - pt now appears euvolemic and was started on Torsemide 20 mg po daily 7/28.   -rate control as above  -continue metoprolol      Acute kidney injury, stage II:   Patient with a creatinine of 1.20 on admission, elevated from baseline of approximately 1.04.  - continue diuresis as above.  - Stable.   - Rate control of heart rate.  - Avoid nephrotoxins.  - repeat BMP  tomorrow      Abdominal ascites with slight bili elevation:   Patient with CT Abd completed noting ascites. U/s ruq completed 7/23 showing prior cholecystectomy, and no evidence for fatty infiltration. ALT elevated on 7/1, however, improving on 7/26/18.  Wonder if this is all due to volume overload with CHF  - repeat LFTs improving.   - diuresis as above       Hyperthyroidism:   Patient with a TSH of 4.78, and a free T4 level of 1.65.  Repeat on 7/27 shows TSH of 3.5 and FT4 of 1.78.  Thyroid US shows a 7mm nodule in mid lateral left lobe.  - Started methimazole as patient is symptomatic with afib  - will need outpatient endocrine follow up to discuss further treatment and work up      GERD: Stable.  - Continue prior to admission ranitidine.    DVT Prophylaxis: Warfarin  Code Status: Full Code    # Pain Assessment:  Current Pain Score 7/28/2018   Patient currently in pain? yes   Pain score (0-10) 4   Pain location Abdomen   Pain descriptors Cramping   Winda s pain level was assessed and she currently denies pain.        Disposition: Metoprolol and diuretics adjusted by cards today. Will follow for improvement in HR's and stability on po diuretics. Follow K/Mag and replace as needed. Keep K >4 and Mag >2.       Alfonso Long       Interval History   Does c/o some ongoing cramping in her legs and arms that started last night. Noted with a K of 3.4 but still WNL. Mag pending. Was also started on Methimazole yesterday that can cause some similar effects. Will replace K/Mag and follow for improvement. Otherwise if feeling better.     -Data reviewed today: I reviewed all new labs and imaging results over the last 24 hours. I personally reviewed no images or EKG's today.    Physical Exam   Temp: 98.2  F (36.8  C) Temp src: Oral BP: 103/70 Pulse: 92 Heart Rate: 81 Resp: 16 SpO2: 91 % O2 Device: None (Room air)    Vitals:    07/26/18 0331 07/27/18 1556 07/28/18 0500   Weight: 80 kg (176 lb 5.9 oz) 71.7 kg (158 lb  1.6 oz) 69.1 kg (152 lb 6.4 oz)     Vital Signs with Ranges  Temp:  [97.9  F (36.6  C)-98.7  F (37.1  C)] 98.2  F (36.8  C)  Pulse:  [92] 92  Heart Rate:  [81-91] 81  Resp:  [16-18] 16  BP: (103-120)/(59-76) 103/70  SpO2:  [91 %-95 %] 91 %  I/O last 3 completed shifts:  In: 100 [P.O.:100]  Out: 3950 [Urine:3950]    Gen: Patient in no acute distress.  Appears comfortable.  Heart:  S1S2+, regular rate, irregularly irregular, No murmurs.  Lungs:  Clear to auscultation at apices, no wheezing, no rales. decreased at bases.    Abdomen:  Soft, non tender, non distended, bowel sounds positive.  Extremities:  Trace edema.    Medications     furosemide 8 mg/hr (07/28/18 0942)     HEParin 700 Units/hr (07/28/18 0941)     Warfarin Therapy Reminder         lisinopril  5 mg Oral BID     methimazole  5 mg Oral TID     metoprolol succinate  50 mg Oral BID     potassium chloride  40 mEq Oral BID     ranitidine  150 mg Oral BID     torsemide  20 mg Oral Daily     warfarin  7.5 mg Oral ONCE at 18:00       Data     Recent Labs  Lab 07/28/18  0540 07/27/18  1640 07/27/18  0516 07/26/18  1308 07/26/18  0946 07/26/18  0538 07/23/18  1016   WBC  --   --  7.3  --   --  8.6 7.5   HGB  --   --  13.9  --   --  14.8 14.8   MCV  --   --  93  --   --  94 93   PLT  --   --  161  --   --  171 181   INR 1.07 1.02  --   --   --   --   --      --  144  --   --  143 142   POTASSIUM 3.4  --  3.5  --   --  4.7 4.3   CHLORIDE 94  --  104  --   --  110* 111*   CO2 34*  --  32  --   --  22 21   BUN 18  --  18  --   --  16 18   CR 1.28*  --  1.33*  --   --  1.20* 1.04   ANIONGAP 11  --  8  --   --  11 10   ANKUSH 9.3  --  8.9  --   --  8.6 8.3*   *  --  116*  --   --  116* 145*   ALBUMIN  --   --  3.9  --   --  3.6 3.6   PROTTOTAL  --   --  6.9  --   --  6.7* 7.3   BILITOTAL  --   --  1.8*  --   --  1.5* 1.8*   ALKPHOS  --   --  103  --   --  101 117   ALT  --   --  48  --   --  51* 64*   AST  --   --  31  --   --  41 41   LIPASE  --   --   --   --    --  139 142   TROPI  --   --   --  <0.015 <0.015 <0.015 <0.015       Recent Results (from the past 24 hour(s))   XR Chest 2 Views    Narrative    CHEST TWO VIEWS  7/28/2018 8:35 AM     HISTORY:  Pulmonary edema. Heart failure.    COMPARISON: 7/26/2018.      Impression    IMPRESSION: Small bilateral pleural effusions, larger on the right,  have improved slightly. Mild associated infiltrate or atelectasis at  the right lung base has also improved slightly. Cardiac enlargement is  again noted. Pulmonary vascularity is within normal limits. No  pneumothorax.

## 2018-07-28 NOTE — PROGRESS NOTES
[facility]    Cardiology Progress Note     Assessment & Plan   Christ Servin is a 58 year old female who was admitted on 7/26/2018.  Cardiology was consulted for recommendations regarding atrial fibrillation and congestive heart failure.    Active Problems:    Atrial fibrillation (H)    Assessment: I do think she needs a bit more heart rate control.    Plan: We will try her on metoprolol succinate 50 mg twice a day dosing today.  If this continues to be insufficient for heart rate control then we will plan to increase to 75 mg twice a day by this evening's dose.       Congestive heart failure     Assessment: She is now euvolemic     Plan: We will try to maintain a relatively euvolemic state on torsemide 20 mg daily today.  She is now having muscle cramps and so I do not think we need to push her too much further.  If more than -1 L consider given a bit fluid back this afternoon and reduce the torsemide dose to 10 mg daily starting tomorrow.    Thong Alfonso MD    Interval History   Mrs. Servin is doing well today.  Her shortness of breath is significantly improved.  She does not have any sensations of palpitations.  Her diltiazem drip has been stopped and her heart rates remain in the 80s to a maximum of 120 on the metoprolol succinate 50 mg daily.    Physical Exam   Temp: 98.2  F (36.8  C) Temp src: Oral BP: 103/70 Pulse: 92 Heart Rate: 81 Resp: 16 SpO2: 91 % O2 Device: None (Room air)    Vitals:    07/26/18 0331 07/27/18 1556 07/28/18 0500   Weight: 80 kg (176 lb 5.9 oz) 71.7 kg (158 lb 1.6 oz) 69.1 kg (152 lb 6.4 oz)     Vital Signs with Ranges  Temp:  [97.9  F (36.6  C)-98.7  F (37.1  C)] 98.2  F (36.8  C)  Pulse:  [92] 92  Heart Rate:  [81-91] 81  Resp:  [16-18] 16  BP: (103-120)/(59-76) 103/70  SpO2:  [91 %-95 %] 91 %  I/O last 3 completed shifts:  In: 100 [P.O.:100]  Out: 3950 [Urine:3950]    Constitutional: No apparent distress.   Eyes: No xanthelasma or conjunctivitis  Respiratory:  Clear to auscultation bilaterally. No crackles or wheezes.  Cardiovascular: Irregularly-regular rhythm with a normal rate. Normal S1 and S2. No murmurs. No extra heart sounds. No JVD.   Extremities: No peripheral edema.  Neurologic: Moving all extremities. No facial assymmetry.  Psychiatric: Alert and oriented. Answers questions appropriately.     Medications     furosemide 8 mg/hr (07/28/18 0000)     HEParin 700 Units/hr (07/28/18 0632)     Warfarin Therapy Reminder         lisinopril  5 mg Oral BID     methimazole  5 mg Oral TID     metoprolol succinate  50 mg Oral BID     potassium chloride  40 mEq Oral BID     ranitidine  150 mg Oral BID     torsemide  20 mg Oral Daily       Data   Results for orders placed or performed during the hospital encounter of 07/26/18 (from the past 24 hour(s))   Heparin Xa level (AM Draw)   Result Value Ref Range    Heparin 10A Level 0.17 IU/mL   Nutrition Services Adult IP Consult    Narrative    Cynthia Crain     7/27/2018 12:03 PM  REASON FOR ASSESSMENT:  CHF Consult for 2 gm NA Diet Education    NUTRITION HISTORY:    Information obtained from:  Patient    Living situation:   Home    Grocery shopping:  Patient and/or her significant other    Meal preparation:  Patient    Breakfast:  [place], eggs cooked in vegetable oil, white toast with butter    Lunch:  Rice and a meat or fish    Dinner:   Meat (beef, pork, chicken), potato, or spaghetti     Previous diet instructions:  None.    Pt reports that she uses a lot of added salt and MSG on all   vegetables, meat, and pasta for flavor.       CURRENT DIET:  NPO (previously on 2g Na diet with 1800 mL FR)    NUTRITION DIAGNOSIS:  Food- and nutrition-related knowledge deficit R/t no previous   low-sodium diet instructions received AEB pt report      INTERVENTIONS:    Nutrition Prescription:  2 g Na diet    Implementation:  ? Assessed learning needs, learning preferences, and willingness   to learn  ? Nutrition Education (Content):  a)  Provided handouts:  1) Tips for Low Na Diet  2) Label Reading  3) Low Na Foods/Drinks  4) Seasoning Your Food Without Salt  b) Discussed rational for limiting Na for CHF and stressed   importance of following 2 gm Na guidelines   c) Encouraged patient to keep a daily food record  ? Nutrition Education (Application):  a) Discussed current eating habits and recommended alternative   food choices  ? Anticipated fair compliance  ? Diet Education - refer to Education Flowsheet    Goals:  ? Patient verbalizes understanding of diet by asking appropriate   questions  ? All of the above goals met during the education session    Follow Up:  ? Provided RD contact information for future questions.  ? Recommend Out-Patient Nutrition Referral, if further diet   instructions are needed.        Cynthia Crain, KESHA  Clinical Dietitian      Care Transition RN/SW IP Consult    Narrative    Sunitha Almeida LICSW     7/27/2018 11:28 AM  Care Transition Initial Assessment - SW  Reason For Consult: insurance concerns, financial concerns  Met with: Patient    Active Problems:    Atrial fibrillation (H)       DATA  Per care transitions consult for no insurance/discarge medication   concerns.  Reviewed chart.  Patient has no insurance.  Call   placed and message left for the financial office to follow up   with patient directly regarding insurance concerns.  Referral    Made to the discharge pharmacy liaison for discharge medication   assistance.    Identified issues/concerns regarding health management:      ASSESSMENT  Cognitive Status:  awake and alert  Concerns to be addressed:      PLAN  Financial costs for the patient includes patient has no   insurance.    Patient given options and choices for discharge N/A.  Patient/family is agreeable to the plan?  Yes  Patient Goals and Preferences: return home  Patient anticipates discharging to:  Home.    Will continue to follow and assist with a safe discharge plan.    APPLE Lorenz,  [facility]  [phone]         T3 Free   Result Value Ref Range    Free T3 2.2 (L) 2.3 - 4.2 pg/mL   T3 total   Result Value Ref Range    Triiodothyronine (T3) 82 60 - 181 ng/dL   INR   Result Value Ref Range    INR 1.02 0.86 - 1.14   Heparin Xa level (AM Draw)   Result Value Ref Range    Heparin 10A Level <0.10 IU/mL   Basic metabolic panel   Result Value Ref Range    Sodium 139 133 - 144 mmol/L    Potassium 3.4 3.4 - 5.3 mmol/L    Chloride 94 94 - 109 mmol/L    Carbon Dioxide 34 (H) 20 - 32 mmol/L    Anion Gap 11 3 - 14 mmol/L    Glucose 104 (H) 70 - 99 mg/dL    Urea Nitrogen 18 7 - 30 mg/dL    Creatinine 1.28 (H) 0.52 - 1.04 mg/dL    GFR Estimate 43 (L) >60 mL/min/1.7m2    GFR Estimate If Black 52 (L) >60 mL/min/1.7m2    Calcium 9.3 8.5 - 10.1 mg/dL   INR   Result Value Ref Range    INR 1.07 0.86 - 1.14

## 2018-07-28 NOTE — CONSULTS
Care Transition Initial Assessment - RN    Reason For Consult: insurance concerns, financial concerns   Met with: Patient and significant other Domo.  DATA   Active Problems:    Atrial fibrillation (H)       Cognitive Status: awake, alert and oriented.        Contact information and PCP information verified: No  Lives With: spouse    Insurance concerns: EMA only  ASSESSMENT  Patient currently receives the following services:  none        Identified issues/concerns regarding health management: Pt qualifies for EMA only limiting the use of NOACs. The pt was started on warfarin and will be here until her INR is therapeutic.  The pt does not currently have a PCP but [facility] is most convenient.  Appointments as below.       PLAN  Financial costs for the patient include copays, and medications.  Patient given options and choices for discharge : yes .  Patient/family is agreeable to the plan?  Yes:   Patient anticipates discharging to home with INR Clinic referral.        Patient anticipates needs for home equipment: No  Plan/Disposition: Home   Appointments:     Aug 02, 2018  3:30 PM CDT   [facility] with [facility]   [facility]    [address] [place] [address] [id number] [phone]               Aug 02, 2018  4:00 PM CDT   Office Visit with Sona Patiño PA-C   [facility]    [address] [place] [address] [id number] [phone]                Care  (CTS) will continue to follow as needed.    Sunitha Salcido RN, BAN   Care Coordinator  Ph. [phone]

## 2018-07-28 NOTE — PLAN OF CARE
Problem: Cardiac: Heart Failure (Adult)  Goal: Signs and Symptoms of Listed Potential Problems Will be Absent, Minimized or Managed (Cardiac: Heart Failure)  Signs and symptoms of listed potential problems will be absent, minimized or managed by discharge/transition of care (reference Cardiac: Heart Failure (Adult) CPG).   Heart Failure Care Pathway  GOALS TO BE MET BEFORE DISCHARGE:    1. Decrease congestion and/or edema with diuretic therapy to achieve near      optimal volume status.            Dyspnea improved:  Yes            Edema improved:     Yes        Net I/O and Weights since admission:          06/28 0700 - 07/28 0659  In: 100 [P.O.:100]  Out: 7625 [Urine:7625]  Net: -7525            Vitals:    07/26/18 0331 07/27/18 1556 07/28/18 0500   Weight: 80 kg (176 lb 5.9 oz) 71.7 kg (158 lb 1.6 oz) 69.1 kg (152 lb 6.4 oz)       2.  O2 sats > 92% on RA or at prior home O2 therapy level.          Current oxygenation status:       SpO2: 94 %         O2 Device: None (Room air),            Able to wean O2 this shift to keep sats > 92%:  Yes       Does patient use Home O2? No    3.  Tolerates ambulation and mobility near baseline: Yes        How many times did the patient ambulate with nursing staff this shift? 1    Please review the Heart Failure Care Pathway for additional HF goal outcomes.    Pt A&Ox4, VSS on RA. Tele a-fib CVR. Pt denies CP, dizziness, and SOB. Pt c/o cramping in her abd, back, and hands, pt's K+ was 3.4 w/ pt currently on low replacement protocol. Pt received PRN tylenol for cramps. Pt only had 100 for intake overnight. Pt had 1900 output. Pt currently on lasix gtt, 8mg/hr, and hep gtt which was increased to 700units/hr. Will continue to monitor.    Christian Parker RN  7/28/2018

## 2018-07-28 NOTE — PROGRESS NOTES
[facility].  Inpatient cardiology progress note.  Date of service: 7/27/2018.    INTERVAL HISTORY:  Marked symptomatic improvement with resolution of orthopnea, PND and reduced lower extremity edema.  She states her appetite is vastly improved.  She has had a significant diuretic response although I doubt that the charted weights are correct (apparently there is an 18 pound weight loss in the last 18 hours!!).  She continues on IV furosemide drip with intermittent bolus doses.  Atrial fibrillation is adequately rate controlled (70-80 bpm) on IV diltiazem and oral metoprolol.    On exam - able to lay flat with one pillow which she was not able to do yesterday, JVP difficult to assess due to short thick neck, she still has bibasal rales up to the lower thirds of the lungs but these are vastly improved from yesterday, heart sounds irregularly irregular (atrial fibrillation), 70-80 bpm, no audible murmur.    Labs -sodium 144, potassium 3.5, BUN 18, creatinine 1.3 with an estimated GFR of 41, total bilirubin 1.8, alkaline phosphatase 103, ALT 48, AST 31, TSH 3.5 but free T4 is elevated at 1.78, hemoglobin 13.9.    Transthoracic echocardiogram done yesterday when she was in rapid atrial fibrillation- normal left ventricular size, moderately reduced systolic function with global hypokinesis, visually estimated LVEF 45 and 50%, moderately dilated right ventricle with moderately reduced systolic function.  Moderately dilated left atrium, severely dilated right atrium, small left to right shunt, severe mitral regurgitation (probably functional), moderate to severe tricuspid regurgitation, dilated IVC.    DIAGNOSES/ASSESSMENT/PLAN:  1.  New diagnosis of acute decompensated biventricular systolic heart failure.  She has had a robust diuretic response to IV furosemide with a remarkable symptomatic improvement.  Echo suggests moderate biventricular systolic dysfunction.  There is global hypokinesis, so the  etiology is likely tachycardia mediated cardiomyopathy although she has had suboptimal medical follow-up, so coronary disease should be ruled out as part of her workup (can be pursued as an outpatient).  - Additional bolus of IV furosemide 80 mg.  Stop IV Lasix infusion.  Consider switching to oral torsemide 20 mg tomorrow.  - Continue lisinopril 5 mg twice daily (uptitrate as tolerated and metoprolol succinate.  -Coronary angiogram can be deferred to outpatient, after her heart failure and atrial fibrillation adequately treated.    2.  New diagnosis of atrial fibrillation with rapid ventricular rates.  - Improved rate control with IV diltiazem drip.  Stop drip.  Increase metoprolol XL to 50 mg twice daily.  - Start warfarin anticoagulation as patient does not have insurance coverage, therefore newer anticoagulants may not be cost effective for her.  - Elevated free T4 (hyperthyroidism) may be contributing.    3.  New diagnosis of hyperthyroidism.  Somewhat surprising as her TSH is normal but free T4 is elevated (not on thyroxine supplements).  Dr. Ralph Real is starting her on methimazole with a plan for outpatient endocrinology consult.    4.  Question of left to right shunt on echocardiogram.  She has right ventricular dilatation and systolic dysfunction, right atrium is enlarged more than the left, and there is a suggestion of left-to-right shunt.  This needs to be investigated with a transesophageal echocardiogram (can be done on Monday).    5.  Severe mitral and tricuspid regurgitation.  I suspect these are both functional because she was in florid heart failure with rapid atrial fibrillation and the echocardiogram was done.  Suggest repeat evaluation downstream.      6. Clinical suspicion of sleep disordered breathing.  Outpatient sleep clinic referral.    6.  Hypokalemia of 3.5.  Likely due to large volume diuresis.  Scheduled potassium supplementation.    Cardiology will follow.  Thank you for consulting  .    Arlet Dixon MD [facility]  Cardiology.

## 2018-07-29 LAB
ANION GAP SERPL CALCULATED.3IONS-SCNC: 8 MMOL/L (ref 3–14)
BUN SERPL-MCNC: 25 MG/DL (ref 7–30)
CALCIUM SERPL-MCNC: 8.6 MG/DL (ref 8.5–10.1)
CHLORIDE SERPL-SCNC: 99 MMOL/L (ref 94–109)
CO2 SERPL-SCNC: 30 MMOL/L (ref 20–32)
CREAT SERPL-MCNC: 1.4 MG/DL (ref 0.52–1.04)
GFR SERPL CREATININE-BSD FRML MDRD: 39 ML/MIN/1.7M2
GLUCOSE SERPL-MCNC: 112 MG/DL (ref 70–99)
INR PPP: 1.1 (ref 0.86–1.14)
LMWH PPP CHRO-ACNC: 0.34 IU/ML
POTASSIUM SERPL-SCNC: 4.6 MMOL/L (ref 3.4–5.3)
SODIUM SERPL-SCNC: 137 MMOL/L (ref 133–144)

## 2018-07-29 PROCEDURE — 93010 ELECTROCARDIOGRAM REPORT: CPT | Performed by: INTERNAL MEDICINE

## 2018-07-29 PROCEDURE — 99207 ZZC CDG-MDM COMPONENT: MEETS MODERATE - UP CODED: CPT | Performed by: INTERNAL MEDICINE

## 2018-07-29 PROCEDURE — 25000132 ZZH RX MED GY IP 250 OP 250 PS 637: Performed by: INTERNAL MEDICINE

## 2018-07-29 PROCEDURE — 25000128 H RX IP 250 OP 636: Performed by: INTERNAL MEDICINE

## 2018-07-29 PROCEDURE — 85610 PROTHROMBIN TIME: CPT | Performed by: INTERNAL MEDICINE

## 2018-07-29 PROCEDURE — 21000001 ZZH R&B HEART CARE

## 2018-07-29 PROCEDURE — 36415 COLL VENOUS BLD VENIPUNCTURE: CPT | Performed by: INTERNAL MEDICINE

## 2018-07-29 PROCEDURE — 99233 SBSQ HOSP IP/OBS HIGH 50: CPT | Performed by: INTERNAL MEDICINE

## 2018-07-29 PROCEDURE — 99232 SBSQ HOSP IP/OBS MODERATE 35: CPT | Performed by: INTERNAL MEDICINE

## 2018-07-29 PROCEDURE — 25000132 ZZH RX MED GY IP 250 OP 250 PS 637

## 2018-07-29 PROCEDURE — 85520 HEPARIN ASSAY: CPT | Performed by: INTERNAL MEDICINE

## 2018-07-29 PROCEDURE — 80048 BASIC METABOLIC PNL TOTAL CA: CPT | Performed by: INTERNAL MEDICINE

## 2018-07-29 PROCEDURE — 25000132 ZZH RX MED GY IP 250 OP 250 PS 637: Performed by: HOSPITALIST

## 2018-07-29 PROCEDURE — 93005 ELECTROCARDIOGRAM TRACING: CPT

## 2018-07-29 PROCEDURE — 25000125 ZZHC RX 250: Performed by: INTERNAL MEDICINE

## 2018-07-29 PROCEDURE — 25000128 H RX IP 250 OP 636

## 2018-07-29 RX ORDER — POTASSIUM CHLORIDE 1500 MG/1
20 TABLET, EXTENDED RELEASE ORAL
Status: DISCONTINUED | OUTPATIENT
Start: 2018-07-29 | End: 2018-07-30 | Stop reason: HOSPADM

## 2018-07-29 RX ORDER — MAGNESIUM SULFATE HEPTAHYDRATE 40 MG/ML
2 INJECTION, SOLUTION INTRAVENOUS
Status: DISCONTINUED | OUTPATIENT
Start: 2018-07-29 | End: 2018-07-30 | Stop reason: HOSPADM

## 2018-07-29 RX ORDER — WARFARIN SODIUM 7.5 MG/1
7.5 TABLET ORAL
Status: COMPLETED | OUTPATIENT
Start: 2018-07-29 | End: 2018-07-29

## 2018-07-29 RX ORDER — TORSEMIDE 10 MG/1
10 TABLET ORAL DAILY
Status: DISCONTINUED | OUTPATIENT
Start: 2018-07-29 | End: 2018-07-31

## 2018-07-29 RX ORDER — POTASSIUM CHLORIDE 1500 MG/1
40 TABLET, EXTENDED RELEASE ORAL
Status: DISCONTINUED | OUTPATIENT
Start: 2018-07-29 | End: 2018-07-30 | Stop reason: HOSPADM

## 2018-07-29 RX ADMIN — RANITIDINE 150 MG: 150 TABLET ORAL at 08:31

## 2018-07-29 RX ADMIN — METOPROLOL SUCCINATE 50 MG: 50 TABLET, EXTENDED RELEASE ORAL at 08:32

## 2018-07-29 RX ADMIN — WARFARIN SODIUM 7.5 MG: 7.5 TABLET ORAL at 18:08

## 2018-07-29 RX ADMIN — LISINOPRIL 5 MG: 5 TABLET ORAL at 08:31

## 2018-07-29 RX ADMIN — METHIMAZOLE 5 MG: 5 TABLET ORAL at 08:30

## 2018-07-29 RX ADMIN — METOPROLOL SUCCINATE 75 MG: 50 TABLET, EXTENDED RELEASE ORAL at 20:40

## 2018-07-29 RX ADMIN — METHIMAZOLE 5 MG: 5 TABLET ORAL at 16:19

## 2018-07-29 RX ADMIN — HEPARIN SODIUM 700 UNITS/HR: 10000 INJECTION, SOLUTION INTRAVENOUS at 16:05

## 2018-07-29 RX ADMIN — DILTIAZEM HYDROCHLORIDE 5 MG/HR: 5 INJECTION INTRAVENOUS at 11:22

## 2018-07-29 RX ADMIN — POTASSIUM CHLORIDE 40 MEQ: 1500 TABLET, EXTENDED RELEASE ORAL at 20:41

## 2018-07-29 RX ADMIN — RANITIDINE 150 MG: 150 TABLET ORAL at 20:40

## 2018-07-29 RX ADMIN — LISINOPRIL 5 MG: 5 TABLET ORAL at 20:40

## 2018-07-29 RX ADMIN — POTASSIUM CHLORIDE 40 MEQ: 1500 TABLET, EXTENDED RELEASE ORAL at 08:31

## 2018-07-29 RX ADMIN — TORSEMIDE 10 MG: 10 TABLET ORAL at 08:31

## 2018-07-29 RX ADMIN — METHIMAZOLE 5 MG: 5 TABLET ORAL at 20:39

## 2018-07-29 ASSESSMENT — ACTIVITIES OF DAILY LIVING (ADL)
ADLS_ACUITY_SCORE: 9

## 2018-07-29 NOTE — PLAN OF CARE
Problem: Cardiac: Heart Failure (Adult)  Goal: Signs and Symptoms of Listed Potential Problems Will be Absent, Minimized or Managed (Cardiac: Heart Failure)  Signs and symptoms of listed potential problems will be absent, minimized or managed by discharge/transition of care (reference Cardiac: Heart Failure (Adult) CPG).   Outcome: No Change  Pt alert, oriented and able to initiate needs appropriately.  Pt denies discomfort but did received tylenol prn to prevent discomfort.  Pt in afib with CVR and vitals remain stable.  On room air, lungs sound clear and pt denies shortness of breath.  No issues noted at this time.  Pt voiding multiple times but no measurement taken.  Will continue to monitor and update with changes if necessary.

## 2018-07-29 NOTE — PROVIDER NOTIFICATION
Dr. Alfonso notified of pt C/O epigastric pain  EKG obtained per order  BP soft and Diltiazem drip turned down to 2.5mg   HR 's  Pain resolved within 15 minutes

## 2018-07-29 NOTE — PROGRESS NOTES
[facility]    Hospitalist Progress Note    Date of Service (when I saw the patient): 07/29/2018    Assessment & Plan   Christ Servin is a 58 year old female with a past medical history of GERD who was admitted on 7/26/2018 with shortness of breath and afib with RVR.     Atrial fibrillation with rapid ventricular rate:   New diagnosis for the patient and associated with shortness of breath, see acute CHF exacerbation below.  Notably she does have evidence of hyperthyroidism which could be driving some of this.  She was initially given metoprolol and then started on diltiazem and a heparin drip.   - Diltiazem drip transitioned to po Metoprolol-XL 25 mg and increased to 50 mg bid 7/28 for suboptimal HR control.   - HR' still not well controlled () today.  - Plan is for a RANDY with cardioversion 7/30.    - Continues on iv Heparin and initiated on Warfarin due to lack of insurance coverage. INR 1.07.   - See below for discussion about hyperthyroidism  - Correct electrolytes as needed.     Acute congestive heart failure exacerbation:   pt describes worsening shortness of breat with exertion, lying flat and also peripheral edema.  Noted that abd CT shows ascites which is likely from CHF.  Suspect this is related to the atrial fibrillation.  Echo shows preserved EF of 45-50% with moderate global hypokinesia, decreased RV function, dilated atria, dilated IVC, pulmonary hypertension and mod/severe TR with a small pericardial effusion.    -cardiology following  -Treated with a lasix drip - pt now appears euvolemic and was started on Torsemide 20 mg po daily 7/28.   -Cr Slightly up today and her UO was > 3.5 L yesterday.  Torsemide reduced to 10 mg daily 7/19.   -rate control as above  -continue metoprolol      Acute kidney injury, stage II:   Patient with a creatinine of 1.20 on admission, elevated from baseline of approximately 1.04.  - Cr 1.4 today.   - Diuretics adjusted as above.  - Follow bmp in am.    - Avoid nephrotoxins.      Abdominal ascites with slight bili elevation:   Patient with CT Abd completed noting ascites. U/s ruq completed 7/23 showing prior cholecystectomy, and no evidence for fatty infiltration. ALT elevated on 7/1, however, improving on 7/26/18.  Wonder if this is all due to volume overload with CHF  - repeat LFTs improving.   - diuresis as above       Hyperthyroidism:   Patient with a TSH of 4.78, and a free T4 level of 1.65.  Repeat on 7/27 shows TSH of 3.5 and FT4 of 1.78.  Thyroid US shows a 7mm nodule in mid lateral left lobe.  - Started methimazole as patient is symptomatic with afib  - will need outpatient endocrine follow up to discuss further treatment and work up      GERD: Stable.  - Continue prior to admission ranitidine.    DVT Prophylaxis: Warfarin  Code Status: Full Code    # Pain Assessment:  Current Pain Score 7/29/2018   Patient currently in pain? denies   Pain score (0-10) -   Pain location -   Pain descriptors -   Christ guajardo pain level was assessed and she currently denies pain.        Disposition: Will keep her NPO after MN for RANDY with cardioversion tomorrow.  Follow BMP in am.       Alfonso Long       Interval History   HR's still not well controlled (ranging from ) over last 24 hours. Denies any CP, Palp, lightheadedness. Cramps improved. Wanting to take a shower but will hold off for now given her elevated HR's.     -Data reviewed today: I reviewed all new labs and imaging results over the last 24 hours. I personally reviewed no images or EKG's today.    Physical Exam   Temp: 97.5  F (36.4  C) Temp src: Oral BP: 110/60 Pulse: 93 Heart Rate: 152 Resp: 16 SpO2: 91 % O2 Device: None (Room air)    Vitals:    07/27/18 1556 07/28/18 0500 07/29/18 0500   Weight: 71.7 kg (158 lb 1.6 oz) 69.1 kg (152 lb 6.4 oz) 69.3 kg (152 lb 11.2 oz)     Vital Signs with Ranges  Temp:  [97.1  F (36.2  C)-97.9  F (36.6  C)] 97.5  F (36.4  C)  Pulse:  [72-93] 93  Heart Rate:   [] 152  Resp:  [16] 16  BP: ()/(52-65) 110/60  SpO2:  [91 %-95 %] 91 %  I/O last 3 completed shifts:  In: 240 [P.O.:240]  Out: -     Gen: Patient in no acute distress.  Appears comfortable.  Heart:  S1S2+, regular rate, irregularly irregular, No murmurs.  Lungs:  Clear to auscultation at apices, no wheezing, no rales. decreased at bases.    Abdomen:  Soft, non tender, non distended, bowel sounds positive.  Extremities:  No edema.    Medications     HEParin 700 Units/hr (07/28/18 0941)     Warfarin Therapy Reminder         lisinopril  5 mg Oral BID     methimazole  5 mg Oral TID     metoprolol succinate  75 mg Oral BID     potassium chloride  40 mEq Oral BID     ranitidine  150 mg Oral BID     torsemide  10 mg Oral Daily     warfarin  7.5 mg Oral ONCE at 18:00       Data     Recent Labs  Lab 07/29/18  0544 07/28/18  0540 07/27/18  1640 07/27/18  0516 07/26/18  1308 07/26/18  0946 07/26/18  0538 07/23/18  1016   WBC  --   --   --  7.3  --   --  8.6 7.5   HGB  --   --   --  13.9  --   --  14.8 14.8   MCV  --   --   --  93  --   --  94 93   PLT  --   --   --  161  --   --  171 181   INR 1.10 1.07 1.02  --   --   --   --   --     139  --  144  --   --  143 142   POTASSIUM 4.6 3.4  --  3.5  --   --  4.7 4.3   CHLORIDE 99 94  --  104  --   --  110* 111*   CO2 30 34*  --  32  --   --  22 21   BUN 25 18  --  18  --   --  16 18   CR 1.40* 1.28*  --  1.33*  --   --  1.20* 1.04   ANIONGAP 8 11  --  8  --   --  11 10   ANKUSH 8.6 9.3  --  8.9  --   --  8.6 8.3*   * 104*  --  116*  --   --  116* 145*   ALBUMIN  --   --   --  3.9  --   --  3.6 3.6   PROTTOTAL  --   --   --  6.9  --   --  6.7* 7.3   BILITOTAL  --   --   --  1.8*  --   --  1.5* 1.8*   ALKPHOS  --   --   --  103  --   --  101 117   ALT  --   --   --  48  --   --  51* 64*   AST  --   --   --  31  --   --  41 41   LIPASE  --   --   --   --   --   --  139 142   TROPI  --   --   --   --  <0.015 <0.015 <0.015 <0.015       No results found for this or  any previous visit (from the past 24 hour(s)).

## 2018-07-29 NOTE — PLAN OF CARE
"Problem: Patient Care Overview  Goal: Plan of Care/Patient Progress Review    Patient is alert and oriented X 4. VSS /65 (BP Location: Left arm)  Pulse 72  Temp 97.9  F (36.6  C) (Oral)  Resp 16  Ht 1.626 m (5' 4\")  Wt 69.1 kg (152 lb 6.4 oz)  SpO2 95%  BMI 26.16 kg/m2 on RA. Tele continues to be Afib with RVR. Denies SOB, or any other symptoms. Lasix drip discontinued and started on Torsemide PO. Heparin gtt continues. Coumadin given per order. Continues to complain of body cramps -- tylenol given per PRN order -- helpful. Potassium replaced per new protocol. Magnesium level is WNL.   Up in room per self. Family at bedside -- supportive. Will cont to monitor.       "

## 2018-07-29 NOTE — PROGRESS NOTES
[facility]    Cardiology Progress Note     Assessment & Plan   Christ Servin is a 58 year old female who was admitted on 7/26/2018.  Cardiology was consulted for recommendations regarding heart failure and atrial fibrillation.    Active Problems:    Atrial fibrillation (H)    Assessment: Rate control is better but still not completely controlled at this point.      Plan: At this point, her heart failure is been treated and she still remains in atrial fibrillation.  Her echocardiogram shows significant left and right atrial enlargement in the setting of severe mitral regurgitation and moderate-severe tricuspid valve regurgitation.  Given this is her first episode of atrial fibrillation I think it is reasonable to consider a RANDY guided cardioversion tomorrow.    Afterwards, we will need to continue to assess her cardiac function in sinus rhythm.  I expect that her tricuspid valve regurgitation would be improved with diuresis.  I think her mitral regurgitation will likely be unchanged versus slightly better.  At some point she will need evaluation of her coronary arteries given her reduced ejection fraction as well as potential need for intervention on her mitral valve at some point in the future.    I consented her for the cardioversion tomorrow.  We discussed the risk of adverse events as well as benefits and she did agree with proceeding in this fashion.  She will be n.p.o. at midnight for the procedure tomorrow.  We will continue rate control with metoprolol.  I am not going to add further rate control agent such as diltiazem at this point given the plan for cardioversion tomorrow.       Acute systolic heart failure     Assessment: Now euvolemic     Plan: I reduced her torsemide to 10 mg daily.  Will observe her diuresis on this dose targeting net even to -500 mL for the day.    Thong Alfonso MD    Interval History   Mrs. Servin feels considerably better.  She denies any sensations of  palpitations.  Her shortness of breath is greatly improved.    Physical Exam   Temp: 97.5  F (36.4  C) Temp src: Oral BP: 91/59 Pulse: 93 Heart Rate: 67 Resp: 16 SpO2: 91 % O2 Device: None (Room air)    Vitals:    07/27/18 1556 07/28/18 0500 07/29/18 0500   Weight: 71.7 kg (158 lb 1.6 oz) 69.1 kg (152 lb 6.4 oz) 69.3 kg (152 lb 11.2 oz)     Vital Signs with Ranges  Temp:  [97.1  F (36.2  C)-97.9  F (36.6  C)] 97.5  F (36.4  C)  Pulse:  [72-93] 93  Heart Rate:  [67-98] 67  Resp:  [16] 16  BP: ()/(52-65) 91/59  SpO2:  [91 %-95 %] 91 %  I/O last 3 completed shifts:  In: 240 [P.O.:240]  Out: -     Constitutional: No apparent distress.   Eyes: No xanthelasma or conjunctivitis  Respiratory: Clear to auscultation bilaterally. No crackles or wheezes.  Cardiovascular: Regular rate and rhythm. Normal S1 and S2. Systolic murmur heard best at apex. No extra heart sounds. No JVD.   Extremities: No peripheral edema.  Neurologic: Moving all extremities. No facial assymmetry.  Psychiatric: Alert and oriented. Answers questions appropriately.     Medications     HEParin 700 Units/hr (07/28/18 0941)     Warfarin Therapy Reminder         lisinopril  5 mg Oral BID     methimazole  5 mg Oral TID     metoprolol succinate  50 mg Oral BID     potassium chloride  40 mEq Oral BID     ranitidine  150 mg Oral BID     torsemide  10 mg Oral Daily     warfarin  7.5 mg Oral ONCE at 18:00       Data   Results for orders placed or performed during the hospital encounter of 07/26/18 (from the past 24 hour(s))   XR Chest 2 Views    Narrative    CHEST TWO VIEWS  7/28/2018 8:35 AM     HISTORY:  Pulmonary edema. Heart failure.    COMPARISON: 7/26/2018.      Impression    IMPRESSION: Small bilateral pleural effusions, larger on the right,  have improved slightly. Mild associated infiltrate or atelectasis at  the right lung base has also improved slightly. Cardiac enlargement is  again noted. Pulmonary vascularity is within normal limits.  No  pneumothorax.    SUDHEER JAVED MD   Heparin Xa level (AM Draw)   Result Value Ref Range    Heparin 10A Level 0.17 IU/mL   INR   Result Value Ref Range    INR 1.10 0.86 - 1.14   Basic metabolic panel   Result Value Ref Range    Sodium 137 133 - 144 mmol/L    Potassium 4.6 3.4 - 5.3 mmol/L    Chloride 99 94 - 109 mmol/L    Carbon Dioxide 30 20 - 32 mmol/L    Anion Gap 8 3 - 14 mmol/L    Glucose 112 (H) 70 - 99 mg/dL    Urea Nitrogen 25 7 - 30 mg/dL    Creatinine 1.40 (H) 0.52 - 1.04 mg/dL    GFR Estimate 39 (L) >60 mL/min/1.7m2    GFR Estimate If Black 47 (L) >60 mL/min/1.7m2    Calcium 8.6 8.5 - 10.1 mg/dL   Heparin Xa level (AM Draw)   Result Value Ref Range    Heparin 10A Level 0.34 IU/mL

## 2018-07-30 ENCOUNTER — APPOINTMENT (OUTPATIENT)
Dept: CARDIOLOGY | Facility: CLINIC | Age: 58
End: 2018-07-30
Attending: INTERNAL MEDICINE
Payer: MEDICAID

## 2018-07-30 ENCOUNTER — ANESTHESIA EVENT (OUTPATIENT)
Dept: SURGERY | Facility: CLINIC | Age: 58
End: 2018-07-30
Payer: MEDICAID

## 2018-07-30 ENCOUNTER — ANESTHESIA (OUTPATIENT)
Dept: SURGERY | Facility: CLINIC | Age: 58
End: 2018-07-30
Payer: MEDICAID

## 2018-07-30 LAB
ANION GAP SERPL CALCULATED.3IONS-SCNC: 9 MMOL/L (ref 3–14)
BUN SERPL-MCNC: 16 MG/DL (ref 7–30)
CALCIUM SERPL-MCNC: 8.8 MG/DL (ref 8.5–10.1)
CHLORIDE SERPL-SCNC: 103 MMOL/L (ref 94–109)
CO2 SERPL-SCNC: 24 MMOL/L (ref 20–32)
CREAT SERPL-MCNC: 1.17 MG/DL (ref 0.52–1.04)
GFR SERPL CREATININE-BSD FRML MDRD: 48 ML/MIN/1.7M2
GLUCOSE SERPL-MCNC: 104 MG/DL (ref 70–99)
INR PPP: 1.57 (ref 0.86–1.14)
INR PPP: 1.7 (ref 0.86–1.14)
LMWH PPP CHRO-ACNC: 0.36 IU/ML
LMWH PPP CHRO-ACNC: 0.47 IU/ML
MAGNESIUM SERPL-MCNC: 2 MG/DL (ref 1.6–2.3)
MAGNESIUM SERPL-MCNC: 3 MG/DL (ref 1.6–2.3)
POTASSIUM SERPL-SCNC: 5.3 MMOL/L (ref 3.4–5.3)
POTASSIUM SERPL-SCNC: 5.6 MMOL/L (ref 3.4–5.3)
SODIUM SERPL-SCNC: 136 MMOL/L (ref 133–144)

## 2018-07-30 PROCEDURE — 25000128 H RX IP 250 OP 636: Performed by: INTERNAL MEDICINE

## 2018-07-30 PROCEDURE — 37000008 ZZH ANESTHESIA TECHNICAL FEE, 1ST 30 MIN

## 2018-07-30 PROCEDURE — 36415 COLL VENOUS BLD VENIPUNCTURE: CPT | Performed by: INTERNAL MEDICINE

## 2018-07-30 PROCEDURE — 80048 BASIC METABOLIC PNL TOTAL CA: CPT | Performed by: INTERNAL MEDICINE

## 2018-07-30 PROCEDURE — 84132 ASSAY OF SERUM POTASSIUM: CPT | Performed by: INTERNAL MEDICINE

## 2018-07-30 PROCEDURE — 25000132 ZZH RX MED GY IP 250 OP 250 PS 637: Performed by: HOSPITALIST

## 2018-07-30 PROCEDURE — 21000001 ZZH R&B HEART CARE

## 2018-07-30 PROCEDURE — 25000132 ZZH RX MED GY IP 250 OP 250 PS 637: Performed by: INTERNAL MEDICINE

## 2018-07-30 PROCEDURE — 25000125 ZZHC RX 250: Performed by: INTERNAL MEDICINE

## 2018-07-30 PROCEDURE — 92960 CARDIOVERSION ELECTRIC EXT: CPT | Performed by: INTERNAL MEDICINE

## 2018-07-30 PROCEDURE — 36415 COLL VENOUS BLD VENIPUNCTURE: CPT | Performed by: HOSPITALIST

## 2018-07-30 PROCEDURE — 25000128 H RX IP 250 OP 636: Performed by: NURSE ANESTHETIST, CERTIFIED REGISTERED

## 2018-07-30 PROCEDURE — 93312 ECHO TRANSESOPHAGEAL: CPT | Mod: 26 | Performed by: INTERNAL MEDICINE

## 2018-07-30 PROCEDURE — 93005 ELECTROCARDIOGRAM TRACING: CPT

## 2018-07-30 PROCEDURE — 40000235 ZZH STATISTIC TELEMETRY

## 2018-07-30 PROCEDURE — 40000858 ZZH STATISTIC CARDIOVERSION

## 2018-07-30 PROCEDURE — 85610 PROTHROMBIN TIME: CPT | Performed by: INTERNAL MEDICINE

## 2018-07-30 PROCEDURE — 5A2204Z RESTORATION OF CARDIAC RHYTHM, SINGLE: ICD-10-PCS | Performed by: INTERNAL MEDICINE

## 2018-07-30 PROCEDURE — 93325 DOPPLER ECHO COLOR FLOW MAPG: CPT | Mod: 26 | Performed by: INTERNAL MEDICINE

## 2018-07-30 PROCEDURE — 83735 ASSAY OF MAGNESIUM: CPT | Performed by: INTERNAL MEDICINE

## 2018-07-30 PROCEDURE — 25000128 H RX IP 250 OP 636: Performed by: HOSPITALIST

## 2018-07-30 PROCEDURE — 93320 DOPPLER ECHO COMPLETE: CPT | Mod: 26 | Performed by: INTERNAL MEDICINE

## 2018-07-30 PROCEDURE — 25000125 ZZHC RX 250: Performed by: NURSE ANESTHETIST, CERTIFIED REGISTERED

## 2018-07-30 PROCEDURE — 85520 HEPARIN ASSAY: CPT | Performed by: INTERNAL MEDICINE

## 2018-07-30 PROCEDURE — 99232 SBSQ HOSP IP/OBS MODERATE 35: CPT | Performed by: PHYSICIAN ASSISTANT

## 2018-07-30 PROCEDURE — 85520 HEPARIN ASSAY: CPT | Performed by: HOSPITALIST

## 2018-07-30 PROCEDURE — 93010 ELECTROCARDIOGRAM REPORT: CPT | Performed by: INTERNAL MEDICINE

## 2018-07-30 PROCEDURE — 76376 3D RENDER W/INTRP POSTPROCES: CPT

## 2018-07-30 PROCEDURE — 40000857 ZZH STATISTIC TEE INCLUDES SEDATION

## 2018-07-30 RX ORDER — FENTANYL CITRATE 50 UG/ML
50-100 INJECTION, SOLUTION INTRAMUSCULAR; INTRAVENOUS
Status: COMPLETED | OUTPATIENT
Start: 2018-07-30 | End: 2018-07-30

## 2018-07-30 RX ORDER — POTASSIUM CHLORIDE 1500 MG/1
20 TABLET, EXTENDED RELEASE ORAL
Status: DISCONTINUED | OUTPATIENT
Start: 2018-07-30 | End: 2018-07-30 | Stop reason: HOSPADM

## 2018-07-30 RX ORDER — FENTANYL CITRATE 50 UG/ML
25-50 INJECTION, SOLUTION INTRAMUSCULAR; INTRAVENOUS
Status: DISCONTINUED | OUTPATIENT
Start: 2018-07-30 | End: 2018-07-30 | Stop reason: HOSPADM

## 2018-07-30 RX ORDER — MAGNESIUM SULFATE HEPTAHYDRATE 40 MG/ML
2 INJECTION, SOLUTION INTRAVENOUS
Status: DISCONTINUED | OUTPATIENT
Start: 2018-07-30 | End: 2018-07-30 | Stop reason: HOSPADM

## 2018-07-30 RX ORDER — PROPOFOL 10 MG/ML
INJECTION, EMULSION INTRAVENOUS PRN
Status: DISCONTINUED | OUTPATIENT
Start: 2018-07-30 | End: 2018-07-30

## 2018-07-30 RX ORDER — FLUMAZENIL 0.1 MG/ML
0.2 INJECTION, SOLUTION INTRAVENOUS
Status: DISCONTINUED | OUTPATIENT
Start: 2018-07-30 | End: 2018-07-30 | Stop reason: HOSPADM

## 2018-07-30 RX ORDER — SODIUM CHLORIDE 9 MG/ML
1000 INJECTION, SOLUTION INTRAVENOUS CONTINUOUS
Status: DISCONTINUED | OUTPATIENT
Start: 2018-07-30 | End: 2018-07-30 | Stop reason: HOSPADM

## 2018-07-30 RX ORDER — LIDOCAINE HYDROCHLORIDE 40 MG/ML
1.5 SOLUTION TOPICAL ONCE
Status: COMPLETED | OUTPATIENT
Start: 2018-07-30 | End: 2018-07-30

## 2018-07-30 RX ORDER — ATROPINE SULFATE 0.1 MG/ML
.5-1 INJECTION INTRAVENOUS
Status: DISCONTINUED | OUTPATIENT
Start: 2018-07-30 | End: 2018-07-30 | Stop reason: HOSPADM

## 2018-07-30 RX ORDER — POTASSIUM CHLORIDE 1500 MG/1
40 TABLET, EXTENDED RELEASE ORAL
Status: DISCONTINUED | OUTPATIENT
Start: 2018-07-30 | End: 2018-07-30 | Stop reason: HOSPADM

## 2018-07-30 RX ORDER — NALOXONE HYDROCHLORIDE 0.4 MG/ML
.1-.4 INJECTION, SOLUTION INTRAMUSCULAR; INTRAVENOUS; SUBCUTANEOUS
Status: DISCONTINUED | OUTPATIENT
Start: 2018-07-30 | End: 2018-07-30 | Stop reason: HOSPADM

## 2018-07-30 RX ORDER — EPHEDRINE SULFATE 50 MG/ML
INJECTION, SOLUTION INTRAMUSCULAR; INTRAVENOUS; SUBCUTANEOUS PRN
Status: DISCONTINUED | OUTPATIENT
Start: 2018-07-30 | End: 2018-07-30

## 2018-07-30 RX ORDER — WARFARIN SODIUM 5 MG/1
5 TABLET ORAL
Status: COMPLETED | OUTPATIENT
Start: 2018-07-30 | End: 2018-07-30

## 2018-07-30 RX ORDER — GLYCOPYRROLATE 0.2 MG/ML
0.1 INJECTION, SOLUTION INTRAMUSCULAR; INTRAVENOUS ONCE
Status: COMPLETED | OUTPATIENT
Start: 2018-07-30 | End: 2018-07-30

## 2018-07-30 RX ADMIN — METOPROLOL SUCCINATE 75 MG: 50 TABLET, EXTENDED RELEASE ORAL at 22:06

## 2018-07-30 RX ADMIN — RANITIDINE 150 MG: 150 TABLET ORAL at 22:06

## 2018-07-30 RX ADMIN — ACETAMINOPHEN 650 MG: 325 TABLET, FILM COATED ORAL at 19:20

## 2018-07-30 RX ADMIN — Medication 5 MG: at 09:45

## 2018-07-30 RX ADMIN — METHIMAZOLE 5 MG: 5 TABLET ORAL at 22:06

## 2018-07-30 RX ADMIN — Medication 5 MG: at 09:47

## 2018-07-30 RX ADMIN — WARFARIN SODIUM 5 MG: 5 TABLET ORAL at 18:06

## 2018-07-30 RX ADMIN — PROPOFOL 40 MG: 10 INJECTION, EMULSION INTRAVENOUS at 09:42

## 2018-07-30 RX ADMIN — LIDOCAINE HYDROCHLORIDE 1.5 ML: 40 SOLUTION TOPICAL at 08:43

## 2018-07-30 RX ADMIN — SODIUM CHLORIDE 1000 ML: 9 INJECTION, SOLUTION INTRAVENOUS at 08:06

## 2018-07-30 RX ADMIN — POTASSIUM CHLORIDE 40 MEQ: 1500 TABLET, EXTENDED RELEASE ORAL at 22:06

## 2018-07-30 RX ADMIN — TORSEMIDE 10 MG: 10 TABLET ORAL at 11:30

## 2018-07-30 RX ADMIN — MIDAZOLAM HYDROCHLORIDE 2 MG: 1 INJECTION, SOLUTION INTRAMUSCULAR; INTRAVENOUS at 09:06

## 2018-07-30 RX ADMIN — METOPROLOL SUCCINATE 75 MG: 50 TABLET, EXTENDED RELEASE ORAL at 11:31

## 2018-07-30 RX ADMIN — FENTANYL CITRATE 50 MCG: 50 INJECTION, SOLUTION INTRAMUSCULAR; INTRAVENOUS at 09:10

## 2018-07-30 RX ADMIN — METHIMAZOLE 5 MG: 5 TABLET ORAL at 18:06

## 2018-07-30 RX ADMIN — RANITIDINE 150 MG: 150 TABLET ORAL at 11:31

## 2018-07-30 RX ADMIN — METHIMAZOLE 5 MG: 5 TABLET ORAL at 11:30

## 2018-07-30 RX ADMIN — TOPICAL ANESTHETIC 0.5 ML: 200 SPRAY DENTAL; PERIODONTAL at 09:02

## 2018-07-30 RX ADMIN — MAGNESIUM SULFATE HEPTAHYDRATE 2 G: 40 INJECTION, SOLUTION INTRAVENOUS at 02:55

## 2018-07-30 RX ADMIN — GLYCOPYRROLATE 0.1 MG: 0.2 INJECTION, SOLUTION INTRAMUSCULAR; INTRAVENOUS at 08:40

## 2018-07-30 RX ADMIN — HEPARIN SODIUM 600 UNITS/HR: 10000 INJECTION, SOLUTION INTRAVENOUS at 12:26

## 2018-07-30 RX ADMIN — POTASSIUM CHLORIDE 40 MEQ: 1500 TABLET, EXTENDED RELEASE ORAL at 11:30

## 2018-07-30 ASSESSMENT — ACTIVITIES OF DAILY LIVING (ADL)
ADLS_ACUITY_SCORE: 9

## 2018-07-30 ASSESSMENT — ENCOUNTER SYMPTOMS: DYSRHYTHMIAS: 1

## 2018-07-30 NOTE — PROVIDER NOTIFICATION
Brief update:    Paged w/ Mag 2.0. Multiple mag replacement orders in (one is cardiac one time pre-procedure order)    Give 2g mag as ordered through replacement protocol    Christian Jeronimo MD  2:13 AM

## 2018-07-30 NOTE — ANESTHESIA CARE TRANSFER NOTE
Patient: Christ Servin    Procedure(s):  RANDY, CARDIOVERSION.    Diagnosis: ATRIAL FIBRILLATION.  Diagnosis Additional Information: No value filed.    Anesthesia Type:   General     Note:  Airway :Nasal Cannula  Destination: Special Echo.  Comments: Pt exhibits spontaneous respirations, all monitors and alarms on in special Echo, VSS, patent IV, report and transfer of care to RN.        Vitals: (Last set prior to Anesthesia Care Transfer)    CRNA VITALS  7/30/2018 0941 - 7/30/2018 1011      7/30/2018             NIBP: 97/66    Pulse: 78    NIBP Mean: 78                Electronically Signed By: DIMITRI Mcmahon CRNA  July 30, 2018  10:11 AM

## 2018-07-30 NOTE — ANESTHESIA PREPROCEDURE EVALUATION
Procedure: Procedure(s):  ANESTHESIA OUT OF OR  Preop diagnosis: ATRIAL FIBRILLATION.    No Known Allergies  History reviewed. No pertinent past medical history.  Past Surgical History:   Procedure Laterality Date     CHOLECYSTECTOMY       Social History   Substance Use Topics     Smoking status: Never Smoker     Smokeless tobacco: Never Used     Alcohol use No     Prior to Admission medications    Medication Sig Start Date End Date Taking? Authorizing Provider   albuterol (PROAIR HFA/PROVENTIL HFA/VENTOLIN HFA) 108 (90 Base) MCG/ACT Inhaler Inhale 2 puffs into the lungs every 6 hours as needed for shortness of breath / dyspnea or wheezing 7/5/18  Yes Gary Bunch MD   bismuth subsalicylate (PEPTO BISMOL) 262 MG/15ML suspension Take 15 mLs by mouth every 6 hours as needed for indigestion   Yes Reported, Patient   ranitidine (ZANTAC) 150 MG tablet Take 1 tablet (150 mg) by mouth 2 times daily for 14 days 7/23/18 8/6/18 Yes Parveen Sams MD     Current Facility-Administered Medications Ordered in Epic   Medication Dose Route Frequency Last Rate Last Dose     acetaminophen (TYLENOL) Suppository 650 mg  650 mg Rectal Q4H PRN         acetaminophen (TYLENOL) tablet 650 mg  650 mg Oral Q4H PRN   650 mg at 07/28/18 2041     albuterol (PROAIR HFA/PROVENTIL HFA/VENTOLIN HFA) Inhaler 2 puff  2 puff Inhalation Q6H PRN         atropine injection 0.5-1 mg  0.5-1 mg Intravenous Once PRN         bisacodyl (DULCOLAX) Suppository 10 mg  10 mg Rectal Daily PRN         diltiazem (CARDIZEM) 125 mg in sodium chloride 0.9 % 125 mL infusion  5-15 mg/hr Intravenous Continuous   Stopped at 07/30/18 0855     fentaNYL (PF) (SUBLIMAZE) injection 25-50 mcg  25-50 mcg Intravenous Q2 Min PRN         flumazenil (ROMAZICON) injection 0.2 mg  0.2 mg Intravenous q1 min prn         Give   of usual dose of LONG ACTING insulin AM of procedure IF diabetic   Does not apply Continuous PRN         heparin infusion 25,000 units in 0.45% NaCl 250 mL   700 Units/hr Intravenous Continuous 7 mL/hr at 07/30/18 0020 700 Units/hr at 07/30/18 0020     HOLD digoxin (LANOXIN)  AM of procedure IF on digoxin   Does not apply HOLD         HOLD: Insulin - RAPID/SHORT acting AM of procedure IF diabetic   Does not apply HOLD         HOLD: Insulin - REGULAR AM of procedure IF diabetic   Does not apply HOLD         HOLD: Oral hypoglycemics AM of procedure IF diabetic   Does not apply HOLD         lisinopril (PRINIVIL/ZESTRIL) tablet 5 mg  5 mg Oral BID   5 mg at 07/29/18 2040     magnesium sulfate 2 g in water intermittent infusion  2 g Intravenous Q1H PRN         magnesium sulfate 2 g in water intermittent infusion  2 g Intravenous Once PRN         magnesium sulfate 2 g in water intermittent infusion  2 g Intravenous Daily PRN   2 g at 07/30/18 0255     magnesium sulfate 4 g in 100 mL sterile water (premade)  4 g Intravenous Q4H PRN         May take oral meds with a sip of water, the morning of Cardioversion procedure.       Does not apply Continuous PRN         melatonin tablet 1 mg  1 mg Oral At Bedtime PRN         methimazole (TAPAZOLE) tablet 5 mg  5 mg Oral TID   5 mg at 07/29/18 2039     metoprolol (LOPRESSOR) injection 2.5-5 mg  2.5-5 mg Intravenous Q4H PRN         metoprolol succinate (TOPROL-XL) 24 hr tablet 75 mg  75 mg Oral BID   75 mg at 07/29/18 2040     midazolam (VERSED) injection 1 mg  1 mg Intravenous Q2 Min PRN         naloxone (NARCAN) injection 0.1-0.4 mg  0.1-0.4 mg Intravenous Q2 Min PRN         naloxone (NARCAN) injection 0.1-0.4 mg  0.1-0.4 mg Intravenous Q2 Min PRN         ondansetron (ZOFRAN-ODT) ODT tab 4 mg  4 mg Oral Q6H PRN        Or     ondansetron (ZOFRAN) injection 4 mg  4 mg Intravenous Q6H PRN         polyethylene glycol (MIRALAX/GLYCOLAX) Packet 17 g  17 g Oral Daily PRN         potassium chloride (KLOR-CON) Packet 20-40 mEq  20-40 mEq Oral or Feeding Tube Q2H PRN         potassium chloride 10 mEq in 100 mL intermittent infusion with 10 mg  lidocaine  10 mEq Intravenous Q1H PRN         potassium chloride 10 mEq in 100 mL sterile water intermittent infusion (premix)  10 mEq Intravenous Q1H PRN         potassium chloride 20 mEq in 50 mL intermittent infusion  20 mEq Intravenous Q1H PRN         potassium chloride SA (K-DUR/KLOR-CON M) CR tablet 20 mEq  20 mEq Oral Q1H PRN         potassium chloride SA (K-DUR/KLOR-CON M) CR tablet 20 mEq  20 mEq Oral Once PRN         potassium chloride SA (K-DUR/KLOR-CON M) CR tablet 20-40 mEq  20-40 mEq Oral Q2H PRN   20 mEq at 07/28/18 1546     potassium chloride SA (K-DUR/KLOR-CON M) CR tablet 40 mEq  40 mEq Oral Q1H PRN         potassium chloride SA (K-DUR/KLOR-CON M) CR tablet 40 mEq  40 mEq Oral Once PRN         potassium chloride SA (K-DUR/KLOR-CON M) CR tablet 40 mEq  40 mEq Oral BID   40 mEq at 07/29/18 2041     ranitidine (ZANTAC) tablet 150 mg  150 mg Oral BID   150 mg at 07/29/18 2040     senna-docusate (SENOKOT-S;PERICOLACE) 8.6-50 MG per tablet 1 tablet  1 tablet Oral BID PRN        Or     senna-docusate (SENOKOT-S;PERICOLACE) 8.6-50 MG per tablet 2 tablet  2 tablet Oral BID PRN         sodium chloride (PF) 0.9% PF flush 3 mL  3 mL Intravenous Q1H PRN         sodium chloride (PF) 0.9% PF flush 3 mL  3 mL Intravenous Q8H         sodium chloride (PF) 0.9% PF flush 9.5 mL  9.5 mL Intravenous q1 min prn         sodium chloride 0.9% infusion  1,000 mL Intravenous Continuous 30 mL/hr at 07/30/18 0806 1,000 mL at 07/30/18 0806     torsemide (DEMADEX) tablet 10 mg  10 mg Oral Daily   10 mg at 07/29/18 0831     Warfarin Therapy Reminder (Check START DATE - warfarin may be starting in the FUTURE)  1 each Does not apply Continuous PRN         No current [facility]-ordered outpatient prescriptions on file.       diltiazem (CARDIZEM) infusion ADULT Stopped (07/30/18 0855)     - MEDICATION INSTRUCTIONS -       HEParin 700 Units/hr (07/30/18 0020)     - MEDICATION INSTRUCTIONS -       sodium chloride 1,000 mL (07/30/18 0884)      Warfarin Therapy Reminder       Wt Readings from Last 1 Encounters:   07/30/18 69.8 kg (153 lb 12.8 oz)     Temp Readings from Last 1 Encounters:   07/30/18 36.7  C (98.1  F) (Oral)     BP Readings from Last 6 Encounters:   07/30/18 103/80   07/23/18 (!) 127/112   07/05/18 106/80   07/01/18 110/60   06/25/18 104/80     Pulse Readings from Last 4 Encounters:   07/30/18 127   07/05/18 90   07/01/18 55   06/25/18 96     Resp Readings from Last 1 Encounters:   07/30/18 16     SpO2 Readings from Last 1 Encounters:   07/30/18 100%     Recent Labs   Lab Test  07/30/18   0626  07/30/18   0035  07/29/18   0544   NA  136   --   137   POTASSIUM  5.3  5.6*  4.6   CHLORIDE  103   --   99   CO2  24   --   30   ANIONGAP  9   --   8   GLC  104*   --   112*   BUN  16   --   25   CR  1.17*   --   1.40*   ANKUSH  8.8   --   8.6     Recent Labs   Lab Test  07/27/18   0516  07/26/18   0538  07/23/18   1016   AST  31  41  41   ALT  48  51*  64*   ALKPHOS  103  101  117   BILITOTAL  1.8*  1.5*  1.8*   LIPASE   --   139  142     Recent Labs   Lab Test  07/27/18   0516  07/26/18   0538   WBC  7.3  8.6   HGB  13.9  14.8   PLT  161  171     No results for input(s): ABO, RH in the last 09672 hours.  Recent Labs   Lab Test  07/30/18   0626  07/30/18   0035   INR  1.70*  1.57*      Recent Labs   Lab Test  07/26/18   1308  07/26/18   0946  07/26/18   0538   TROPI  <0.015  <0.015  <0.015     No results for input(s): PH, PCO2, PO2, HCO3 in the last 42689 hours.  No results for input(s): HCG in the last 60731 hours.  Recent Results (from the past 744 hour(s))   XR Chest 2 Views    Narrative    CHEST TWO VIEWS  7/1/2018 10:33 AM     HISTORY: Cough; nausea and vomiting, intractability of vomiting not  specified, unspecified vomiting type; abdominal pain, epigastric.    COMPARISON: Hypoinflated lungs. Bibasilar atelectasis. Difficult to  exclude bibasilar airspace disease. Unremarkable cardiac silhouette.      Impression    IMPRESSION: No acute  cardiopulmonary disease.    KAROLINE ROGEL MD   US Abdomen Limited    Narrative    ULTRASOUND ABDOMEN LIMITED   7/23/2018 11:17 AM     HISTORY: Right upper quadrant pain.    COMPARISON: None.  .  FINDINGS: The liver is unremarkable. No evidence for fatty  infiltration. No focal hepatic lesions. The gallbladder is not seen,  consistent with history of prior cholecystectomy. No intra- or  extrahepatic bile duct dilatation. The common duct could not be  visualized in its entirety. Limited evaluation of the right kidney is  unremarkable. The abdominal aorta is obscured by overlying bowel gas  and could not be evaluated. Incidentally noted right pleural effusion.      Impression    IMPRESSION:   1. Prior cholecystectomy.  2. Nonvisualization of the pancreas.  3. Incidentally noted right pleural effusion.    SUDHEER JAVED MD   CT Abdomen Pelvis w Contrast    Narrative    CT ABDOMEN AND PELVIS WITH CONTRAST   7/23/2018 1:55 PM     HISTORY: Right lower quadrant pain.    TECHNIQUE: 84mL Isovue-370 IV were administered. After contrast  administration, volumetric helical sections were acquired from the  lung bases to the ischial tuberosities. Coronal images were also  reconstructed. Radiation dose for this scan was reduced using  automated exposure control, adjustment of the mA and/or kV according  to patient size, or iterative reconstruction technique.    COMPARISON: Right upper quadrant ultrasound performed earlier today.     FINDINGS: No bowel obstruction. Unremarkable appendix.  No convincing  evidence for colitis or diverticulitis. Scattered colonic  diverticulosis is noted. There is a small amount of ascites noted. The  gallbladder is not seen, consistent with history of prior  cholecystectomy. The liver, spleen, adrenal glands, pancreas, and  kidneys are unremarkable. No hydronephrosis. Mild atherosclerotic  aortoiliac calcification. No adnexal masses are identified. Small  hiatal hernia. There are bilateral pleural  effusions, moderate on the  right and small on the left, with mild associated compressive  atelectasis at both lung bases.        Impression    IMPRESSION:   1. Mild ascites.  2. Colonic diverticulosis, without evidence for diverticulitis.  3. Bilateral pleural effusions, moderate on the right and small on the  left.    SUDHEER JAVED MD   Chest XR,  PA & LAT    Narrative    XR CHEST 2 VW 7/26/2018 7:26 AM    COMPARISON: 7/1/2018    HISTORY: Chest pain and shortness of breath while lying down.      Impression    IMPRESSION: Small RIGHT and trace LEFT pleural effusions, slightly  increased on the RIGHT and unchanged on the LEFT since 7/1/2018. There  is associated atelectasis. No pneumothorax seen on either side.    YE GONZALEZ MD   US Thyroid    Narrative    ULTRASOUND THYROID  7/26/2018 12:12 PM     COMPARISON: None.    HISTORY: Elevated TSH and T4. Rule out tumor.     FINDINGS: The right lobe measures 4.3 x 1.2 x 1.6 cm. The left lobe  measures 3.7 x 1.3 x 1.7 cm. The isthmus is mildly thickened at 5 mm.  Thyroid parenchyma is mildly heterogeneous and hypoechoic in  echotexture.    Thyroid nodules as follows:     1. 7 x 7 x 7 mm nodule in the mid lateral left lobe.      Impression    IMPRESSION: Heterogeneously hypoechoic thyroid with a single  indeterminate nodule measuring 7 mm.    RADHA VILLA MD   XR Chest 2 Views    Narrative    CHEST TWO VIEWS  7/28/2018 8:35 AM     HISTORY:  Pulmonary edema. Heart failure.    COMPARISON: 7/26/2018.      Impression    IMPRESSION: Small bilateral pleural effusions, larger on the right,  have improved slightly. Mild associated infiltrate or atelectasis at  the right lung base has also improved slightly. Cardiac enlargement is  again noted. Pulmonary vascularity is within normal limits. No  pneumothorax.    SUDHEER JAVED MD           Anesthesia Evaluation     . Pt has had prior anesthetic.     No history of anesthetic complications          ROS/MED HX    ENT/Pulmonary:      (+)allergic rhinitis, , . .    Neurologic:       Cardiovascular:     (+) ----. : . CHF etiology: both left and right sided dysfunction  Last EF: 35% . . :. dysrhythmias a-fib, Irregular Heartbeat/Palpitations, valvular problems/murmurs type: MR .       METS/Exercise Tolerance:     Hematologic:         Musculoskeletal:         GI/Hepatic:     (+) GERD      (-) liver disease   Renal/Genitourinary:      (-) renal disease   Endo:         Psychiatric:         Infectious Disease:         Malignancy:         Other:                     Physical Exam      Airway   Mallampati: III  TM distance: <3 FB    Dental   (+) upper dentures and lower dentures    Cardiovascular   (-) no murmur    Pulmonary                     Anesthesia Plan      History & Physical Review  History and physical reviewed and following examination; no interval change.    ASA Status:  3 .    NPO Status:  > 8 hours    Plan for General     mask      Postoperative Care      Consents  Anesthetic plan, risks, benefits and alternatives discussed with:  Patient..                          .

## 2018-07-30 NOTE — PROGRESS NOTES
0830 A/O. Pt denies difficulty swallowing or sleep apnea., dentures removed or no loose teeth. NPO since last night. Pre / post procedure instructions given to pt pre procedure w/ verbal understanding received.  All questions & concerns addressed.   Dr. Ginger ASHBY Sedation Assessment completed at this time.  0900 Time Out done at this time.    RANDY: Pt tolerated well. VSS. Total sedation given - 2 mg Versed & 50 mcg Fentanyl. Dr Miles spoke with pt  post procedure.See RANDY Flowsheet.   CRNA and MDA were notified about DCCV.  DCCV at 0942 with 120 Joules, converted pt from NELY (up to 160)  to SR at 67, BP was low, pt received 10 mg of Jagdish per CRNA.  SBP is improving to 106 /71    Pt transferred to 243-2 per cart. Detailed report called to Maryann.  Resp even & unlabored upon transfer. Pt  A/O. Pt to be NPO until 1200  Both pt & nurse informed.

## 2018-07-30 NOTE — PROVIDER NOTIFICATION
Notified PA at 0830 AM regarding hypotension.      Spoke with: Triny HASSAN.    Orders were not obtained.    Comments: Pt BP low in the special ECHO room, Diltiazem drip stopped. Provider notified, continue to monitor Pt.

## 2018-07-30 NOTE — ANESTHESIA POSTPROCEDURE EVALUATION
Patient: Christ Servin    Procedure(s):  RANDY, CARDIOVERSION.    Diagnosis:ATRIAL FIBRILLATION.  Diagnosis Additional Information: No value filed.    Anesthesia Type:  General    Note:  Anesthesia Post Evaluation    Patient location during evaluation: PACU  Patient participation: Able to fully participate in evaluation  Level of consciousness: awake, awake and alert and responsive to verbal stimuli  Pain management: adequate  Airway patency: patent  Cardiovascular status: acceptable  Respiratory status: acceptable  Hydration status: acceptable  PONV: none     Anesthetic complications: None          Last vitals:  Vitals:    07/30/18 1003 07/30/18 1006 07/30/18 1042   BP: 106/71 90/68 92/72   Pulse: 81 82 79   Resp:  16    Temp:      SpO2: 100% 99%          Electronically Signed By: Mariaa Ventura  July 30, 2018  10:54 AM

## 2018-07-30 NOTE — PROVIDER NOTIFICATION
07/30/2018  1:38 AM        Order clarification needed regarding Magnesium replacement. Last level is 2.0; should it be replaced?  Pt scheduled for cardioversion tomorrow.

## 2018-07-30 NOTE — PROGRESS NOTES
[facility]    Hospitalist Progress Note    Date of Service (when I saw the patient): 07/30/2018    Assessment & Plan   Christ Servin is a 58 year old female with a past medical history of GERD who was admitted on 7/26/2018 with shortness of breath and afib with RVR.     Atrial fibrillation with rapid ventricular rate:   New diagnosis for the patient and associated with shortness of breath, see acute CHF exacerbation below.  Notably she does have evidence of hyperthyroidism which could be driving some of this.  She was initially given metoprolol and then started on diltiazem and a heparin drip.   - Attempted up titration of BB without improvement in rates  - S/p successful RANDY with cardioversion 7/30  - Will continue BB for rate control, BPs soft this am. Dicussed with Cards. Will discontinue lisionpril and continue current dose of BB.  - Continues on iv Heparin and initiated on Warfarin due to lack of insurance coverage. INR 1.70. Warfarin per pharmacy  - See below for discussion about hyperthyroidism  - Correct electrolytes as needed.     Acute congestive heart failure exacerbation:    Noted that abd CT shows ascites which is likely from CHF.  Suspect this is related to the atrial fibrillation.  Echo shows preserved EF of 45-50% with moderate global hypokinesia, decreased RV function, dilated atria, dilated IVC, pulmonary hypertension and mod/severe TR with a small pericardial effusion.    -Cardiology following  -Treated with a lasix drip - pt now appears euvolemic and was started on Torsemide 20 mg po daily 7/28 and decreased to 10 mg/d 7/29  - Started on Lisinopril 5 mg bid. BPs soft. Will dc to allow more room for BB. Can be added as outpatient if indicated.        Acute kidney injury, stage II:   Patient with a creatinine of 1.20 on admission, elevated from baseline of approximately 1.04.  - Cr improved with reduced diuretic.   - Follow bmp in am.   - Avoid nephrotoxins.      Abdominal ascites  with slight bili elevation:   Patient with CT Abd completed noting ascites. U/s ruq completed 7/23 showing prior cholecystectomy, and no evidence for fatty infiltration. ALT elevated on 7/1, however, improving on 7/26/18.  Wonder if this is all due to volume overload with CHF  - repeat LFTs improving.   - diuresis as above       Hyperthyroidism:   Patient with a TSH of 4.78, and a free T4 level of 1.65.  Repeat on 7/27 shows TSH of 3.5 and FT4 of 1.78.  Thyroid US shows a 7mm nodule in mid lateral left lobe.  - Started methimazole as patient is symptomatic with afib  - will need outpatient endocrine follow up to discuss further treatment and work up      GERD: Stable.  - Continue prior to admission ranitidine.    DVT Prophylaxis: Warfarin  Code Status: Full Code    # Pain Assessment:  Current Pain Score 7/30/2018   Patient currently in pain? denies   Pain score (0-10) -   Pain location -   Pain descriptors -   Christ guajardo pain level was assessed and she currently denies pain.        Disposition: S/p RANDY cardioversion today. Cardiac rehab today. Adjusting BP meds given soft BPs. Continue heparin bridge to warfarin. Hopeful for discharge tomorrow if INR >2 and BP stable      Triny Huffman       Interval History   Discussed with Cardiology PA    Evaluated after cardioversion. Doing well. Breathing improved. Has not been out of bed yet following cardioversion.     -Data reviewed today: I reviewed all new labs and imaging results over the last 24 hours. I personally reviewed no images or EKG's today.    Physical Exam   Temp: 98.1  F (36.7  C) Temp src: Oral BP: 92/72 Pulse: 79 Heart Rate: 132 Resp: 16 SpO2: 99 % O2 Device: None (Room air) Oxygen Delivery: 2 LPM  Vitals:    07/28/18 0500 07/29/18 0500 07/30/18 0700   Weight: 69.1 kg (152 lb 6.4 oz) 69.3 kg (152 lb 11.2 oz) 69.8 kg (153 lb 12.8 oz)     Vital Signs with Ranges  Temp:  [97.9  F (36.6  C)-98.1  F (36.7  C)] 98.1  F (36.7  C)  Pulse:  [] 79  Heart Rate:   [] 132  Resp:  [16-18] 16  BP: ()/(37-93) 92/72  SpO2:  [94 %-100 %] 99 %  I/O last 3 completed shifts:  In: 808.4 [P.O.:730; I.V.:78.4]  Out: 500 [Urine:500]    Gen: Patient in no acute distress.  Appears comfortable.  Heart:  S1S2+, RRR, No murmurs.  Lungs:  Clear to auscultation at apices, no wheezing, no rales.  Abdomen:  Soft, non tender, non distended, bowel sounds positive.  Extremities:  No edema.    Medications     diltiazem (CARDIZEM) infusion ADULT Stopped (07/30/18 0855)     HEParin 700 Units/hr (07/30/18 0020)     Warfarin Therapy Reminder         lisinopril  5 mg Oral BID     methimazole  5 mg Oral TID     metoprolol succinate  75 mg Oral BID     potassium chloride  40 mEq Oral BID     ranitidine  150 mg Oral BID     torsemide  10 mg Oral Daily       Data     Recent Labs  Lab 07/30/18  0626 07/30/18  0035 07/29/18  0544 07/28/18  0540  07/27/18  0516 07/26/18  1308 07/26/18  0946 07/26/18  0538   WBC  --   --   --   --   --  7.3  --   --  8.6   HGB  --   --   --   --   --  13.9  --   --  14.8   MCV  --   --   --   --   --  93  --   --  94   PLT  --   --   --   --   --  161  --   --  171   INR 1.70* 1.57* 1.10 1.07  < >  --   --   --   --      --  137 139  --  144  --   --  143   POTASSIUM 5.3 5.6* 4.6 3.4  --  3.5  --   --  4.7   CHLORIDE 103  --  99 94  --  104  --   --  110*   CO2 24  --  30 34*  --  32  --   --  22   BUN 16  --  25 18  --  18  --   --  16   CR 1.17*  --  1.40* 1.28*  --  1.33*  --   --  1.20*   ANIONGAP 9  --  8 11  --  8  --   --  11   ANKUSH 8.8  --  8.6 9.3  --  8.9  --   --  8.6   *  --  112* 104*  --  116*  --   --  116*   ALBUMIN  --   --   --   --   --  3.9  --   --  3.6   PROTTOTAL  --   --   --   --   --  6.9  --   --  6.7*   BILITOTAL  --   --   --   --   --  1.8*  --   --  1.5*   ALKPHOS  --   --   --   --   --  103  --   --  101   ALT  --   --   --   --   --  48  --   --  51*   AST  --   --   --   --   --  31  --   --  41   LIPASE  --   --   --    --   --   --   --   --  139   TROPI  --   --   --   --   --   --  <0.015 <0.015 <0.015   < > = values in this interval not displayed.    No results found for this or any previous visit (from the past 24 hour(s)).

## 2018-07-30 NOTE — PROGRESS NOTES
Recommendations are for patient to follow up in [facility] and Outpatient Sleep Study at discharge.   Patient does not have insurance, so cost will be out of pocket.  Contacted Flower at [facility] re: cost of clinic visit and she will find out about cost and contact Sunitha MEJIA on Tuesday with amount.  Writer did meet with patient and her significant other Bill and explained the above recommendations and that due to no insurance she would be paying 100% of bill.   Told patient and Bill that someone from the care transitions team would be in contact with them tomorrow morning with update on pricing.

## 2018-07-30 NOTE — PLAN OF CARE
Problem: Cardiac: Heart Failure (Adult)  Goal: Signs and Symptoms of Listed Potential Problems Will be Absent, Minimized or Managed (Cardiac: Heart Failure)  Signs and symptoms of listed potential problems will be absent, minimized or managed by discharge/transition of care (reference Cardiac: Heart Failure (Adult) CPG).   Outcome: No Change  Heart Failure Care Pathway  GOALS TO BE MET BEFORE DISCHARGE:    1. Decrease congestion and/or edema with diuretic therapy to achieve near      optimal volume status.            Dyspnea improved:  Yes            Edema improved:     Yes        Net I/O and Weights since admission:          06/29 2300 - 07/29 2259  In: 1070 [P.O.:1070]  Out: 7975 [Urine:7975]  Net: -6905            Vitals:    07/26/18 0331 07/27/18 1556 07/28/18 0500 07/29/18 0500   Weight: 80 kg (176 lb 5.9 oz) 71.7 kg (158 lb 1.6 oz) 69.1 kg (152 lb 6.4 oz) 69.3 kg (152 lb 11.2 oz)       2.  O2 sats > 92% on RA or at prior home O2 therapy level.          Current oxygenation status:       SpO2: 94 %         O2 Device: None (Room air),            Able to wean O2 this shift to keep sats > 92%:  Yes       Does patient use Home O2? No    3.  Tolerates ambulation and mobility near baseline: Yes        How many times did the patient ambulate with nursing staff this shift? 1    Pt alert, oriented and able to initiate needs appropriately.  Pt in afib with rates in 80's to low 100's.  SBP remains in 90's-100's and pt is asymptomatic.  Pt does typically run lower BPs.  Pt denies any pain.  Diltiazem at 2.5mg/hr and heparin at 700u/hr infusing as ordered.  No further issues noted.  Plan for RANDY/Cardioversion in am.  Pt will be NPO after midnight.    Please review the Heart Failure Care Pathway for additional HF goal outcomes.    JOSE EDUARDO DAWKINS RN  7/29/2018

## 2018-07-30 NOTE — PHARMACY
Coverage check for pharmacy benefits turned up no coverage. Per financial counselor, patient will be eligible for emergency MA, which means no pharmacy benefits. Pharmacy will use a coupon card for the patient to lower cost, and it would be in patient's best interest to be on warfarin rather than a NOAC.  Ivette Barker CphT  [facility] Pharmacy Liaison  Liason Cell: [phone]

## 2018-07-30 NOTE — PLAN OF CARE
Problem: Patient Care Overview  Goal: Plan of Care/Patient Progress Review  Outcome: No Change  Heart rate elevated to 150 0 180's this am. Diltiazem drip restarted at 5mg/hr dropped down to 2.5mg/hr for soft BP's. Heparin drip infusing with coumadin bridging. NPO after midnight for RANDY/DCCV tomorrow. Consent signed. Brief episode of epigastric discomfort, relieved after passing flatus. EKG obtained, and MD aware. 1800 ml FR. Edema in legs gone. LS clear.

## 2018-07-30 NOTE — PROGRESS NOTES
[facility]  Cardiology Progress Note    Date of Service (when I saw the patient): 07/30/2018  Summary: Christ Servin is a 58 year old female who has not sought routine medical care who was admitted on 7/26/2018 with shortness of breath. On admission, she was found to have in atrial fibrillation with RVR as well as acute congestive heart failure.   Interval History   She is feeling much better since her admission. Orthopnea has resolved. S/p successful RANDY and cardioversion this morning. No CP, palpitations.   Assessment & Plan   1.  Acute congestive heart failure exacerbation. Likely driven by her atrial fibrillation with RVR. Echocardiogram shows LVEF of 45 - 50% with moderate dilated RV and moderately reduced RV systolic function, moderate dilated LA, severely dilated RA, small left to right shunt, severe MR and moderate to severe TR.  -  Diuresed with IV lasix drip on admission with significant improvement. Now on Torsemide 10 mg daily.   -  Suspect biventricular dysfunction is likely related to tachycardia, consider ischemic evaluation as an outpatient after she has improved.  2.  Atrial fibrillation with RVR.   -  S/p RANDY guided cardioversion today with successful restoration of NSR.   -  Continue Metoprolol for rate control.   -  Remains on IV heparin as well as warfarin for anticoagulation due to insurance issues. Needs to be on full anticoagulation prior to discharge due to recent cardioversion.  3.  Severe mitral regurgitation and moderate to severe TR.   -  Possibly functional. Will need repeat echocardiogram as an outpatient.   4.  Hyperthyroidism. TSH 4.78, FT4 1.65. Methimazole started.   5.  Suspicion for MARCELO. Outpatient sleep clinic referral.     Plan  1.  Continue wafarin and Metoprolol. BP on the low end so would hold lisinopril for now.   2.  Increase activity.  3.  Continue Torsemide.  4.  Possibly home tomorrow.   5.  CORE follow up has been arranged.    Deborah Yost,  WINSOME    Physical Exam   Temp: 98.1  F (36.7  C) Temp src: Oral BP: 90/68 Pulse: 82 Heart Rate: 132 Resp: 16 SpO2: 100 % O2 Device: None (Room air) Oxygen Delivery: 2 LPM  Vitals:    07/26/18 0331 07/27/18 1556 07/28/18 0500 07/29/18 0500   Weight: 80 kg (176 lb 5.9 oz) 71.7 kg (158 lb 1.6 oz) 69.1 kg (152 lb 6.4 oz) 69.3 kg (152 lb 11.2 oz)    07/30/18 0700   Weight: 69.8 kg (153 lb 12.8 oz)     Vital Signs with Ranges  Temp:  [97.9  F (36.6  C)-98.1  F (36.7  C)] 98.1  F (36.7  C)  Pulse:  [] 82  Heart Rate:  [] 132  Resp:  [16-18] 16  BP: ()/(37-93) 90/68  SpO2:  [94 %-100 %] 100 %  07/25 0700 - 07/30 0659  In: 1148.4 [P.O.:1070; I.V.:78.4]  Out: 8125 [Urine:8125]  Net: -6976.6  Constitutional: NAD.   Respiratory: Breath sounds with bibasilar rales USP up her posterior lung fields.   Cardiovascular: RRR, s1s2  GI: soft, BS+  Skin: warm, no rashes  Musculoskeletal: Moving all extremities. No peripheral edema.  Neurologic: Alert, oriented x 3  Neuropsychiatric: Normal affect     Data   Results for orders placed or performed during the hospital encounter of 07/26/18 (from the past 24 hour(s))   EKG 12-lead, tracing only   Result Value Ref Range    Interpretation ECG Click View Image link to view waveform and result    INR   Result Value Ref Range    INR 1.57 (H) 0.86 - 1.14   Magnesium   Result Value Ref Range    Magnesium 2.0 1.6 - 2.3 mg/dL   Potassium level   Result Value Ref Range    Potassium 5.6 (H) 3.4 - 5.3 mmol/L   INR   Result Value Ref Range    INR 1.70 (H) 0.86 - 1.14   Basic metabolic panel   Result Value Ref Range    Sodium 136 133 - 144 mmol/L    Potassium 5.3 3.4 - 5.3 mmol/L    Chloride 103 94 - 109 mmol/L    Carbon Dioxide 24 20 - 32 mmol/L    Anion Gap 9 3 - 14 mmol/L    Glucose 104 (H) 70 - 99 mg/dL    Urea Nitrogen 16 7 - 30 mg/dL    Creatinine 1.17 (H) 0.52 - 1.04 mg/dL    GFR Estimate 48 (L) >60 mL/min/1.7m2    GFR Estimate If Black 58 (L) >60 mL/min/1.7m2    Calcium 8.8  8.5 - 10.1 mg/dL   Heparin 10a Level   Result Value Ref Range    Heparin 10A Level 0.36 IU/mL   Magnesium   Result Value Ref Range    Magnesium 3.0 (H) 1.6 - 2.3 mg/dL   Transesophageal Echocardiogram    [facility]    [id number]  [facility]  [id number]  [id number]^FLOWER^JESSE^DAVINA [facility]  Echocardiography Laboratory  [address] [place] [address]        Name: MARÍA MAURICIO  MRN: [id number]  : 1960  Study Date: 2018 08:54 AM  Age: 58 yrs  Gender: Female  Patient Location: [facility]  Reason For Study: Afib  Ordering Physician: JESSE CRENSHAW  Referring Physician: DON FREDERICK  Performed By: Brittney Gamez     BSA: 1.7 m2  Height: 64 in  Weight: 152 lb  HR: 133  BP: 113/95 mmHg  _____________________________________________________________________________  __        Procedure  Complete RANDY Adult. 3D image acquisition, reconstruction, and real-time  interpretation was performed. RANDY Probe serial #[id number] was used during the  procedure.  _____________________________________________________________________________  __        Interpretation Summary     No thrombus is detected in the left atrial appendage.  Left ventricular systolic function is moderately reduced.  The visual ejection fraction is estimated at 30-35%.  There is moderate to mod-severe (2-3+) mitral regurgitation.  The study was technically difficult.  _____________________________________________________________________________  __        RANDY  Versed (2mg) was given intravenously. Fentanyl (50mcg) was given  intravenously. I determined this patient to be an appropriate candidate for  the planned sedation and procedure and have reassessed the patient immediately  prior to sedation and procedure. Total sedation time: 25 minutes of continuous  bedside 1:1 monitoring. Consent to the procedure was obtained prior to  sedation. Prior to the exam, the oral cavity was checked and no overcrowding  was noted. The transesophageal  probe was passed without difficulty. There were  no complications associated with this procedure.     Left Ventricle  The left ventricle is normal in size. There is normal left ventricular wall  thickness. Left ventricular systolic function is moderately reduced. The  visual ejection fraction is estimated at 30-35%. There is severe global  hypokinesia of the left ventricle.     Right Ventricle  The right ventricle is moderately dilated. Moderately decreased right  ventricular systolic function.     Atria  The left atrium is mild to moderately dilated. The right atrium is moderately  dilated. Left to right atrial shunt, small. A patent foramen ovale is  suspected. No thrombus is detected in the left atrial appendage.        Mitral Valve  The mitral valve leaflets are mildly thickened. There is no evidence of mitral  valve prolapse. There is moderate to mod-severe (2-3+) mitral regurgitation.  No systolic reversal on doppler of pulmonary veins. Two jets of MR are seen,  directed inferolaterally. The regurgitant volume of one of the jets is 20ml.     Tricuspid Valve  There is mild to moderate (1-2+) tricuspid regurgitation. The right  ventricular systolic pressure is approximated at 12.2 mmHg plus the right  atrial pressure.     Aortic Valve  The aortic valve is trileaflet. No aortic regurgitation is present. No aortic  stenosis is present.     Pulmonic Valve  The pulmonic valve is not well visualized. There is no pulmonic valvular  regurgitation.     Vessels  The aortic root is normal size.        Pericardial/Pleural  There is no pericardial effusion.     Rhythm  The rhythm was rapid atrial fibrillation.  _____________________________________________________________________________  __           Doppler Measurements & Calculations  TR max jim: 174.3 cm/sec  TR max P.2 mmHg           _____________________________________________________________________________  __           Report approved by: Cliff Harkins  07/30/2018 10:08 AM      EKG 12-lead, tracing only   Result Value Ref Range    Interpretation ECG Click View Image link to view waveform and result       Tele Af with RVR, now in NSR    Medications     diltiazem (CARDIZEM) infusion ADULT Stopped (07/30/18 0855)     HEParin 700 Units/hr (07/30/18 0020)     Warfarin Therapy Reminder         lisinopril  5 mg Oral BID     methimazole  5 mg Oral TID     metoprolol succinate  75 mg Oral BID     potassium chloride  40 mEq Oral BID     ranitidine  150 mg Oral BID     torsemide  10 mg Oral Daily

## 2018-07-30 NOTE — PROCEDURES
Direct Current Cardioversion Procedure Note    Indication:     Atrial Fibrillation    Procedure note:     After opening informed consent, direct current cardioversion was performed with 120 biphasic joules in a synchronized fashion using anterior and posterior pads. Manual pressure  was applied to the anterior pad and position of the pads were adjusted as needed to improve success rate. There were no apparent complications. Anesthesia was provided by the anesthetist.     Results:     Successful cardioversion using 120 biphasic joules in a synchronized fashion.  Patient to continue anticoagulation for at least 4 weeks or longer as recommended by the primary referring cardiologist.

## 2018-07-30 NOTE — PLAN OF CARE
Problem: Patient Care Overview  Goal: Plan of Care/Patient Progress Review  Outcome: No Change      NPO overnight for RANDY/cardioversion. Heparin bridge infusing at 7 mL/hr. Diltiazem infusing at 2.5 mL/hr. Hypotensive, but above parameters. Received 2g Magnesium replacement overnight. Recheck in am. Denies pain, dyspnea, nausea. Up independently. Tele: afib with CVR. Rates 80s-90s.

## 2018-07-31 ENCOUNTER — APPOINTMENT (OUTPATIENT)
Dept: OCCUPATIONAL THERAPY | Facility: CLINIC | Age: 58
End: 2018-07-31
Attending: PHYSICIAN ASSISTANT
Payer: MEDICAID

## 2018-07-31 LAB
ANION GAP SERPL CALCULATED.3IONS-SCNC: 6 MMOL/L (ref 3–14)
BUN SERPL-MCNC: 18 MG/DL (ref 7–30)
CALCIUM SERPL-MCNC: 9.5 MG/DL (ref 8.5–10.1)
CHLORIDE SERPL-SCNC: 103 MMOL/L (ref 94–109)
CO2 SERPL-SCNC: 26 MMOL/L (ref 20–32)
CREAT SERPL-MCNC: 1.4 MG/DL (ref 0.52–1.04)
GFR SERPL CREATININE-BSD FRML MDRD: 39 ML/MIN/1.7M2
GLUCOSE SERPL-MCNC: 100 MG/DL (ref 70–99)
INR PPP: 1.61 (ref 0.86–1.14)
LMWH PPP CHRO-ACNC: 0.14 IU/ML
LMWH PPP CHRO-ACNC: 0.16 IU/ML
POTASSIUM SERPL-SCNC: 5.6 MMOL/L (ref 3.4–5.3)
SODIUM SERPL-SCNC: 135 MMOL/L (ref 133–144)

## 2018-07-31 PROCEDURE — 25000132 ZZH RX MED GY IP 250 OP 250 PS 637: Performed by: INTERNAL MEDICINE

## 2018-07-31 PROCEDURE — 25000132 ZZH RX MED GY IP 250 OP 250 PS 637: Performed by: HOSPITALIST

## 2018-07-31 PROCEDURE — 99207 ZZC CDG-MDM COMPONENT: MEETS MODERATE - UP CODED: CPT | Performed by: PHYSICIAN ASSISTANT

## 2018-07-31 PROCEDURE — 97535 SELF CARE MNGMENT TRAINING: CPT | Mod: GO | Performed by: OCCUPATIONAL THERAPIST

## 2018-07-31 PROCEDURE — 85520 HEPARIN ASSAY: CPT | Performed by: HOSPITALIST

## 2018-07-31 PROCEDURE — 97165 OT EVAL LOW COMPLEX 30 MIN: CPT | Mod: GO | Performed by: OCCUPATIONAL THERAPIST

## 2018-07-31 PROCEDURE — 36415 COLL VENOUS BLD VENIPUNCTURE: CPT | Performed by: INTERNAL MEDICINE

## 2018-07-31 PROCEDURE — 36415 COLL VENOUS BLD VENIPUNCTURE: CPT | Performed by: PHYSICIAN ASSISTANT

## 2018-07-31 PROCEDURE — 85520 HEPARIN ASSAY: CPT | Performed by: PHYSICIAN ASSISTANT

## 2018-07-31 PROCEDURE — 99232 SBSQ HOSP IP/OBS MODERATE 35: CPT | Performed by: INTERNAL MEDICINE

## 2018-07-31 PROCEDURE — 21000001 ZZH R&B HEART CARE

## 2018-07-31 PROCEDURE — 85610 PROTHROMBIN TIME: CPT | Performed by: INTERNAL MEDICINE

## 2018-07-31 PROCEDURE — 85520 HEPARIN ASSAY: CPT | Performed by: INTERNAL MEDICINE

## 2018-07-31 PROCEDURE — 97110 THERAPEUTIC EXERCISES: CPT | Mod: GO | Performed by: OCCUPATIONAL THERAPIST

## 2018-07-31 PROCEDURE — 99233 SBSQ HOSP IP/OBS HIGH 50: CPT | Performed by: PHYSICIAN ASSISTANT

## 2018-07-31 PROCEDURE — 80048 BASIC METABOLIC PNL TOTAL CA: CPT | Performed by: INTERNAL MEDICINE

## 2018-07-31 PROCEDURE — 40000133 ZZH STATISTIC OT WARD VISIT: Performed by: OCCUPATIONAL THERAPIST

## 2018-07-31 RX ORDER — FUROSEMIDE 40 MG
40 TABLET ORAL DAILY
Status: DISCONTINUED | OUTPATIENT
Start: 2018-08-01 | End: 2018-07-31

## 2018-07-31 RX ORDER — METOPROLOL TARTRATE 50 MG
50 TABLET ORAL 2 TIMES DAILY
Status: DISCONTINUED | OUTPATIENT
Start: 2018-07-31 | End: 2018-08-03 | Stop reason: HOSPADM

## 2018-07-31 RX ORDER — FUROSEMIDE 20 MG
20 TABLET ORAL DAILY
Status: DISCONTINUED | OUTPATIENT
Start: 2018-08-01 | End: 2018-08-01

## 2018-07-31 RX ORDER — POTASSIUM CHLORIDE 1500 MG/1
40 TABLET, EXTENDED RELEASE ORAL DAILY
Status: DISCONTINUED | OUTPATIENT
Start: 2018-08-01 | End: 2018-07-31

## 2018-07-31 RX ORDER — WARFARIN SODIUM 7.5 MG/1
7.5 TABLET ORAL
Status: DISCONTINUED | OUTPATIENT
Start: 2018-07-31 | End: 2018-07-31

## 2018-07-31 RX ADMIN — POTASSIUM CHLORIDE 40 MEQ: 1500 TABLET, EXTENDED RELEASE ORAL at 09:09

## 2018-07-31 RX ADMIN — METHIMAZOLE 5 MG: 5 TABLET ORAL at 21:06

## 2018-07-31 RX ADMIN — METHIMAZOLE 5 MG: 5 TABLET ORAL at 17:28

## 2018-07-31 RX ADMIN — RANITIDINE 150 MG: 150 TABLET ORAL at 09:09

## 2018-07-31 RX ADMIN — METHIMAZOLE 5 MG: 5 TABLET ORAL at 09:10

## 2018-07-31 RX ADMIN — TORSEMIDE 10 MG: 10 TABLET ORAL at 09:10

## 2018-07-31 RX ADMIN — METOPROLOL SUCCINATE 75 MG: 50 TABLET, EXTENDED RELEASE ORAL at 09:09

## 2018-07-31 RX ADMIN — RIVAROXABAN 20 MG: 10 TABLET, FILM COATED ORAL at 17:28

## 2018-07-31 ASSESSMENT — ACTIVITIES OF DAILY LIVING (ADL)
ADLS_ACUITY_SCORE: 9

## 2018-07-31 NOTE — PLAN OF CARE
Problem: Patient Care Overview  Goal: Plan of Care/Patient Progress Review  Discharge Planner OT   Patient plan for discharge: home  Current status: Eval and treatment only. Pt lives in a mobile home with her SO and I with all ADL/IADL's, including mowing lawn, cooking, cleaning and laundry, does not drive. Pt admitted due to SOB, found to be in A-fib and CHF exacerbation due to A-fib. Pt s/p RANDY and cardioversion. Pt educated in CHF self care concepts, pt not aware of CHF diagnosis. Pt ambulated approx 225 ft and completed TDM at 1 mph for 8 mins and completed 5 stairs. Pt denies any fatigue or SOB and CV response stable, see vital sign flow sheet for details. Pt appears motivated to continue walking at home.   Barriers to return to prior living situation: none with SO support as needed with IADL's ie mowing lawn, etc  Recommendations for discharge: home with cont'd walking and recommended OP WEL program, unsure if pt interested.   Rationale for recommendations: Continue with walking program. No further OT/CR needs warranted, pt met goals.     Occupational Therapy Discharge Summary    Reason for therapy discharge:    All goals and outcomes met, no further needs identified.    Progress towards therapy goal(s). See goals on Care Plan in [facility] electronic health record for goal details.  Goals met    Therapy recommendation(s):    home with cont'd walking/exercise and following CHF self care concepts, low sodium diet and weighing self daily. Unsure if pt intersted in OP WEL program.            Entered by: Roya Gary 07/31/2018 9:05 AM

## 2018-07-31 NOTE — PROGRESS NOTES
Received call from JOAQUINA Marks care coordinator at [facility] yesterday. Pt has emergency medical assistance only and Selina inquiring about cost of CORE visit out of pocket. Messaged coding department, initial post hospital CORE visit is usually between $295-397. Returned call to Sunitha Almeida today, no answer. LVM with information above. Left call back number for questions.     Flower Whalen RN  CORE Clinic RN Care Coordinator  [facility]  [phone]

## 2018-07-31 NOTE — PROGRESS NOTES
[facility]  Cardiology Progress Note    Date of Service (when I saw the patient): 07/31/2018  Summary: Service (when I saw the patient): 07/30/2018  Summary: Christ Servin is a 58 year old female who has not sought routine medical care who was admitted on 7/26/2018 with shortness of breath. On admission, she was found to have in atrial fibrillation with RVR as well as acute congestive heart failure.   Interval History   Remains in NSR this morning. She worked with cardiac rehab this morning without difficulty. Dyspnea improved. No chest pain, palpitations. We discussed her diagnosis of atrial fibrillation and cardiomyopathy.   Assessment & Plan   1.  Acute congestive heart failure exacerbation. Likely driven by her atrial fibrillation with RVR. Echocardiogram shows LVEF of 45 - 50% with moderate dilated RV and moderately reduced RV systolic function, moderate dilated LA, severely dilated RA, small left to right shunt, severe MR and moderate to severe TR.  -  Diuresed with IV lasix drip on admission with significant improvement. Now on Torsemide 10 mg daily.   -  Suspect biventricular dysfunction is likely related to tachycardia, but consider ischemic evaluation as an outpatient after she has recovered.  -  Metoprolol and lisinopril started. Stopped lisinopril 7/30 due to hypotension.  2.  Atrial fibrillation with RVR. S/p RANDY guided cardioversion 7/30 with successful restoration of NSR.   -  Continue Metoprolol for rate control.   -  Remains on IV heparin as well as warfarin for anticoagulation due to insurance issues. Needs to be on full anticoagulation prior to discharge for at least 4 weeks due to recent cardioversion. Either needs to stay inpatient until INR therapeutic or could consider treating with NOAC for a few weeks.   3.  Severe mitral regurgitation and moderate to severe TR.   -  Possibly functional. Will need repeat echocardiogram as an outpatient.   4.  Hyperthyroidism. TSH 4.78, FT4  1.65. Methimazole started. Outpatient endocrinology follow up recommended.   5.  Suspicion for MARCELO. Outpatient sleep clinic referral once she has insurance coverage.  6.  Hyperkalemia.     Plan:  1.  She has no insurance coverage and therefore will need to pay for medications and visits out of pocket. CORE clinic follow up arranged although this would be an out of pocket cost. Metoprolol and warfarin should be affordable. Could consider switching from Torsemide to Lasix which may be more affordable if needed.    2.  Continue warfarin/heparin.    3.  Decrease K supplement. Check BMP today.     Deborah Yost PA-C    Physical Exam   Temp: 97.7  F (36.5  C) Temp src: Oral BP: 109/64 Pulse: 79 Heart Rate: 80 Resp: 14 SpO2: 96 % O2 Device: None (Room air) Oxygen Delivery: 2 LPM  Vitals:    07/26/18 0331 07/27/18 1556 07/28/18 0500 07/29/18 0500   Weight: 80 kg (176 lb 5.9 oz) 71.7 kg (158 lb 1.6 oz) 69.1 kg (152 lb 6.4 oz) 69.3 kg (152 lb 11.2 oz)    07/30/18 0700 07/31/18 0550   Weight: 69.8 kg (153 lb 12.8 oz) 69.3 kg (152 lb 11.2 oz)     Vital Signs with Ranges  Temp:  [97.3  F (36.3  C)-97.7  F (36.5  C)] 97.7  F (36.5  C)  Pulse:  [] 79  Heart Rate:  [] 80  Resp:  [14-18] 14  BP: ()/(51-93) 109/64  SpO2:  [96 %-100 %] 96 %  07/26 0700 - 07/31 0659  In: 1688.4 [P.O.:1610; I.V.:78.4]  Out: 9125 [Urine:9125]  Net: -7436.6  Constitutional: NAD.   Respiratory: breath sounds with bibasilar crackles, otherwise clear.  Cardiovascular: RRR, s1s2, no murmur. Unable to assess JVP  GI: soft, BS+  Skin: warm, no rashes  Musculoskeletal: Moving all extremities. No edema   Neurologic: Alert, oriented x 3  Neuropsychiatric: Normal affect     Data     Recent Labs  Lab 07/31/18  0551 07/30/18  0626 07/30/18  0035 07/29/18  0544 07/28/18  0540  07/27/18  0516 07/26/18  1308 07/26/18  0946 07/26/18  0538   WBC  --   --   --   --   --   --  7.3  --   --  8.6   HGB  --   --   --   --   --   --  13.9  --   --  14.8    MCV  --   --   --   --   --   --  93  --   --  94   PLT  --   --   --   --   --   --  161  --   --  171   INR 1.61* 1.70* 1.57* 1.10 1.07  < >  --   --   --   --    NA  --  136  --  137 139  --  144  --   --  143   POTASSIUM  --  5.3 5.6* 4.6 3.4  --  3.5  --   --  4.7   CHLORIDE  --  103  --  99 94  --  104  --   --  110*   CO2  --  24  --  30 34*  --  32  --   --  22   BUN  --  16  --  25 18  --  18  --   --  16   CR  --  1.17*  --  1.40* 1.28*  --  1.33*  --   --  1.20*   ANIONGAP  --  9  --  8 11  --  8  --   --  11   ANKUSH  --  8.8  --  8.6 9.3  --  8.9  --   --  8.6   GLC  --  104*  --  112* 104*  --  116*  --   --  116*   ALBUMIN  --   --   --   --   --   --  3.9  --   --  3.6   PROTTOTAL  --   --   --   --   --   --  6.9  --   --  6.7*   BILITOTAL  --   --   --   --   --   --  1.8*  --   --  1.5*   ALKPHOS  --   --   --   --   --   --  103  --   --  101   ALT  --   --   --   --   --   --  48  --   --  51*   AST  --   --   --   --   --   --  31  --   --  41   TROPI  --   --   --   --   --   --   --  <0.015 <0.015 <0.015   < > = values in this interval not displayed.    Echocardiogram 7/26/18:  Interpretation Summary  The left ventricle is normal in size. The visual ejection fraction is estimated at 45-50%. There is moderate global hypokinesia of the left ventricle. The right ventricle is moderately dilated. Moderately decreased right ventricular systolic function (distal RV severe hypokinesis). The left atrium is moderately dilated. The right atrium is moderate to severely dilated. Left to right atrial shunt, small There is severe (4+) mitral regurgitation, pathology unclear, probably functional. There is mod-severe to severe (3-4+) tricuspid regurgitation. The right ventricular systolic pressure is approximated at 38mmHg plus the right atrial pressure.  Dilated IVC (>2.5cm) with no respiratory collapse; right atrial pressure is estimated at >20mmHg. Small pericardial effusion Moderate bilateral pleural  effusion The rhythm was rapid atrial fibrillation. There is no comparison study available.    RANDY 7/30/18:  Interpretation Summary  No thrombus is detected in the left atrial appendage.  Left ventricular systolic function is moderately reduced.  The visual ejection fraction is estimated at 30-35%.  There is moderate to mod-severe (2-3+) mitral regurgitation.  The study was technically difficult.    Tele NSR     Medications     diltiazem (CARDIZEM) infusion ADULT Stopped (07/30/18 0855)     HEParin 400 Units/hr (07/30/18 2051)     Warfarin Therapy Reminder         methimazole  5 mg Oral TID     metoprolol succinate  75 mg Oral BID     potassium chloride  40 mEq Oral BID     ranitidine  150 mg Oral BID     torsemide  10 mg Oral Daily

## 2018-07-31 NOTE — PLAN OF CARE
Problem: Patient Care Overview  Goal: Plan of Care/Patient Progress Review  Outcome: No Change  A/O x 4, vss, a-febrile, denies pain, up independently to the bathroom, patient  Is on heparin at 400 units next heparin 10a is tomorrow, INR sub therapeutic today 1.61, so patient will stay another day, possibly DC to home tomorrow, patient is bridging coumadin with heparin.

## 2018-07-31 NOTE — PROGRESS NOTES
07/31/18 0829   Quick Adds   Type of Visit Initial Occupational Therapy Evaluation   Living Environment   Lives With significant other   Living Arrangements mobile home   Home Accessibility stairs to enter home   Number of Stairs to Enter Home 3   Transportation Available family or friend will provide   Self-Care   Regular Exercise yes   Activity/Exercise Type walking   Exercise Amount/Frequency (approx 1 mile/day)   Functional Level Prior   Ambulation 0-->independent   Transferring 0-->independent   Toileting 0-->independent   Bathing 0-->independent   Dressing 0-->independent   Eating 0-->independent   Communication 0-->understands/communicates without difficulty   Swallowing 0-->swallows foods/liquids without difficulty   Cognition 0 - no cognition issues reported   Fall history within last six months no   Prior Functional Level Comment Pt I with ADL/IADL's mows lawn, cleaning, laundry, etc.    General Information   Onset of Illness/Injury or Date of Surgery - Date 07/26/18   Referring Physician Triny Huffman PA-C   Patient/Family Goals Statement return home   Additional Occupational Profile Info/Pertinent History of Current Problem Christ Servin is a 58 year old female who has not sought routine medical care who was admitted on 7/26/2018 with shortness of breath. On admission, she was found to have in atrial fibrillation with RVR as well as acute congestive heart failure.  S/p successful RANDY and cardioversion  and in NSR   Precautions/Limitations no known precautions/limitations   Cognitive Status Examination   Orientation orientation to person, place and time   Level of Consciousness alert   Able to Follow Commands WNL/WFL   Personal Safety (Cognitive) WNL/WFL   Memory intact   Attention No deficits were identified   Organization/Problem Solving No deficits were identified   Executive Function No deficits were identified   Sensory Examination   Sensory Quick Adds No deficits were identified   Pain  "Assessment   Patient Currently in Pain No   Transfer Skills   Transfer Comments Pt I with ADL's and functional mobility.    Instrumental Activities of Daily Living (IADL)   Previous Responsibilities meal prep;housekeeping;laundry;yardwork;shopping;medication management;finances   Activities of Daily Living Analysis   Impairments Contributing to Impaired Activities of Daily Living (decreased activity tolerance)   General Therapy Interventions   Planned Therapy Interventions home program guidelines;progressive activity/exercise   Clinical Impression   Criteria for Skilled Therapeutic Interventions Met yes, treatment indicated   OT Diagnosis decreased IADL   Influenced by the following impairments impaired Activity tolerance   Assessment of Occupational Performance 1-3 Performance Deficits   Identified Performance Deficits impaired heavier IADl's ie mowing lawn, heavier cleaning as needed   Clinical Decision Making (Complexity) Low complexity   Therapy Frequency (Eval and treatment only)   Anticipated Discharge Disposition Home;Home with Outpatient Therapy  (home with SO A with heavier IADls as needed and OP WEL)   Risks and Benefits of Treatment have been explained. Yes   Patient, Family & other staff in agreement with plan of care Yes   [facility] AM-PAC  \"6 Clicks\" Daily Activity Inpatient Short Form   1. Putting on and taking off regular lower body clothing? 4 - None   2. Bathing (including washing, rinsing, drying)? 4 - None   3. Toileting, which includes using toilet, bedpan or urinal? 4 - None   4. Putting on and taking off regular upper body clothing? 4 - None   5. Taking care of personal grooming such as brushing teeth? 4 - None   6. Eating meals? 4 - None   Daily Activity Raw Score (Score out of 24.Lower scores equate to lower levels of function) 24   Total Evaluation Time   Total Evaluation Time (Minutes) 10     "

## 2018-07-31 NOTE — PROGRESS NOTES
SPIRITUAL HEALTH SERVICES Progress Note  [facility]    Visited pt per length of stay to introduce SH services. Pt declined, stating that she was hoping to go home today. Pt's significant other and a friend have been by to support her.     SH has no plans to follow but is available upon request.      Katy Davis  Chaplain Resident  Pager: [phone]  Office: [phone]

## 2018-07-31 NOTE — PROGRESS NOTES
A/O, VSS on RA. LS dim with crackles in bases. Trace edema in bilat feet. Fluid restriction maintained. Heparin gtt continued at 400units/hr. Denies pain. Tele NSR

## 2018-07-31 NOTE — PROGRESS NOTES
[facility]    Hospitalist Progress Note    Date of Service (when I saw the patient): 07/31/2018    Assessment & Plan   Christ Servin is a 58 year old female with a past medical history of GERD who was admitted on 7/26/2018 with shortness of breath and afib with RVR.     Atrial fibrillation with rapid ventricular rate:   New diagnosis for the patient and associated with shortness of breath, see acute CHF exacerbation below.  Notably she does have evidence of hyperthyroidism which could be driving some of this.  She was initially given metoprolol and then started on diltiazem and a heparin drip.   - Attempted up titration of BB without improvement in rates  - S/p successful RANDY with cardioversion 7/30  - Will continue BB for rate control, BPs soft this am. Dicussed with Cards. Will change from succinate to tartrate 7/31 due cost and continue to monitor  - Continues on iv Heparin and initiated on Warfarin due to lack of insurance coverage. INR 1.61. Warfarin per pharmacy  - See below for discussion about hyperthyroidism  - Correct electrolytes as needed.  - Will need to determine plan for INR draws and follow up. She has no insurance and has to pay out of pocket for all office visits.      Acute congestive heart failure exacerbation:    Noted that abd CT shows ascites which is likely from CHF.  Suspect this is related to the atrial fibrillation.  Echo shows preserved EF of 45-50% with moderate global hypokinesia, decreased RV function, dilated atria, dilated IVC, pulmonary hypertension and mod/severe TR with a small pericardial effusion.  RANDY with EF 30-35%  -Cardiology following  -Treated with a lasix drip - pt now appears euvolemic and was started on Torsemide 20 mg po daily 7/28 and decreased to 10 mg/d 7/29. Changed to Lasix 40 mg/d 7/31 due to cost  - Started on Lisinopril 5 mg bid. BPs soft. D/c'd 7/30. Can consider addition as outpatient.      Acute kidney injury, stage II:   Patient with a  creatinine of 1.20 on admission, elevated from baseline of approximately 1.04.  -- Cr trend 1.40--1.17--1.61  - Follow bmp in am.   - Avoid nephrotoxins.      Hyperkalemia  - K 5.6. KCL d/c'd  - Repeat BMP in am    Abdominal ascites with slight bili elevation:   Patient with CT Abd completed noting ascites. U/s ruq completed 7/23 showing prior cholecystectomy, and no evidence for fatty infiltration. ALT elevated on 7/1, however, improving on 7/26/18.  Wonder if this is all due to volume overload with CHF  - repeat LFTs improving.   - diuresis as above       Hyperthyroidism:   Patient with a TSH of 4.78, and a free T4 level of 1.65.  Repeat on 7/27 shows TSH of 3.5 and FT4 of 1.78.  Thyroid US shows a 7mm nodule in mid lateral left lobe.  - Started methimazole as patient is symptomatic with afib. Per outpatient pharmacy cost $43/month and patient notes this is affordable  - will need outpatient endocrine follow up to discuss further treatment and work up      GERD: Stable.  - Continue prior to admission ranitidine.    DVT Prophylaxis: Warfarin  Code Status: Full Code    # Pain Assessment:  Current Pain Score 7/31/2018   Patient currently in pain? sleeping: patient not able to self report   Pain score (0-10) -   Pain location -   Pain descriptors -   Christ guajardo pain level was assessed and she currently denies pain.        Disposition: Awaiting therapeutic INR. Adjusting BB and diuretic. Hopeful for discharge tomorrow if INR is therapeutic       Triny Huffman       Interval History    Called [facility] to determine cost of Metoprolol XL, Torsemide and Tapazole   Discussed with Cardiology PA, will change Metoprolol to tartrate and torsemide to lasix    Doing well. Breathing still improving. Denies palpitations    Discussed need for close follow up and the financial implications. Discussed expected cost of medications.     -Data reviewed today: I reviewed all new labs and imaging results over the last 24 hours.  I personally reviewed no images or EKG's today.    Physical Exam   Temp: 98.7  F (37.1  C) Temp src: Oral BP: 90/60 Pulse: 72 Heart Rate: 80 Resp: 12 SpO2: 96 % O2 Device: None (Room air)    Vitals:    07/29/18 0500 07/30/18 0700 07/31/18 0550   Weight: 69.3 kg (152 lb 11.2 oz) 69.8 kg (153 lb 12.8 oz) 69.3 kg (152 lb 11.2 oz)     Vital Signs with Ranges  Temp:  [97.3  F (36.3  C)-98.7  F (37.1  C)] 98.7  F (37.1  C)  Pulse:  [72-79] 72  Heart Rate:  [72-87] 80  Resp:  [12-18] 12  BP: ()/(57-83) 90/60  SpO2:  [96 %-99 %] 96 %  I/O last 3 completed shifts:  In: 540 [P.O.:540]  Out: 1000 [Urine:1000]    Gen: Patient in no acute distress.  Appears comfortable.  Heart:  S1S2+, RRR, No murmurs.  Lungs:  Clear to auscultation at apices, no wheezing. Crackles bilateral bases.   Abdomen:  Soft, non tender, non distended, bowel sounds positive.  Extremities:  No edema.    Medications     diltiazem (CARDIZEM) infusion ADULT Stopped (07/30/18 0855)     HEParin 400 Units/hr (07/30/18 2051)     Warfarin Therapy Reminder         [START ON 8/1/2018] furosemide  40 mg Oral Daily     methimazole  5 mg Oral TID     metoprolol tartrate  75 mg Oral BID     ranitidine  150 mg Oral BID       Data     Recent Labs  Lab 07/31/18  0551 07/30/18  0626 07/30/18  0035 07/29/18  0544  07/27/18  0516 07/26/18  1308 07/26/18  0946 07/26/18  0538   WBC  --   --   --   --   --  7.3  --   --  8.6   HGB  --   --   --   --   --  13.9  --   --  14.8   MCV  --   --   --   --   --  93  --   --  94   PLT  --   --   --   --   --  161  --   --  171   INR 1.61* 1.70* 1.57* 1.10  < >  --   --   --   --     136  --  137  < > 144  --   --  143   POTASSIUM 5.6* 5.3 5.6* 4.6  < > 3.5  --   --  4.7   CHLORIDE 103 103  --  99  < > 104  --   --  110*   CO2 26 24  --  30  < > 32  --   --  22   BUN 18 16  --  25  < > 18  --   --  16   CR 1.40* 1.17*  --  1.40*  < > 1.33*  --   --  1.20*   ANIONGAP 6 9  --  8  < > 8  --   --  11   ANKUSH 9.5 8.8  --  8.6  < >  8.9  --   --  8.6   * 104*  --  112*  < > 116*  --   --  116*   ALBUMIN  --   --   --   --   --  3.9  --   --  3.6   PROTTOTAL  --   --   --   --   --  6.9  --   --  6.7*   BILITOTAL  --   --   --   --   --  1.8*  --   --  1.5*   ALKPHOS  --   --   --   --   --  103  --   --  101   ALT  --   --   --   --   --  48  --   --  51*   AST  --   --   --   --   --  31  --   --  41   LIPASE  --   --   --   --   --   --   --   --  139   TROPI  --   --   --   --   --   --  <0.015 <0.015 <0.015   < > = values in this interval not displayed.    No results found for this or any previous visit (from the past 24 hour(s)).

## 2018-07-31 NOTE — PLAN OF CARE
Problem: Cardiac: Heart Failure (Adult)  Goal: Signs and Symptoms of Listed Potential Problems Will be Absent, Minimized or Managed (Cardiac: Heart Failure)  Signs and symptoms of listed potential problems will be absent, minimized or managed by discharge/transition of care (reference Cardiac: Heart Failure (Adult) CPG).   Outcome: Improving  Rhythm has remained sinus, BP soft, but pt asymptomatic (low BP) at baseline. Heparin drip infusing, INR 1.7 today, recheck tomorrow.

## 2018-08-01 LAB
ANION GAP SERPL CALCULATED.3IONS-SCNC: 9 MMOL/L (ref 3–14)
BUN SERPL-MCNC: 23 MG/DL (ref 7–30)
CALCIUM SERPL-MCNC: 9 MG/DL (ref 8.5–10.1)
CHLORIDE SERPL-SCNC: 101 MMOL/L (ref 94–109)
CO2 SERPL-SCNC: 22 MMOL/L (ref 20–32)
CREAT SERPL-MCNC: 1.11 MG/DL (ref 0.52–1.04)
GFR SERPL CREATININE-BSD FRML MDRD: 51 ML/MIN/1.7M2
GLUCOSE SERPL-MCNC: 104 MG/DL (ref 70–99)
INTERPRETATION ECG - MUSE: NORMAL
INTERPRETATION ECG - MUSE: NORMAL
POTASSIUM SERPL-SCNC: 4.4 MMOL/L (ref 3.4–5.3)
POTASSIUM SERPL-SCNC: 5.6 MMOL/L (ref 3.4–5.3)
SODIUM SERPL-SCNC: 132 MMOL/L (ref 133–144)

## 2018-08-01 PROCEDURE — 21000001 ZZH R&B HEART CARE

## 2018-08-01 PROCEDURE — 25000132 ZZH RX MED GY IP 250 OP 250 PS 637: Performed by: INTERNAL MEDICINE

## 2018-08-01 PROCEDURE — 84132 ASSAY OF SERUM POTASSIUM: CPT | Performed by: PHYSICIAN ASSISTANT

## 2018-08-01 PROCEDURE — 99233 SBSQ HOSP IP/OBS HIGH 50: CPT | Performed by: HOSPITALIST

## 2018-08-01 PROCEDURE — 93005 ELECTROCARDIOGRAM TRACING: CPT

## 2018-08-01 PROCEDURE — 25000128 H RX IP 250 OP 636: Performed by: PHYSICIAN ASSISTANT

## 2018-08-01 PROCEDURE — 83735 ASSAY OF MAGNESIUM: CPT | Performed by: INTERNAL MEDICINE

## 2018-08-01 PROCEDURE — 80048 BASIC METABOLIC PNL TOTAL CA: CPT | Performed by: PHYSICIAN ASSISTANT

## 2018-08-01 PROCEDURE — 36415 COLL VENOUS BLD VENIPUNCTURE: CPT | Performed by: PHYSICIAN ASSISTANT

## 2018-08-01 PROCEDURE — 99222 1ST HOSP IP/OBS MODERATE 55: CPT | Performed by: INTERNAL MEDICINE

## 2018-08-01 PROCEDURE — 25000128 H RX IP 250 OP 636: Performed by: HOSPITALIST

## 2018-08-01 PROCEDURE — 25000125 ZZHC RX 250: Performed by: HOSPITALIST

## 2018-08-01 PROCEDURE — 93010 ELECTROCARDIOGRAM REPORT: CPT | Performed by: INTERNAL MEDICINE

## 2018-08-01 PROCEDURE — 25000132 ZZH RX MED GY IP 250 OP 250 PS 637: Performed by: HOSPITALIST

## 2018-08-01 RX ORDER — SODIUM CHLORIDE 9 MG/ML
INJECTION, SOLUTION INTRAVENOUS CONTINUOUS
Status: DISCONTINUED | OUTPATIENT
Start: 2018-08-02 | End: 2018-08-02

## 2018-08-01 RX ORDER — POTASSIUM CHLORIDE 1500 MG/1
20 TABLET, EXTENDED RELEASE ORAL
Status: DISCONTINUED | OUTPATIENT
Start: 2018-08-01 | End: 2018-08-03 | Stop reason: HOSPADM

## 2018-08-01 RX ORDER — POTASSIUM CHLORIDE 1500 MG/1
40 TABLET, EXTENDED RELEASE ORAL
Status: DISCONTINUED | OUTPATIENT
Start: 2018-08-01 | End: 2018-08-03 | Stop reason: HOSPADM

## 2018-08-01 RX ORDER — FUROSEMIDE 10 MG/ML
40 INJECTION INTRAMUSCULAR; INTRAVENOUS ONCE
Status: COMPLETED | OUTPATIENT
Start: 2018-08-01 | End: 2018-08-01

## 2018-08-01 RX ORDER — MAGNESIUM SULFATE HEPTAHYDRATE 40 MG/ML
2 INJECTION, SOLUTION INTRAVENOUS
Status: DISCONTINUED | OUTPATIENT
Start: 2018-08-01 | End: 2018-08-03 | Stop reason: HOSPADM

## 2018-08-01 RX ADMIN — RANITIDINE 150 MG: 150 TABLET ORAL at 09:32

## 2018-08-01 RX ADMIN — METHIMAZOLE 5 MG: 5 TABLET ORAL at 21:33

## 2018-08-01 RX ADMIN — METHIMAZOLE 5 MG: 5 TABLET ORAL at 16:12

## 2018-08-01 RX ADMIN — METHIMAZOLE 5 MG: 5 TABLET ORAL at 09:32

## 2018-08-01 RX ADMIN — FLECAINIDE ACETATE 150 MG: 100 TABLET ORAL at 10:45

## 2018-08-01 RX ADMIN — RIVAROXABAN 20 MG: 10 TABLET, FILM COATED ORAL at 16:12

## 2018-08-01 RX ADMIN — FLECAINIDE ACETATE 150 MG: 100 TABLET ORAL at 21:33

## 2018-08-01 RX ADMIN — METOPROLOL TARTRATE 50 MG: 50 TABLET ORAL at 09:32

## 2018-08-01 RX ADMIN — SODIUM CHLORIDE: 9 INJECTION, SOLUTION INTRAVENOUS at 23:54

## 2018-08-01 RX ADMIN — FUROSEMIDE 40 MG: 10 INJECTION, SOLUTION INTRAVENOUS at 09:46

## 2018-08-01 RX ADMIN — SODIUM CHLORIDE 500 ML: 9 INJECTION, SOLUTION INTRAVENOUS at 09:46

## 2018-08-01 RX ADMIN — METOPROLOL TARTRATE 2.5 MG: 5 INJECTION, SOLUTION INTRAVENOUS at 18:59

## 2018-08-01 ASSESSMENT — ACTIVITIES OF DAILY LIVING (ADL)
ADLS_ACUITY_SCORE: 9

## 2018-08-01 NOTE — PLAN OF CARE
Problem: Patient Care Overview  Goal: Plan of Care/Patient Progress Review  Outcome: Declining  Pt A/Ox4, continued SR early AM after DCCV yesterday but went into NELY just after 0915 (170-199), EKG, MD notified. C/O sob about 1000, oxygen 3L/M nc with relief. Received orders to start Flecainide. HR down to 110s-130s. Plan possible DCCV tomorrow. Pt appreciative of cares.

## 2018-08-01 NOTE — CONSULTS
Consult Date:  08/01/2018      REQUESTING PHYSICIAN:  Ton Enrique MD       REASON FOR CONSULTATION:  Recurrent atrial fibrillation.      HISTORY OF PRESENT ILLNESS:  Ms. Servin is a 58-year-old [place] who presented to the ER with abdominal pain on 7/26/2018.  She was found to be in persistent atrial fibrillation with rapid ventricular rate.  The patient was seen by Cardiology on the same day of admission.  She underwent RANDY and DC cardioversion of atrial fibrillation yesterday.  The transthoracic echocardiography during rapid atrial fibrillation showed mild LV ejection fraction reduction.  Although the cardioversion was successful acutely, the patient reverted back to atrial fibrillation with rapid ventricular rate this morning.  Symptomatically, she feels palpitation, shortness of breath and fatigue during atrial fibrillation.       During the same hospitalization, she was found to have mildly elevated free T4, normal free T3, and mildly elevated TSH.  She has been diagnosed to have hyperthyroidism and currently is treated with Tapazole 5 mg p.o. t.i.d.  Her last TSH was normal.      FAMILY HISTORY:  Noncontributory to atrial fibrillation.      SOCIAL HISTORY:  She does not smoke or abuse alcohol.  She is from the [facility], with no job at the present time.  Her immigration status is unknown to me.  It is my understanding she has no medical insurance.      REVIEW OF SYSTEMS:  Comprehensive review of the systems showed no fever, cough or diarrhea.      CURRENT MEDICATIONS:   1.  IV diltiazem.    2.  Tapazole 5 mg p.o. t.i.d.   3.  Lopressor 50 mg p.o. b.i.d.   4.  Zantac 150 mg p.o. daily.   5.  Xarelto 20 mg p.o. daily.      ALLERGIES:  NONE.      PHYSICAL EXAMINATION:   GENERAL:  She is alert and oriented without acute distress.   VITAL SIGNS:  Blood pressure 95/54, heart rate 199 beats per minute, temperature 97.9 degrees, oxygen saturation 95%.   HEENT:  The eyes and ENT were unremarkable.     NECK:   The jugular vein was not elevated.  The thyroid was not enlarged.    SKIN:  There was no skin rash or lymph node enlargement.   LUNGS:  Clear.   CARDIAC:  Rhythm was irregularly-irregular.  The heart sounds were normal with no murmur.   ABDOMEN:  Examination showed no hepatomegaly.   EXTREMITIES:  There was no pedal edema.   NEUROLOGIC:  Examination showed no focal deficit.      ASSESSMENT AND RECOMMENDATIONS:  Ms. Servin is a 58-year-old [place] female who was admitted with atrial fibrillation and rapid ventricular rate.  She has recurrent atrial fibrillation after DC cardioversion.  Her LV function is relatively preserved according to my review of the transthoracic imaging which is a high quality.  For management of atrial fibrillation, I have started her on flecainide 150 mg p.o. b.i.d.  She will be scheduled for a redo cardioversion tomorrow.  Her current blood pressure is quite low and rate control is not feasible.  I am reluctant to redo the cardioversion today because she would have early recurrence of atrial fibrillation.  If the patient has recurrent atrial fibrillation on flecainide therapy, she may be a candidate for atrial fibrillation ablation.  Without health insurance, she will need assistance for her anticoagulation and flecainide.  Certainly if ablation is needed, that will have to be done if she gets admitted for recurrent atrial fibrillation in the future.         MANJINDER HAN MD             D: 2018   T: 2018   MT: CHINEDU      Name:     MARÍA SERVIN   MRN:      [id number]        Account:       [id number]   :      1960           Consult Date:  2018      Document: [id number]

## 2018-08-01 NOTE — PLAN OF CARE
Problem: Patient Care Overview  Goal: Plan of Care/Patient Progress Review  Outcome: Improving  VSS,except hypotension  no pain, no wounds, A + O x 4. Independent to bathroom. Neuros intact. Transitioned off heparin drip to Xarelto at 1700. Metoprolol not given at hs as patient hypotensive.

## 2018-08-01 NOTE — PROGRESS NOTES
[facility]    Hospitalist Progress Note    Date of Service (when I saw the patient): 08/01/2018    Assessment & Plan   Christ Servin is a 58 year old female with a past medical history of GERD who was admitted on 7/26/2018 with shortness of breath and afib with RVR.     Recurrent Atrial fibrillation with rapid ventricular rate:   New diagnosis for the patient and associated with shortness of breath, see acute CHF exacerbation below.  Notably she does have evidence of hyperthyroidism which could be driving some of this.  She was initially given metoprolol and then started on diltiazem and a heparin drip.   - Attempted up titration of BB without improvement in rates  - S/p successful RANDY with cardioversion 7/30. Unfortunately reoccur ance on 8/1 with RVR  - Flecainide 150 mg bid added 8/1  - Had been on Heparin bridged to warfarin due to cost, received 3 months of samples of Xarelto from [facility] for discharge. Continue Xarelto  - Continue Metoprolol 50 mg bid (changed to tartrate for improved cost)  - See below for discussion about hyperthyroidism  - Correct electrolytes as needed.     Acute congestive heart failure exacerbation:    Noted that abd CT shows ascites which is likely from CHF.  Suspect this is related to the atrial fibrillation.  Echo shows preserved EF of 45-50% with moderate global hypokinesia, decreased RV function, dilated atria, dilated IVC, pulmonary hypertension and mod/severe TR with a small pericardial effusion.    -Cardiology following  -Treated with a lasix drip - pt now appears euvolemic and was started on Torsemide 20 mg po daily 7/28 and decreased to 10 mg/d 7/29. Changed to Lasix 20 mg/d 7/31 due to cost. Though received IV on 8/1 due to hyperkalemia (see below). Hold further diuresis. Assess K and Cr 8/2. If stable can restart Lasix 20 mg/d  - Started on Lisinopril 5 mg bid. BPs soft. D/c'd 7/30. Can consider addition as outpatient.      Acute kidney injury, stage II:    Patient with a creatinine of 1.20 on admission, elevated from baseline of approximately 1.04.  -- Cr trend 1.40--1.17--1.61--1.11  - Follow bmp in am.   - Avoid nephrotoxins.      Hyperkalemia  - K 5.6, persistent despite KCL d/c'd  -  over 4 hr and Lasix 40 mg x 1. Repeat BMP this afternoon  - Repeat BMP in am    Abdominal ascites with slight bili elevation:   Patient with CT Abd completed noting ascites. U/s ruq completed 7/23 showing prior cholecystectomy, and no evidence for fatty infiltration. ALT elevated on 7/1, however, improving on 7/26/18.  Wonder if this is all due to volume overload with CHF  - repeat LFTs improving.   - diuresis as above       Hyperthyroidism:   Patient with a TSH of 4.78, and a free T4 level of 1.65.  Repeat on 7/27 shows TSH of 3.5 and FT4 of 1.78.  Thyroid US shows a 7mm nodule in mid lateral left lobe.  - Started methimazole as patient is symptomatic with afib. Per outpatient pharmacy cost $43/month and patient notes this is affordable  - will need outpatient endocrine follow up to discuss further treatment and work up      GERD: Stable.  - Continue prior to admission ranitidine.    DVT Prophylaxis: Warfarin  Code Status: Full Code    # Pain Assessment:  Current Pain Score 8/1/2018   Patient currently in pain? denies   Pain score (0-10) -   Pain location -   Pain descriptors -   Christ guajardo pain level was assessed and she currently denies pain.        Disposition: New RVR this am. Cardioversion again tomorrow.      Triny Huffman       Interval History    New Afib with RVR this am. Patent asymptomatic. Denies palpitations or chest pain. Plan discussed with EP    -Data reviewed today: I reviewed all new labs and imaging results over the last 24 hours. I personally reviewed no images or EKG's today.    Physical Exam   Temp: 98.5  F (36.9  C) Temp src: Oral BP: 103/68 Pulse: 75 Heart Rate: 63 Resp: 18 SpO2: 100 % O2 Device: Nasal cannula Oxygen Delivery: 3 LPM  Vitals:     07/30/18 0700 07/31/18 0550 08/01/18 0604   Weight: 69.8 kg (153 lb 12.8 oz) 69.3 kg (152 lb 11.2 oz) 68.3 kg (150 lb 9.6 oz)     Vital Signs with Ranges  Temp:  [97.9  F (36.6  C)-98.5  F (36.9  C)] 98.5  F (36.9  C)  Pulse:  [75] 75  Heart Rate:  [] 63  Resp:  [16-20] 18  BP: ()/(54-70) 103/68  SpO2:  [95 %-100 %] 100 %       Gen: Patient in no acute distress.  Appears comfortable.  Heart:  Irregular and tachcardic No murmurs.  Lungs:  Clear to auscultation at apices, no wheezing. Crackles bilateral bases.   Abdomen:  Soft, non tender, non distended, bowel sounds positive.  Extremities:  No edema.    Medications     diltiazem (CARDIZEM) infusion ADULT Stopped (07/30/18 0855)       flecainide  150 mg Oral Q12H RAHEEL     methimazole  5 mg Oral TID     metoprolol tartrate  50 mg Oral BID     ranitidine  150 mg Oral Daily     rivaroxaban ANTICOAGULANT  20 mg Oral Daily with supper       Data     Recent Labs  Lab 08/01/18  0550 07/31/18  0551 07/30/18  0626 07/30/18  0035  07/27/18  0516 07/26/18  1308 07/26/18  0946 07/26/18  0538   WBC  --   --   --   --   --  7.3  --   --  8.6   HGB  --   --   --   --   --  13.9  --   --  14.8   MCV  --   --   --   --   --  93  --   --  94   PLT  --   --   --   --   --  161  --   --  171   INR  --  1.61* 1.70* 1.57*  < >  --   --   --   --    * 135 136  --   < > 144  --   --  143   POTASSIUM 5.6* 5.6* 5.3 5.6*  < > 3.5  --   --  4.7   CHLORIDE 101 103 103  --   < > 104  --   --  110*   CO2 22 26 24  --   < > 32  --   --  22   BUN 23 18 16  --   < > 18  --   --  16   CR 1.11* 1.40* 1.17*  --   < > 1.33*  --   --  1.20*   ANIONGAP 9 6 9  --   < > 8  --   --  11   ANKUSH 9.0 9.5 8.8  --   < > 8.9  --   --  8.6   * 100* 104*  --   < > 116*  --   --  116*   ALBUMIN  --   --   --   --   --  3.9  --   --  3.6   PROTTOTAL  --   --   --   --   --  6.9  --   --  6.7*   BILITOTAL  --   --   --   --   --  1.8*  --   --  1.5*   ALKPHOS  --   --   --   --   --  103  --   --   101   ALT  --   --   --   --   --  48  --   --  51*   AST  --   --   --   --   --  31  --   --  41   LIPASE  --   --   --   --   --   --   --   --  139   TROPI  --   --   --   --   --   --  <0.015 <0.015 <0.015   < > = values in this interval not displayed.    No results found for this or any previous visit (from the past 24 hour(s)).

## 2018-08-01 NOTE — PLAN OF CARE
"Problem: Cardiac: Heart Failure (Adult)  Goal: Signs and Symptoms of Listed Potential Problems Will be Absent, Minimized or Managed (Cardiac: Heart Failure)  Signs and symptoms of listed potential problems will be absent, minimized or managed by discharge/transition of care (reference Cardiac: Heart Failure (Adult) CPG).   Outcome: No Change  Pt A/O. Up independently. Denies pain. VSS. Tele shows SR. Starting on xeralto. Planned to go home today and follow up as outpatient for heart failure. Pt currently resting comfortably.    /70 (BP Location: Left arm)  Pulse 75  Temp 98  F (36.7  C) (Oral)  Resp 16  Ht 1.626 m (5' 4\")  Wt 68.3 kg (150 lb 9.6 oz)  SpO2 96%  BMI 25.85 kg/m2        "

## 2018-08-01 NOTE — PROGRESS NOTES
Admitted for atrial fib with RVR. RANDY and cardioversion on 7-. Recurrent atrial fib this am. Fast HR up to 190 bpm.  Reviewed echo image. Believe EF would be normal in sinus rhythm.  Mildly elevated free T4 with mildly elevated TSH. On Tapazole 5 mg tid, waiting for outpatient endocrinology consultation.  No health insurance, immigration status unclear.    Recommend flecainide 150 mg bid. Repeat cardioversion tomorrow.  She will need outpatient anticoagulation and flecainide at the time of discharge. Would appreciate social service help.  Consider atrial fib ablation if she does not do well on flecainide, being aware that would be done only if she is readmited for recurrent atrial fib.

## 2018-08-01 NOTE — CONSULTS
Care Transition Initial Assessment - RN    Reason For Consult: Reconsulted, as discharge needs have changed:   + Now will discharge with 3mo samples of Xarelto instead of Warfarin  + No longer needs INR  + Needs 3mo cards followup and appointment cost estimates.   + [facility] cost estimates need to be communicated to pt  + Research flecanide discount program    Called Flower Whalen at [phone] to clarify upfront costs with [facility].   Called pre-registration at [phone] to help with Cardiology followup cost estimates.     Met with: Patient.    DATA   Active Problems:    Atrial fibrillation (H)       Cognitive Status: alert and oriented.  Primary Care Clinic Name: [facility]  Primary Care MD Name: Sona Patiño NP  Contact information and PCP information verified: Yes, has been to [facility] and if PCP followup needed, prefers this clinic   Lives With: significant other  Living Arrangements: mobile home  Insurance concerns: Self-pay    ASSESSMENT  Patient currently receives the following services:  None  Identified issues/concerns regarding health management: Need cost estimates for meds and followups    PLAN  Financial costs for the patient include: All meds and followups are out-of-pocket as pt is not a [place] citizen and thus ineligible for insurance except Emergency [place] (which ONLY covers ER visit).  Patient given options and choices for discharge Yes.  Patient/family is agreeable to the plan?  Yes: appreciates the information.  Patient anticipates discharging to Home (apartment / significant other).  Patient anticipates needs for home equipment: No  Plan/Disposition: Home     Appointments and estimated costs:     Rescheduled PCP appointment from 8/2 to 8/6 as pt is back in Afib and unlikely to discharge today.  Also cancelled 8/2 INR appointment as pt will now be discharged on Xarelto, not Coumadin.   Aug 06, 2018  9:00 AM    Office Visit with Sona Patiño PA-C   [facility]    Please  bring $75 to this appointment, which is needed upfront to be seen.   + The remainder appointment cost will be billed to you. The cost is likely to be similar to your prior appointments there    Clarified with [facility] their upfront costs, and added these details to the appointment notes on AVS:   Aug 13, 2018  7:40 AM   LAB with [facility]  [facility] [place]   Aug 13, 2018  8:10 AM   [facility] Enrollment with Jillian Solomon PA-C  [facility]   The expected cost of the Heart (CORE) appointment is estimated $294 (level 4) to $397 (level 5).  + There is not an upfront cost to this appointment--everything will be billed to your address.  + Talk about continuing Xarelto or switching to Warfarin at this appointment.     Obtained cost estimate for Cardiology visit.   Oct 22, 2018  3:15 PM CDT   Return Visit with Arlet Dixon MD   [facility]    The expected cost of the Cardiology appointment is a $265 facility charge plus the MD charge (depending on the complexity of your case): Level 1 $120, Level 2 $201, Level 3 $295.      Called discharge pharmacy to discuss drug costs.  Pt has a coupon card on file that does offer discount on Flecainide.  Normally, this med would cost $215 per month without insurance.  With the coupon card, a 1 month supply would cost $46.32; a 3 month supply would cost $120.      Care  (CTS) will continue to follow as needed.    Aishwarya Hoover RN, BSN  [facility] Care Coordinator   Mobile Phone: [phone]

## 2018-08-02 ENCOUNTER — ANESTHESIA EVENT (OUTPATIENT)
Dept: SURGERY | Facility: CLINIC | Age: 58
End: 2018-08-02
Payer: MEDICAID

## 2018-08-02 ENCOUNTER — ANESTHESIA (OUTPATIENT)
Dept: SURGERY | Facility: CLINIC | Age: 58
End: 2018-08-02
Payer: MEDICAID

## 2018-08-02 ENCOUNTER — APPOINTMENT (OUTPATIENT)
Dept: SURGERY | Facility: CLINIC | Age: 58
End: 2018-08-02
Payer: MEDICAID

## 2018-08-02 LAB
ANION GAP SERPL CALCULATED.3IONS-SCNC: 11 MMOL/L (ref 3–14)
BUN SERPL-MCNC: 28 MG/DL (ref 7–30)
CALCIUM SERPL-MCNC: 8.7 MG/DL (ref 8.5–10.1)
CHLORIDE SERPL-SCNC: 104 MMOL/L (ref 94–109)
CO2 SERPL-SCNC: 22 MMOL/L (ref 20–32)
CREAT SERPL-MCNC: 1.21 MG/DL (ref 0.52–1.04)
DIGOXIN SERPL-MCNC: <0.1 UG/L (ref 0.5–2)
GFR SERPL CREATININE-BSD FRML MDRD: 46 ML/MIN/1.7M2
GLUCOSE SERPL-MCNC: 121 MG/DL (ref 70–99)
INR PPP: 3.51 (ref 0.86–1.14)
LACTATE BLD-SCNC: 1 MMOL/L (ref 0.7–2)
MAGNESIUM SERPL-MCNC: 2.1 MG/DL (ref 1.6–2.3)
POTASSIUM SERPL-SCNC: 4.1 MMOL/L (ref 3.4–5.3)
SODIUM SERPL-SCNC: 137 MMOL/L (ref 133–144)

## 2018-08-02 PROCEDURE — 36415 COLL VENOUS BLD VENIPUNCTURE: CPT | Performed by: HOSPITALIST

## 2018-08-02 PROCEDURE — 93005 ELECTROCARDIOGRAM TRACING: CPT

## 2018-08-02 PROCEDURE — 25000132 ZZH RX MED GY IP 250 OP 250 PS 637: Performed by: INTERNAL MEDICINE

## 2018-08-02 PROCEDURE — 85610 PROTHROMBIN TIME: CPT | Performed by: HOSPITALIST

## 2018-08-02 PROCEDURE — 83735 ASSAY OF MAGNESIUM: CPT | Performed by: HOSPITALIST

## 2018-08-02 PROCEDURE — 83605 ASSAY OF LACTIC ACID: CPT | Performed by: HOSPITALIST

## 2018-08-02 PROCEDURE — 25000125 ZZHC RX 250: Performed by: HOSPITALIST

## 2018-08-02 PROCEDURE — 92960 CARDIOVERSION ELECTRIC EXT: CPT | Performed by: INTERNAL MEDICINE

## 2018-08-02 PROCEDURE — 40000671 ZZH STATISTIC ANESTHESIA CASE

## 2018-08-02 PROCEDURE — 80048 BASIC METABOLIC PNL TOTAL CA: CPT | Performed by: HOSPITALIST

## 2018-08-02 PROCEDURE — 5A2204Z RESTORATION OF CARDIAC RHYTHM, SINGLE: ICD-10-PCS | Performed by: INTERNAL MEDICINE

## 2018-08-02 PROCEDURE — 80162 ASSAY OF DIGOXIN TOTAL: CPT | Performed by: HOSPITALIST

## 2018-08-02 PROCEDURE — 21000001 ZZH R&B HEART CARE

## 2018-08-02 PROCEDURE — 25000128 H RX IP 250 OP 636: Performed by: ANESTHESIOLOGY

## 2018-08-02 PROCEDURE — 92960 CARDIOVERSION ELECTRIC EXT: CPT

## 2018-08-02 PROCEDURE — 99232 SBSQ HOSP IP/OBS MODERATE 35: CPT | Performed by: INTERNAL MEDICINE

## 2018-08-02 PROCEDURE — 93010 ELECTROCARDIOGRAM REPORT: CPT | Performed by: INTERNAL MEDICINE

## 2018-08-02 PROCEDURE — 25000132 ZZH RX MED GY IP 250 OP 250 PS 637: Performed by: HOSPITALIST

## 2018-08-02 PROCEDURE — 99232 SBSQ HOSP IP/OBS MODERATE 35: CPT | Performed by: HOSPITALIST

## 2018-08-02 PROCEDURE — 25000128 H RX IP 250 OP 636: Performed by: NURSE ANESTHETIST, CERTIFIED REGISTERED

## 2018-08-02 RX ORDER — SODIUM CHLORIDE, SODIUM LACTATE, POTASSIUM CHLORIDE, CALCIUM CHLORIDE 600; 310; 30; 20 MG/100ML; MG/100ML; MG/100ML; MG/100ML
500 INJECTION, SOLUTION INTRAVENOUS CONTINUOUS
Status: DISCONTINUED | OUTPATIENT
Start: 2018-08-02 | End: 2018-08-03

## 2018-08-02 RX ORDER — HYDROMORPHONE HYDROCHLORIDE 1 MG/ML
.3-.5 INJECTION, SOLUTION INTRAMUSCULAR; INTRAVENOUS; SUBCUTANEOUS EVERY 10 MIN PRN
Status: DISCONTINUED | OUTPATIENT
Start: 2018-08-02 | End: 2018-08-03

## 2018-08-02 RX ORDER — ALBUTEROL SULFATE 0.83 MG/ML
2.5 SOLUTION RESPIRATORY (INHALATION) EVERY 4 HOURS PRN
Status: DISCONTINUED | OUTPATIENT
Start: 2018-08-02 | End: 2018-08-03 | Stop reason: HOSPADM

## 2018-08-02 RX ORDER — ONDANSETRON 4 MG/1
4 TABLET, ORALLY DISINTEGRATING ORAL EVERY 30 MIN PRN
Status: DISCONTINUED | OUTPATIENT
Start: 2018-08-02 | End: 2018-08-03 | Stop reason: HOSPADM

## 2018-08-02 RX ORDER — MEPERIDINE HYDROCHLORIDE 25 MG/ML
12.5 INJECTION INTRAMUSCULAR; INTRAVENOUS; SUBCUTANEOUS
Status: DISCONTINUED | OUTPATIENT
Start: 2018-08-02 | End: 2018-08-03 | Stop reason: HOSPADM

## 2018-08-02 RX ORDER — FENTANYL CITRATE 50 UG/ML
25-50 INJECTION, SOLUTION INTRAMUSCULAR; INTRAVENOUS
Status: DISCONTINUED | OUTPATIENT
Start: 2018-08-02 | End: 2018-08-03

## 2018-08-02 RX ORDER — NALOXONE HYDROCHLORIDE 0.4 MG/ML
.1-.4 INJECTION, SOLUTION INTRAMUSCULAR; INTRAVENOUS; SUBCUTANEOUS
Status: ACTIVE | OUTPATIENT
Start: 2018-08-02 | End: 2018-08-03

## 2018-08-02 RX ORDER — SODIUM CHLORIDE, SODIUM LACTATE, POTASSIUM CHLORIDE, CALCIUM CHLORIDE 600; 310; 30; 20 MG/100ML; MG/100ML; MG/100ML; MG/100ML
INJECTION, SOLUTION INTRAVENOUS CONTINUOUS
Status: DISCONTINUED | OUTPATIENT
Start: 2018-08-02 | End: 2018-08-03 | Stop reason: HOSPADM

## 2018-08-02 RX ORDER — HYDRALAZINE HYDROCHLORIDE 20 MG/ML
2.5-5 INJECTION INTRAMUSCULAR; INTRAVENOUS EVERY 10 MIN PRN
Status: DISCONTINUED | OUTPATIENT
Start: 2018-08-02 | End: 2018-08-03

## 2018-08-02 RX ORDER — PROPOFOL 10 MG/ML
INJECTION, EMULSION INTRAVENOUS PRN
Status: DISCONTINUED | OUTPATIENT
Start: 2018-08-02 | End: 2018-08-02

## 2018-08-02 RX ORDER — ONDANSETRON 2 MG/ML
4 INJECTION INTRAMUSCULAR; INTRAVENOUS EVERY 30 MIN PRN
Status: DISCONTINUED | OUTPATIENT
Start: 2018-08-02 | End: 2018-08-03 | Stop reason: HOSPADM

## 2018-08-02 RX ADMIN — SODIUM CHLORIDE, POTASSIUM CHLORIDE, SODIUM LACTATE AND CALCIUM CHLORIDE 500 ML: 600; 310; 30; 20 INJECTION, SOLUTION INTRAVENOUS at 10:25

## 2018-08-02 RX ADMIN — RIVAROXABAN 20 MG: 10 TABLET, FILM COATED ORAL at 16:45

## 2018-08-02 RX ADMIN — PROPOFOL 40 MG: 10 INJECTION, EMULSION INTRAVENOUS at 10:50

## 2018-08-02 RX ADMIN — METHIMAZOLE 5 MG: 5 TABLET ORAL at 21:23

## 2018-08-02 RX ADMIN — METHIMAZOLE 5 MG: 5 TABLET ORAL at 08:32

## 2018-08-02 RX ADMIN — FLECAINIDE ACETATE 150 MG: 100 TABLET ORAL at 08:32

## 2018-08-02 RX ADMIN — FLECAINIDE ACETATE 150 MG: 100 TABLET ORAL at 21:23

## 2018-08-02 RX ADMIN — METOPROLOL TARTRATE 50 MG: 50 TABLET ORAL at 21:23

## 2018-08-02 RX ADMIN — ONDANSETRON 4 MG: 4 TABLET, ORALLY DISINTEGRATING ORAL at 05:34

## 2018-08-02 RX ADMIN — RANITIDINE 150 MG: 150 TABLET ORAL at 08:32

## 2018-08-02 RX ADMIN — PROPOFOL 20 MG: 10 INJECTION, EMULSION INTRAVENOUS at 10:51

## 2018-08-02 RX ADMIN — METHIMAZOLE 5 MG: 5 TABLET ORAL at 16:44

## 2018-08-02 RX ADMIN — ACETAMINOPHEN 650 MG: 325 TABLET, FILM COATED ORAL at 13:25

## 2018-08-02 ASSESSMENT — ACTIVITIES OF DAILY LIVING (ADL)
ADLS_ACUITY_SCORE: 9

## 2018-08-02 ASSESSMENT — ENCOUNTER SYMPTOMS
SEIZURES: 0
DYSRHYTHMIAS: 0

## 2018-08-02 ASSESSMENT — PAIN DESCRIPTION - DESCRIPTORS: DESCRIPTORS: STABBING

## 2018-08-02 NOTE — PROGRESS NOTES
1105HR -Pt cares assmed from Peer RN.  12L EKG done indicative of NSR  115hr - Pt now transferred back to floor. Pt transferred w/transport monitor.  Pt denies pain/other needs. Remains in NSR.

## 2018-08-02 NOTE — PROGRESS NOTES
"BRIEF NUTRITION ASSESSMENT      REASON FOR ASSESSMENT:  LOS    NUTRITION HISTORY:  See Nutrition Note from (7/27) - low Na diet teaching was done    CURRENT DIET AND INTAKE:  Diet:  NPO for cardioversion              Chart reviewed  Pt has been on a '2gm Na, 1800 mL fluid restriction\" diet  Flowsheets reflect intake has been 100%  She has been ordering 3 full meals per day    ANTHROPOMETRICS:  Height: 5'4\"  Weight: (8/2) 68 kg kg  BMI: 25.7 kg/m2  IBW:  54.5 kg  Weight Status: Overweight BMI 25-29.9  %IBW: 125%  Weight History:   Vitals:    07/26/18 0331 07/27/18 1556 07/28/18 0500 07/29/18 0500   Weight: 80 kg (176 lb 5.9 oz) 71.7 kg (158 lb 1.6 oz) 69.1 kg (152 lb 6.4 oz) 69.3 kg (152 lb 11.2 oz)    07/30/18 0700 07/31/18 0550 08/01/18 0604 08/02/18 0610   Weight: 69.8 kg (153 lb 12.8 oz) 69.3 kg (152 lb 11.2 oz) 68.3 kg (150 lb 9.6 oz) 68 kg (150 lb)         LABS:  Labs noted    MALNUTRITION:  Patient does not meet two of the following criteria necessary for diagnosing malnutrition: significant weight loss, reduced intake, subcutaneous fat loss, muscle loss or fluid retention    NUTRITION INTERVENTION:  Nutrition Diagnosis:  No nutrition diagnosis at this time.    Implementation:  Nutrition Education ---> Pt received CHF diet teaching on 7/27 (see Nutrition Note for details)      FOLLOW UP/MONITORING:   Will re-evaluate in 7 - 10 days, or sooner, if re-consulted.          "

## 2018-08-02 NOTE — PROCEDURES
Cardioversion Note    Informed consent was signed by the patient.  A timeout was taken.    Anesthesia was present for cardioversion, see separate documentation for their sedation.    Baseline rhythm: afib, rate 120  Synchronized cardioversion performed at 120J, x1  Post-DCCV rhythm: sinus, rate 60's    No complications.    Ilan Butler MD  Cardiology - [facility]  Pager: [phone]  Text Page  August 2, 2018

## 2018-08-02 NOTE — PLAN OF CARE
Problem: Patient Care Overview  Goal: Plan of Care/Patient Progress Review  Outcome: Improving  VSS on RA, BPs continue to be soft.  AM Metoprolol held.  Pt was NPO since for DCCV.  DCCV completed.  Pt was afib RVR prior and NSR in 70s after.  Pt tolerating food and liquids.  1800cc fluid restriction. Pt up SBA/ ind in room.  Pt complained of stabbing pain in lower rib cage after DCCV (pt reports similar pain at home).  Tylenol given with relief.  Possible discharge tomorrow.  Call light within reach.  Will continue to monitor.

## 2018-08-02 NOTE — ANESTHESIA POSTPROCEDURE EVALUATION
Patient: Christ Servin    * No procedures listed *    Diagnosis:* No pre-op diagnosis entered *  Diagnosis Additional Information: afib    Anesthesia Type:  MAC    Note:  Anesthesia Post Evaluation    Patient location during evaluation: PACU  Patient participation: Able to fully participate in evaluation  Level of consciousness: awake and alert  Pain management: adequate  Airway patency: patent  Cardiovascular status: acceptable  Respiratory status: acceptable  Hydration status: acceptable  PONV: none     Anesthetic complications: None    Comments:           Last vitals:  Vitals:    08/02/18 1135 08/02/18 1140 08/02/18 1145   BP: 102/77 104/80 101/79   Pulse:      Resp: 18 18 24   Temp:      SpO2: 96% 96% 97%         Electronically Signed By: Janet Armas MD  August 2, 2018  11:51 AM

## 2018-08-02 NOTE — ANESTHESIA CARE TRANSFER NOTE
Patient: Christ Servin    * No procedures listed *    Diagnosis: * No pre-op diagnosis entered *  Diagnosis Additional Information: afib    Anesthesia Type:   MAC     Note:  Airway :Nasal Cannula  Patient transferred to:PACU  Handoff Report: Identifed the Patient, Identified the Reponsible Provider, Reviewed the pertinent medical history, Discussed the surgical course, Reviewed Intra-OP anesthesia mangement and issues during anesthesia, Set expectations for post-procedure period and Allowed opportunity for questions and acknowledgement of understanding    Native airway.  Patient hyperventilated with 100% oxygen via mask prior to treatment.   Anesthesia induced using patent peripheral IV.    At time of handoff to PACU, patient exhibited spontaneous respirations, adequate tidal volumes, airway patent. Oxygen via nasal cannula at 4 liters per minute to PACU in cart with siderails up, connected to wall O2 in PACU. All monitors and alarms on and functioning. Report given to PACU RN and questions answered.     Electronically Signed By: DIMITRI Dahl CRNA  August 2, 2018  10:57 AM

## 2018-08-02 NOTE — PROGRESS NOTES
[facility]    Hospitalist Progress Note      Assessment & Plan   Christ Servin is a 58 year old female who was admitted on 7/26/2018.  Past medical history of GERD who was admitted on 7/26/2018 with shortness of breath and afib with RVR.      Recurrent Atrial fibrillation with rapid ventricular rate:   New diagnosis for the patient and associated with shortness of breath, see acute CHF exacerbation below.  Notably she does have evidence of hyperthyroidism which could be driving some of this.  No improvement with titrate of BB, underwent RANDY with cardioversion 7/30 but had recurrence 8/1 now s/p repeat CV 8/2.    - Flecainide 150 mg bid added 8/1  - Had been on Heparin bridged to warfarin due to cost, received 3 months of samples of Xarelto from [facility] for discharge. Continue Xarelto  - Continue Metoprolol 50 mg bid (changed to tartrate for improved cost)  - See below for discussion about hyperthyroidism  - Correct electrolytes as needed.      Acute congestive heart failure exacerbation:    Noted that abd CT shows ascites which is likely from CHF.  Suspect this is related to the atrial fibrillation.  Echo shows preserved EF of 45-50% with moderate global hypokinesia, decreased RV function, dilated atria, dilated IVC, pulmonary hypertension and mod/severe TR with a small pericardial effusion.  Briefly on lisinopril but then held due to soft blood pressures and RAMONE, may consider adding as outpatient.  - diuretics now held due to RAMONE, resume lasix as appropriate  -Cardiology following      Acute kidney injury, stage II:   Patient with a creatinine of 1.20 on admission, elevated from baseline of approximately 1.04.  - Cr stable at 1.2, monitor  - Follow bmp in am.   - Avoid nephrotoxins.      Hyperkalemia: resolved  K elevated on 7/31 and 8/1, likely due to supplemental K which has since been discontinued.    - K wnl today     Abdominal ascites with slight bili elevation:   Patient with CT Abd  completed noting ascites. U/s ruq completed 7/23 showing prior cholecystectomy, and no evidence for fatty infiltration. ALT elevated on 7/1, however, improving on 7/26/18.  Wonder if this is all due to volume overload with CHF.  Largely normal on repeat 7/27.      Hyperthyroidism:   Patient with a TSH of 4.78, and a free T4 level of 1.65.  Repeat on 7/27 shows TSH of 3.5 and FT4 of 1.78.  Thyroid US shows a 7mm nodule in mid lateral left lobe.  - Started methimazole as patient is symptomatic with afib. Per outpatient pharmacy cost $43/month and patient notes this is affordable  - will need outpatient endocrine follow up to discuss further treatment and work up      GERD: Stable.  - Continue prior to admission ranitidine.      # Pain Assessment:  Current Pain Score 8/2/2018   Patient currently in pain? -   Pain score (0-10) 1   Pain location -   Pain descriptors -   Christ guajardo pain level was assessed and she currently denies pain.        DVT Prophylaxis: Pneumatic Compression Devices  Code Status: Full Code    Disposition: Expected discharge tomorrow if remains in NSR.    Kali Mayo    Interval History   Feeling better after cardioversion.  Had some lower rib pain which resolved with tylenol.  No chest pain/pressure, dyspnea or lightheadedness.  Has no other complaints.    -Data reviewed today: I reviewed all new labs and imaging results over the last 24 hours. I personally reviewed no images or EKG's today.    Physical Exam   Temp: 98.4  F (36.9  C) Temp src: Oral BP: 91/61 Pulse: 72 Heart Rate: 81 Resp: 18 SpO2: 99 % O2 Device: None (Room air) Oxygen Delivery: 2 LPM  Vitals:    07/31/18 0550 08/01/18 0604 08/02/18 0610   Weight: 69.3 kg (152 lb 11.2 oz) 68.3 kg (150 lb 9.6 oz) 68 kg (150 lb)     Vital Signs with Ranges  Temp:  [97  F (36.1  C)-98.7  F (37.1  C)] 98.4  F (36.9  C)  Pulse:  [72] 72  Heart Rate:  [] 81  Resp:  [16-26] 18  BP: ()/(48-80) 91/61  SpO2:  [95 %-100 %] 99 %  I/O last 3 completed  shifts:  In: 550 [P.O.:100; I.V.:450]  Out: 1025 [Urine:1025]    Constitutional: Well developed, well nourished female in no acute distress  Respiratory: Lungs clear to ausculation bilaterally without crackles or wheezes, no tachypnea  Cardiovascular: regular rate and rhythm, normal S1/S2 without murmur, rubs or gallops, no peripheral edema  GI: abdomen soft, normal bowel sounds  Other:  alert and appropriate, cranial nerves grossly intact    Medications     - MEDICATION INSTRUCTIONS -       lactated ringers 500 mL (08/02/18 1025)     lactated ringers Stopped (08/02/18 1315)     - MEDICATION INSTRUCTIONS -       - MEDICATION INSTRUCTIONS -         flecainide  150 mg Oral Q12H RAHEEL     methimazole  5 mg Oral TID     metoprolol tartrate  50 mg Oral BID     ranitidine  150 mg Oral Daily     rivaroxaban ANTICOAGULANT  20 mg Oral Daily with supper     sodium chloride (PF)  3 mL Intravenous Q8H       Data     Recent Labs  Lab 08/02/18  0557 08/01/18  1345 08/01/18  0550 07/31/18  0551 07/30/18  0626  07/27/18  0516   WBC  --   --   --   --   --   --  7.3   HGB  --   --   --   --   --   --  13.9   MCV  --   --   --   --   --   --  93   PLT  --   --   --   --   --   --  161   INR 3.51*  --   --  1.61* 1.70*  < >  --      --  132* 135 136  < > 144   POTASSIUM 4.1 4.4 5.6* 5.6* 5.3  < > 3.5   CHLORIDE 104  --  101 103 103  < > 104   CO2 22  --  22 26 24  < > 32   BUN 28  --  23 18 16  < > 18   CR 1.21*  --  1.11* 1.40* 1.17*  < > 1.33*   ANIONGAP 11  --  9 6 9  < > 8   ANKUSH 8.7  --  9.0 9.5 8.8  < > 8.9   *  --  104* 100* 104*  < > 116*   ALBUMIN  --   --   --   --   --   --  3.9   PROTTOTAL  --   --   --   --   --   --  6.9   BILITOTAL  --   --   --   --   --   --  1.8*   ALKPHOS  --   --   --   --   --   --  103   ALT  --   --   --   --   --   --  48   AST  --   --   --   --   --   --  31   < > = values in this interval not displayed.    No results found for this or any previous visit (from the past 24  hour(s)).

## 2018-08-02 NOTE — PLAN OF CARE
Problem: Patient Care Overview  Goal: Plan of Care/Patient Progress Review  Outcome: No Change  Pt A/Ox4, Diaphoretic after eating supper, , Denies pain/sob. PRN IV Metoprolol 2.5mg given.  NS 50/hour to start at 12mn. Will cont to monitor. Plan DCCV tomorrow. Pt and SO appreciative of cares.

## 2018-08-02 NOTE — PROVIDER NOTIFICATION
MD Notification    Notified Person: MD    Notified Person Name: Hua     Notification Date/Time: 8/2, 1:00 AM     Notification Interaction: paged     Purpose of Notification: -150's, BP' soft 81/61 and 91/54    Orders Received: No new orders at this time, if patient becomes symptomatic will call RRT and emergently cardiovert    Comments: Pt asymptomatic overnight. One episode of nausea/dizziness early in AM resolved w/ PO Zofran      Guille

## 2018-08-02 NOTE — PROGRESS NOTES
"  Cardiology Progress Note          Assessment and Plan:   Admitted for atrial fib with RVR. RANDY and cardioversion on 7-. Recurrent atrial fib this am. Fast HR up to 190 bpm.  Reviewed echo image. Believe EF would be normal in sinus rhythm.  Initial TSH 4.78 (normal 0.4-4.0), free T3 2.2 (normal 2.3-4.2), free T4 1.65 (normal 0.76-1.46). Repeat TSH 1 day later was normal 3.5, free T4 slightly increased at 1.78. Ultrasound showed a 7 mm left midlateral lobe nodule. Was diagnosed to have hyperthyroidism and started on Tapazole 5 mg tid during this admission. Pending outpatient endocrinology consultation.     No health insurance. She is from the [facility] and has been living with her boy friend for 9 years, without \"green card\".     Started flecainide 150 mg bid on 8-1. Heart rate is slowed to about 130s but atrial fib remains. Repeat cardioversion today.    She will need outpatient anticoagulation for 1 month and flecainide indefinitely for prevention of recurrent atrial fib. Would appreciate social service help.  Consider atrial fib ablation if she does not do well on flecainide, being aware that would be done only if she is readmited for recurrent atrial fib.    Quinn Quezada MD  Cardiology   [phone]                Diagnosis:     Patient Active Problem List   Diagnosis     Gastroesophageal reflux disease without esophagitis     Cough     Atrial fibrillation (H)                Physical Exam:   Blood pressure 106/71, pulse 75, temperature 98.7  F (37.1  C), temperature source Oral, resp. rate 18, height 1.626 m (5' 4\"), weight 68 kg (150 lb), SpO2 96 %, not currently breastfeeding.  Wt Readings from Last 4 Encounters:   08/02/18 68 kg (150 lb)   07/23/18 76.2 kg (168 lb)   07/05/18 78.2 kg (172 lb 4.8 oz)   07/01/18 76.5 kg (168 lb 9.6 oz)      I/O last 3 completed shifts:  In: 580 [P.O.:580]  Out: 1550 [Urine:1550]    Constitutional: Alert, no apparent distress,    Lungs: No crackles or wheezing,  "   Cardiovascular: irregular rate and rhythm, normal S1 and S2, and no murmur,    Lymphm node  Neck  ENT  Neurologic  Abdomen: No enlargement  No jugular vein extension or carotid bruit  No apparent abnormality  No focal deficit  Normal bowel sounds, soft, no distension, no tender   Skin: No rashes, no cyanosis   Extremity: No edema          Medications:     Current Facility-Administered Medications:      acetaminophen (TYLENOL) Suppository 650 mg, 650 mg, Rectal, Q4H PRN, Philippe Ureña DO     acetaminophen (TYLENOL) tablet 650 mg, 650 mg, Oral, Q4H PRN, Philippe Ureña DO, 650 mg at 07/30/18 1920     albuterol (PROAIR HFA/PROVENTIL HFA/VENTOLIN HFA) Inhaler 2 puff, 2 puff, Inhalation, Q6H PRN, Philippe Ureña DO     bisacodyl (DULCOLAX) Suppository 10 mg, 10 mg, Rectal, Daily PRN, Philippe Ureña DO     flecainide (TAMBOCOR) tablet 150 mg, 150 mg, Oral, Q12H RAHEEL, Quinn Quezada MD, 150 mg at 08/02/18 0832     Give 1/2 of usual dose of LONG ACTING insulin AM of procedure IF diabetic, , Does not apply, Continuous PRN, Quinn Quezada MD     HOLD Digoxin (Lanoxin) AM of procedure IF on digoxin, , Does not apply, Pilar GALICIA Huagui, MD     HOLD Insulin - RAPID/SHORT acting AM of procedure IF diabetic, , Does not apply, Pilar GALICIA Huagui, MD     HOLD Insulin - REGULAR AM of procedure IF diabetic, , Does not apply, Pilar GALICIA Huagui, MD     HOLD Oral hypoglycemics AM of procedure IF diabetic, , Does not apply, HOLDPilar Huagui, MD     magnesium sulfate 2 g in water intermittent infusion, 2 g, Intravenous, Once PRN, Quinn Quezada MD     magnesium sulfate 2 g in water intermittent infusion, 2 g, Intravenous, Daily PRN, Alfonso Long MD, 2 g at 07/30/18 0255     magnesium sulfate 4 g in 100 mL sterile water (premade), 4 g, Intravenous, Q4H PRN, Alfonso Long MD     May take oral meds with sip of water, the morning of Cardioversion procedure., , Does not apply, Continuous PRN, Li,  MD Quinn     melatonin tablet 1 mg, 1 mg, Oral, At Bedtime PRN, Philippe Ureña DO     methimazole (TAPAZOLE) tablet 5 mg, 5 mg, Oral, TID, Ralph Real DO, 5 mg at 08/02/18 0832     metoprolol (LOPRESSOR) injection 2.5-5 mg, 2.5-5 mg, Intravenous, Q4H PRN, Philippe Ureña DO, 2.5 mg at 08/01/18 1859     metoprolol tartrate (LOPRESSOR) tablet 50 mg, 50 mg, Oral, BID, Ton Enrique MD, 50 mg at 08/01/18 0932     naloxone (NARCAN) injection 0.1-0.4 mg, 0.1-0.4 mg, Intravenous, Q2 Min PRN, Philippe Ureña DO     ondansetron (ZOFRAN-ODT) ODT tab 4 mg, 4 mg, Oral, Q6H PRN, 4 mg at 08/02/18 0534 **OR** ondansetron (ZOFRAN) injection 4 mg, 4 mg, Intravenous, Q6H PRN, Philippe Ureña DO     polyethylene glycol (MIRALAX/GLYCOLAX) Packet 17 g, 17 g, Oral, Daily PRN, Philippe Ureña DO     potassium chloride (KLOR-CON) Packet 20-40 mEq, 20-40 mEq, Oral or Feeding Tube, Q2H PRN, Alfonso Long MD     potassium chloride 10 mEq in 100 mL intermittent infusion with 10 mg lidocaine, 10 mEq, Intravenous, Q1H PRN, Alfonso Long MD     potassium chloride 10 mEq in 100 mL sterile water intermittent infusion (premix), 10 mEq, Intravenous, Q1H PRN, Alfonso Long MD     potassium chloride 20 mEq in 50 mL intermittent infusion, 20 mEq, Intravenous, Q1H PRN, Alfonso Long MD     potassium chloride SA (K-DUR/KLOR-CON M) CR tablet 20 mEq, 20 mEq, Oral, Once PRN, Quinn Quezada MD     potassium chloride SA (K-DUR/KLOR-CON M) CR tablet 20-40 mEq, 20-40 mEq, Oral, Q2H PRN, Alfonso Long MD, 20 mEq at 07/28/18 1546     potassium chloride SA (K-DUR/KLOR-CON M) CR tablet 40 mEq, 40 mEq, Oral, Once PRN, Quinn Quezada MD     ranitidine (ZANTAC) tablet 150 mg, 150 mg, Oral, Daily, Philippe Ureña DO, 150 mg at 08/02/18 0832     rivaroxaban ANTICOAGULANT (XARELTO) tablet 20 mg, 20 mg, Oral, Daily with Klaus sewell Eric Ryan, MD, 20 mg at  08/01/18 1612     senna-docusate (SENOKOT-S;PERICOLACE) 8.6-50 MG per tablet 1 tablet, 1 tablet, Oral, BID PRN **OR** senna-docusate (SENOKOT-S;PERICOLACE) 8.6-50 MG per tablet 2 tablet, 2 tablet, Oral, BID PRN, Philippe Ureña DO     sodium chloride (PF) 0.9% PF flush 3 mL, 3 mL, Intravenous, Q1H PRN, Quinn Quezada MD     sodium chloride (PF) 0.9% PF flush 3 mL, 3 mL, Intravenous, Q8H, Quinn Quezada MD, 3 mL at 08/01/18 2134           Lab results:        Recent Labs   Lab Test  08/02/18   0557  08/01/18   1345  08/01/18   0550  07/31/18   0551   NA  137   --   132*  135   POTASSIUM  4.1  4.4  5.6*  5.6*   CHLORIDE  104   --   101  103   ANKUSH  8.7   --   9.0  9.5   CO2  22   --   22  26   BUN  28   --   23  18   CR  1.21*   --   1.11*  1.40*   GLC  121*   --   104*  100*     Recent Labs   Lab Test  07/27/18   0516  07/26/18   0538  07/23/18   1016   HGB  13.9  14.8  14.8   WBC  7.3  8.6  7.5   PLT  161  171  181     Recent Labs   Lab Test  08/02/18   0557  07/31/18   0551  07/30/18   0626   INR  3.51*  1.61*  1.70*     Recent Labs   Lab Test  07/26/18   1308  07/26/18   0946  07/26/18   0538   TROPI  <0.015  <0.015  <0.015

## 2018-08-02 NOTE — PLAN OF CARE
Problem: Patient Care Overview  Goal: Plan of Care/Patient Progress Review  Outcome: Declining  A&O. Tele: A fib RVR, -140's w/ soft BP's as low as 81/61. MD aware, evening dose of metoprolol held for low BP. Early this AM /78. Schedule flecainide 150 mg given last evening. All other VSS on room air. One episode of nausea and dizziness w/ ambulation, resolved w/ PO zofran early this AM, pt asymptomatic remainder of shift. Denies CP and SOB. Up w/ SBA to bathroom overnight. IVF @ 50 ml/hr, cardioversion this AM. K+ 4.1 this AM. Pt NPO since midnight. Will continue to monitor HR.

## 2018-08-02 NOTE — ANESTHESIA PREPROCEDURE EVALUATION
Procedure: * No procedures listed *  Preop diagnosis: * No pre-op diagnosis entered *    No Known Allergies  Past Medical History:   Diagnosis Date     Hyperthyroidism      Persistent atrial fibrillation (H)      Past Surgical History:   Procedure Laterality Date     CHOLECYSTECTOMY       Prior to Admission medications    Medication Sig Start Date End Date Taking? Authorizing Provider   albuterol (PROAIR HFA/PROVENTIL HFA/VENTOLIN HFA) 108 (90 Base) MCG/ACT Inhaler Inhale 2 puffs into the lungs every 6 hours as needed for shortness of breath / dyspnea or wheezing 7/5/18  Yes Gary Bunch MD   bismuth subsalicylate (PEPTO BISMOL) 262 MG/15ML suspension Take 15 mLs by mouth every 6 hours as needed for indigestion   Yes Reported, Patient   ranitidine (ZANTAC) 150 MG tablet Take 1 tablet (150 mg) by mouth 2 times daily for 14 days 7/23/18 8/6/18 Yes Parveen Sams MD     Current Facility-Administered Medications Ordered in Epic   Medication Dose Route Frequency Last Rate Last Dose     acetaminophen (TYLENOL) Suppository 650 mg  650 mg Rectal Q4H PRN         acetaminophen (TYLENOL) tablet 650 mg  650 mg Oral Q4H PRN   650 mg at 07/30/18 1920     albuterol (PROAIR HFA/PROVENTIL HFA/VENTOLIN HFA) Inhaler 2 puff  2 puff Inhalation Q6H PRN         bisacodyl (DULCOLAX) Suppository 10 mg  10 mg Rectal Daily PRN         flecainide (TAMBOCOR) tablet 150 mg  150 mg Oral Q12H RAHEEL   150 mg at 08/02/18 0832     Give 1/2 of usual dose of LONG ACTING insulin AM of procedure IF diabetic   Does not apply Continuous PRN         HOLD Digoxin (Lanoxin) AM of procedure IF on digoxin   Does not apply HOLD         HOLD Insulin - RAPID/SHORT acting AM of procedure IF diabetic   Does not apply HOLD         HOLD Insulin - REGULAR AM of procedure IF diabetic   Does not apply HOLD         HOLD Oral hypoglycemics AM of procedure IF diabetic   Does not apply HOLD         magnesium sulfate 2 g in water intermittent infusion  2 g Intravenous  Once PRN         magnesium sulfate 2 g in water intermittent infusion  2 g Intravenous Daily PRN   2 g at 07/30/18 0255     magnesium sulfate 4 g in 100 mL sterile water (premade)  4 g Intravenous Q4H PRN         May take oral meds with sip of water, the morning of Cardioversion procedure.   Does not apply Continuous PRN         melatonin tablet 1 mg  1 mg Oral At Bedtime PRN         methimazole (TAPAZOLE) tablet 5 mg  5 mg Oral TID   5 mg at 08/02/18 0832     metoprolol (LOPRESSOR) injection 2.5-5 mg  2.5-5 mg Intravenous Q4H PRN   2.5 mg at 08/01/18 1859     metoprolol tartrate (LOPRESSOR) tablet 50 mg  50 mg Oral BID   50 mg at 08/01/18 0932     naloxone (NARCAN) injection 0.1-0.4 mg  0.1-0.4 mg Intravenous Q2 Min PRN         ondansetron (ZOFRAN-ODT) ODT tab 4 mg  4 mg Oral Q6H PRN   4 mg at 08/02/18 0534    Or     ondansetron (ZOFRAN) injection 4 mg  4 mg Intravenous Q6H PRN         polyethylene glycol (MIRALAX/GLYCOLAX) Packet 17 g  17 g Oral Daily PRN         potassium chloride (KLOR-CON) Packet 20-40 mEq  20-40 mEq Oral or Feeding Tube Q2H PRN         potassium chloride 10 mEq in 100 mL intermittent infusion with 10 mg lidocaine  10 mEq Intravenous Q1H PRN         potassium chloride 10 mEq in 100 mL sterile water intermittent infusion (premix)  10 mEq Intravenous Q1H PRN         potassium chloride 20 mEq in 50 mL intermittent infusion  20 mEq Intravenous Q1H PRN         potassium chloride SA (K-DUR/KLOR-CON M) CR tablet 20 mEq  20 mEq Oral Once PRN         potassium chloride SA (K-DUR/KLOR-CON M) CR tablet 20-40 mEq  20-40 mEq Oral Q2H PRN   20 mEq at 07/28/18 1546     potassium chloride SA (K-DUR/KLOR-CON M) CR tablet 40 mEq  40 mEq Oral Once PRN         ranitidine (ZANTAC) tablet 150 mg  150 mg Oral Daily   150 mg at 08/02/18 0832     rivaroxaban ANTICOAGULANT (XARELTO) tablet 20 mg  20 mg Oral Daily with supper   20 mg at 08/01/18 1612     senna-docusate (SENOKOT-S;PERICOLACE) 8.6-50 MG per tablet 1 tablet   1 tablet Oral BID PRN        Or     senna-docusate (SENOKOT-S;PERICOLACE) 8.6-50 MG per tablet 2 tablet  2 tablet Oral BID PRN         sodium chloride (PF) 0.9% PF flush 3 mL  3 mL Intravenous Q1H PRN         sodium chloride (PF) 0.9% PF flush 3 mL  3 mL Intravenous Q8H   3 mL at 08/01/18 2134     No current Epic-ordered outpatient prescriptions on file.     Wt Readings from Last 1 Encounters:   08/02/18 68 kg (150 lb)     Temp Readings from Last 1 Encounters:   08/02/18 37.1  C (98.7  F) (Oral)     BP Readings from Last 6 Encounters:   08/02/18 106/71   07/23/18 (!) 127/112   07/05/18 106/80   07/01/18 110/60   06/25/18 104/80     Pulse Readings from Last 4 Encounters:   08/01/18 75   07/05/18 90   07/01/18 55   06/25/18 96     Resp Readings from Last 1 Encounters:   08/02/18 18     SpO2 Readings from Last 1 Encounters:   08/02/18 96%     Recent Labs   Lab Test  08/02/18   0557  08/01/18   1345  08/01/18   0550   NA  137   --   132*   POTASSIUM  4.1  4.4  5.6*   CHLORIDE  104   --   101   CO2  22   --   22   ANIONGAP  11   --   9   GLC  121*   --   104*   BUN  28   --   23   CR  1.21*   --   1.11*   ANKUSH  8.7   --   9.0     Recent Labs   Lab Test  07/27/18   0516  07/26/18   0538   WBC  7.3  8.6   HGB  13.9  14.8   PLT  161  171     Recent Labs   Lab Test  08/02/18   0557  07/31/18   0551   INR  3.51*  1.61*      RECENT LABS:   ECG: Sinus tachycardia with Premature atrial complexes  Low voltage QRS  Nonspecific ST and T wave abnormality  Abnormal ECG  When compared with ECG of 01-AUG-2018 09:29, (unconfirmed)  Sinus rhythm has replaced Atrial fibrillation    ECHO: Interpretation Summary     No thrombus is detected in the left atrial appendage.  Left ventricular systolic function is moderately reduced.  The visual ejection fraction is estimated at 30-35%.  There is moderate to mod-severe (2-3+) mitral regurgitation.  The study was technically difficult.       RANDY  Versed (2mg) was given intravenously. Fentanyl  (50mcg) was given  intravenously. I determined this patient to be an appropriate candidate for  the planned sedation and procedure and have reassessed the patient immediately  prior to sedation and procedure. Total sedation time: 25 minutes of continuous  bedside 1:1 monitoring. Consent to the procedure was obtained prior to  sedation. Prior to the exam, the oral cavity was checked and no overcrowding  was noted. The transesophageal probe was passed without difficulty. There were  no complications associated with this procedure.     Left Ventricle  The left ventricle is normal in size. There is normal left ventricular wall  thickness. Left ventricular systolic function is moderately reduced. The  visual ejection fraction is estimated at 30-35%. There is severe global  hypokinesia of the left ventricle.     Right Ventricle  The right ventricle is moderately dilated. Moderately decreased right  ventricular systolic function.     Atria  The left atrium is mild to moderately dilated. The right atrium is moderately  dilated. Left to right atrial shunt, small. A patent foramen ovale is  suspected. No thrombus is detected in the left atrial appendage.        Mitral Valve  The mitral valve leaflets are mildly thickened. There is no evidence of mitral  valve prolapse. There is moderate to mod-severe (2-3+) mitral regurgitation.  No systolic reversal on doppler of pulmonary veins. Two jets of MR are seen,  directed inferolaterally. The regurgitant volume of one of the jets is 20ml.     Tricuspid Valve  There is mild to moderate (1-2+) tricuspid regurgitation. The right  ventricular systolic pressure is approximated at 12.2 mmHg plus the right  atrial pressure.     Aortic Valve  The aortic valve is trileaflet. No aortic regurgitation is present. No aortic  stenosis is present.     Pulmonic Valve  The pulmonic valve is not well visualized. There is no pulmonic valvular  regurgitation.     Vessels  The aortic root is normal  size.        Pericardial/Pleural  There is no pericardial effusion.     Rhythm  The rhythm was rapid atrial fibrillation.      Anesthesia Evaluation     .             ROS/MED HX    ENT/Pulmonary:     (+)asthma , . Other pulmonary disease pleural effusions.   (-) sleep apnea   Neurologic:      (-) seizures, Neuropathy and migraines   Cardiovascular:        (-) hypertension, CAD, GRAYSON, arrhythmias, valvular problems/murmurs and dyslipidemia   METS/Exercise Tolerance:  >4 METS   Hematologic:        (-) history of blood clots, anemia and other hematologic disorder   Musculoskeletal:        (-) arthritis   GI/Hepatic:     (+) GERD Asymptomatic on medication, Other GI/Hepatic ascities      Renal/Genitourinary:     (+) chronic renal disease, type: CRI,    (-) nephrolithiasis   Endo:     (+) thyroid problem  hyperthyroidism, .   (-) Type I DM and Type II DM   Psychiatric:         Infectious Disease:        (-) Recent Fever   Malignancy:         Other:                     Physical Exam  Normal systems: cardiovascular, pulmonary and dental    Airway   Mallampati: III  TM distance: >3 FB    Dental     Cardiovascular   Rhythm and rate: irregular and normal      Pulmonary    breath sounds clear to auscultation                    Anesthesia Plan      History & Physical Review      ASA Status:  3 .    NPO Status:  > 8 hours    Plan for MAC Reason for MAC:  Deep or markedly invasive procedure (G8)    Propofol bolus      Postoperative Care      Consents  Anesthetic plan, risks, benefits and alternatives discussed with:  Patient..                          .

## 2018-08-03 VITALS
RESPIRATION RATE: 14 BRPM | WEIGHT: 150.5 LBS | SYSTOLIC BLOOD PRESSURE: 96 MMHG | HEART RATE: 70 BPM | BODY MASS INDEX: 25.7 KG/M2 | OXYGEN SATURATION: 97 % | DIASTOLIC BLOOD PRESSURE: 83 MMHG | TEMPERATURE: 98.6 F | HEIGHT: 64 IN

## 2018-08-03 LAB
ANION GAP SERPL CALCULATED.3IONS-SCNC: 8 MMOL/L (ref 3–14)
BUN SERPL-MCNC: 25 MG/DL (ref 7–30)
CALCIUM SERPL-MCNC: 8.7 MG/DL (ref 8.5–10.1)
CHLORIDE SERPL-SCNC: 104 MMOL/L (ref 94–109)
CO2 SERPL-SCNC: 26 MMOL/L (ref 20–32)
CREAT SERPL-MCNC: 1.26 MG/DL (ref 0.52–1.04)
GFR SERPL CREATININE-BSD FRML MDRD: 44 ML/MIN/1.7M2
GLUCOSE SERPL-MCNC: 102 MG/DL (ref 70–99)
INTERPRETATION ECG - MUSE: NORMAL
INTERPRETATION ECG - MUSE: NORMAL
MAGNESIUM SERPL-MCNC: 2 MG/DL (ref 1.6–2.3)
POTASSIUM SERPL-SCNC: 4.6 MMOL/L (ref 3.4–5.3)
SODIUM SERPL-SCNC: 138 MMOL/L (ref 133–144)

## 2018-08-03 PROCEDURE — 25000132 ZZH RX MED GY IP 250 OP 250 PS 637: Performed by: INTERNAL MEDICINE

## 2018-08-03 PROCEDURE — 25000132 ZZH RX MED GY IP 250 OP 250 PS 637: Performed by: HOSPITALIST

## 2018-08-03 PROCEDURE — 84445 ASSAY OF TSI GLOBULIN: CPT | Performed by: HOSPITALIST

## 2018-08-03 PROCEDURE — 80048 BASIC METABOLIC PNL TOTAL CA: CPT | Performed by: HOSPITALIST

## 2018-08-03 PROCEDURE — 99232 SBSQ HOSP IP/OBS MODERATE 35: CPT | Performed by: INTERNAL MEDICINE

## 2018-08-03 PROCEDURE — 99239 HOSP IP/OBS DSCHRG MGMT >30: CPT | Performed by: HOSPITALIST

## 2018-08-03 PROCEDURE — 83735 ASSAY OF MAGNESIUM: CPT | Performed by: HOSPITALIST

## 2018-08-03 PROCEDURE — 36415 COLL VENOUS BLD VENIPUNCTURE: CPT | Performed by: HOSPITALIST

## 2018-08-03 PROCEDURE — 86376 MICROSOMAL ANTIBODY EACH: CPT | Performed by: HOSPITALIST

## 2018-08-03 PROCEDURE — 25000128 H RX IP 250 OP 636: Performed by: INTERNAL MEDICINE

## 2018-08-03 RX ORDER — FLECAINIDE ACETATE 150 MG/1
150 TABLET ORAL EVERY 12 HOURS
Qty: 60 TABLET | Refills: 2 | Status: SHIPPED | OUTPATIENT
Start: 2018-08-03 | End: 2018-08-03

## 2018-08-03 RX ORDER — FLECAINIDE ACETATE 150 MG/1
150 TABLET ORAL EVERY 12 HOURS
Qty: 180 TABLET | Refills: 2 | Status: SHIPPED | OUTPATIENT
Start: 2018-08-03 | End: 2018-10-26

## 2018-08-03 RX ORDER — METHIMAZOLE 5 MG/1
5 TABLET ORAL 3 TIMES DAILY
Qty: 90 TABLET | Refills: 2 | Status: SHIPPED | OUTPATIENT
Start: 2018-08-03 | End: 2018-08-03

## 2018-08-03 RX ADMIN — MAGNESIUM SULFATE HEPTAHYDRATE 2 G: 40 INJECTION, SOLUTION INTRAVENOUS at 10:58

## 2018-08-03 RX ADMIN — RANITIDINE 150 MG: 150 TABLET ORAL at 08:28

## 2018-08-03 RX ADMIN — METHIMAZOLE 5 MG: 5 TABLET ORAL at 08:28

## 2018-08-03 RX ADMIN — FLECAINIDE ACETATE 150 MG: 100 TABLET ORAL at 08:28

## 2018-08-03 ASSESSMENT — ACTIVITIES OF DAILY LIVING (ADL)
ADLS_ACUITY_SCORE: 9

## 2018-08-03 NOTE — PROGRESS NOTES
"  Cardiology Progress Note          Assessment and Plan:   Post repeat cardioversion on flecainide. She has remained in sinus rhythm. No apparent side effects from flecainide. ECG this morning looks fine. No complaints.    Admitted for atrial fib with RVR. RANDY and cardioversion on 7-. Recurrent atrial fib this am. Fast HR up to 190 bpm.  Reviewed echo image. Believe EF would be normal in sinus rhythm.  Initial TSH 4.78 (normal 0.4-4.0), free T3 2.2 (normal 2.3-4.2), free T4 1.65 (normal 0.76-1.46). Repeat TSH 1 day later was normal 3.5, free T4 slightly increased at 1.78. Ultrasound showed a 7 mm left midlateral lobe nodule. Was diagnosed to have hyperthyroidism and started on Tapazole 5 mg tid during this admission. Pending outpatient endocrinology consultation.      No health insurance. She is from the [facility] and has been living with her boy friend for 9 years, without a \"green card\".      She will need outpatient anticoagulation for 1 month and flecainide indefinitely for prevention of recurrent atrial fib. Would appreciate social service help.  Consider atrial fib ablation if she does not do well on flecainide, being aware that would be done only if she is readmited for recurrent atrial fib.  Cardiology follow up in 1 month (ordered).  Ok for discharge today. Sign off.    Quinn Quezada MD  Cardiology   [phone]                Diagnosis:     Patient Active Problem List   Diagnosis     Gastroesophageal reflux disease without esophagitis     Cough     Atrial fibrillation (H)                Physical Exam:   Blood pressure 97/66, pulse 70, temperature 98.5  F (36.9  C), temperature source Oral, resp. rate 16, height 1.626 m (5' 4\"), weight 68.3 kg (150 lb 8 oz), SpO2 97 %, not currently breastfeeding.  Wt Readings from Last 4 Encounters:   08/03/18 68.3 kg (150 lb 8 oz)   07/23/18 76.2 kg (168 lb)   07/05/18 78.2 kg (172 lb 4.8 oz)   07/01/18 76.5 kg (168 lb 9.6 oz)      I/O last 3 completed shifts:  In: " 1050 [P.O.:600; I.V.:450]  Out: 850 [Urine:850]    Constitutional: Alert, no apparent distress,    Lungs: No crackles or wheezing,    Cardiovascular: Regular rate and rhythm, normal S1 and S2, and no murmur,    Lymphm node  Neck  ENT  Neurologic  Abdomen: No enlargement  No jugular vein extension or carotid bruit  No apparent abnormality  No focal deficit  Normal bowel sounds, soft, no distension, no tender   Skin: No rashes, no cyanosis   Extremity: No edema          Medications:     Current Facility-Administered Medications:      acetaminophen (TYLENOL) Suppository 650 mg, 650 mg, Rectal, Q4H PRN, Philippe Ureña DO     acetaminophen (TYLENOL) tablet 650 mg, 650 mg, Oral, Q4H PRN, Philippe Ureña DO, 650 mg at 08/02/18 1325     albuterol (PROAIR HFA/PROVENTIL HFA/VENTOLIN HFA) Inhaler 2 puff, 2 puff, Inhalation, Q6H PRN, Philippe Ureña DO     albuterol neb solution 2.5 mg, 2.5 mg, Nebulization, Q4H PRN, Janet Armas MD     bisacodyl (DULCOLAX) Suppository 10 mg, 10 mg, Rectal, Daily PRN, Philippe Ureña DO     fentaNYL (PF) (SUBLIMAZE) injection 25-50 mcg, 25-50 mcg, Intravenous, Q2 Min PRN, Janet Armas MD     flecainide (TAMBOCOR) tablet 150 mg, 150 mg, Oral, Q12H RAHEEL, Quinn Quezada MD, 150 mg at 08/03/18 0828     Give 1/2 of usual dose of LONG ACTING insulin AM of procedure IF diabetic, , Does not apply, Continuous PRN, Quinn Quezada MD     HOLD Digoxin (Lanoxin) AM of procedure IF on digoxin, , Does not apply, HOLDPilar Huagui, MD     HOLD Insulin - RAPID/SHORT acting AM of procedure IF diabetic, , Does not apply, Pilar GALICIA Huagui, MD     HOLD Insulin - REGULAR AM of procedure IF diabetic, , Does not apply, Pilar GALICIA Huagui, MD     HOLD Oral hypoglycemics AM of procedure IF diabetic, , Does not apply, Pilar GALICIA Huagui MD     hydrALAZINE (APRESOLINE) injection 2.5-5 mg, 2.5-5 mg, Intravenous, Q10 Min PRN, Janet Armas MD     HYDROmorphone (PF) (DILAUDID) injection  0.3-0.5 mg, 0.3-0.5 mg, Intravenous, Q10 Min PRN, Janet Armas MD     lactated ringers infusion, 500 mL, Intravenous, Continuous, Janet Armas MD, Last Rate: 25 mL/hr at 08/02/18 1025, 500 mL at 08/02/18 1025     lactated ringers infusion, , Intravenous, Continuous, Janet Armas MD, Stopped at 08/02/18 1315     magnesium sulfate 2 g in water intermittent infusion, 2 g, Intravenous, Once PRN, Quinn Quezada MD     magnesium sulfate 2 g in water intermittent infusion, 2 g, Intravenous, Daily PRN, Alfonso Long MD, 2 g at 07/30/18 0255     magnesium sulfate 4 g in 100 mL sterile water (premade), 4 g, Intravenous, Q4H PRN, Alfonso Long MD     May take oral meds with sip of water, the morning of Cardioversion procedure., , Does not apply, Continuous PRN, Quinn Quezada MD     melatonin tablet 1 mg, 1 mg, Oral, At Bedtime PRN, Philippe Ureña DO     meperidine (DEMEROL) injection 12.5 mg, 12.5 mg, Intravenous, Q15 Min PRN, Janet Armas MD     methimazole (TAPAZOLE) tablet 5 mg, 5 mg, Oral, TID, Ralph Real, DO, 5 mg at 08/03/18 0828     metoprolol (LOPRESSOR) injection 2.5-5 mg, 2.5-5 mg, Intravenous, Q4H PRN, Philippe Ureña DO, 2.5 mg at 08/01/18 1859     metoprolol tartrate (LOPRESSOR) tablet 50 mg, 50 mg, Oral, BID, Ton Enrique MD, 50 mg at 08/02/18 2123     naloxone (NARCAN) injection 0.1-0.4 mg, 0.1-0.4 mg, Intravenous, Q2 Min PRN, Janet Armas MD     naloxone (NARCAN) injection 0.1-0.4 mg, 0.1-0.4 mg, Intravenous, Q2 Min PRN, Philippe Ureña, DO     ondansetron (ZOFRAN-ODT) ODT tab 4 mg, 4 mg, Oral, Q30 Min PRN **OR** ondansetron (ZOFRAN) injection 4 mg, 4 mg, Intravenous, Q30 Min PRN, Janet Armas MD     ondansetron (ZOFRAN-ODT) ODT tab 4 mg, 4 mg, Oral, Q6H PRN, 4 mg at 08/02/18 0534 **OR** ondansetron (ZOFRAN) injection 4 mg, 4 mg, Intravenous, Q6H PRN, Philippe Ureña, DO     ORAL Pain Medications -  may administer as  ordered by surgeon for take home use, , Does not apply, Continuous PRN, Janet Armas MD     polyethylene glycol (MIRALAX/GLYCOLAX) Packet 17 g, 17 g, Oral, Daily PRN, Philippe Ureña DO     potassium chloride (KLOR-CON) Packet 20-40 mEq, 20-40 mEq, Oral or Feeding Tube, Q2H PRN, Alfonso Long MD     potassium chloride 10 mEq in 100 mL intermittent infusion with 10 mg lidocaine, 10 mEq, Intravenous, Q1H PRN, Alfonso Long MD     potassium chloride 10 mEq in 100 mL sterile water intermittent infusion (premix), 10 mEq, Intravenous, Q1H PRN, Alfonso Long MD     potassium chloride 20 mEq in 50 mL intermittent infusion, 20 mEq, Intravenous, Q1H PRN, Alfonso Long MD     potassium chloride SA (K-DUR/KLOR-CON M) CR tablet 20 mEq, 20 mEq, Oral, Once PRN, Quinn Quezada MD     potassium chloride SA (K-DUR/KLOR-CON M) CR tablet 20-40 mEq, 20-40 mEq, Oral, Q2H PRN, Alfonso Long MD, 20 mEq at 07/28/18 1546     potassium chloride SA (K-DUR/KLOR-CON M) CR tablet 40 mEq, 40 mEq, Oral, Once PRN, Quinn Quezada MD     prochlorperazine (COMPAZINE) injection 10 mg, 10 mg, Intravenous, Q6H PRN, Janet Armas MD     ranitidine (ZANTAC) tablet 150 mg, 150 mg, Oral, Daily, Philippe Ureña DO, 150 mg at 08/03/18 0828     Reason ACE/ARB/ARNI order not selected, , Other, DOES NOT GO TO Maria Esther GILLIS Paul, MD     rivaroxaban ANTICOAGULANT (XARELTO) tablet 20 mg, 20 mg, Oral, Daily with Klaus sewell Eric Ryan, MD, 20 mg at 08/02/18 1645     senna-docusate (SENOKOT-S;PERICOLACE) 8.6-50 MG per tablet 1 tablet, 1 tablet, Oral, BID PRN **OR** senna-docusate (SENOKOT-S;PERICOLACE) 8.6-50 MG per tablet 2 tablet, 2 tablet, Oral, BID PRN, Philippe Ureña DO     sodium chloride (PF) 0.9% PF flush 3 mL, 3 mL, Intravenous, Q1H PRN, Quinn Quezada MD     sodium chloride (PF) 0.9% PF flush 3 mL, 3 mL, Intravenous, Q8H, Quinn Quezada MD, 3 mL at 08/02/18 [facility]8           Lab  results:        Recent Labs   Lab Test  08/03/18   0537  08/02/18   0557  08/01/18   1345  08/01/18   0550   NA  138  137   --   132*   POTASSIUM  4.6  4.1  4.4  5.6*   CHLORIDE  104  104   --   101   ANKUSH  8.7  8.7   --   9.0   CO2  26  22   --   22   BUN  25  28   --   23   CR  1.26*  1.21*   --   1.11*   GLC  102*  121*   --   104*     Recent Labs   Lab Test  07/27/18   0516  07/26/18   0538  07/23/18   1016   HGB  13.9  14.8  14.8   WBC  7.3  8.6  7.5   PLT  161  171  181     Recent Labs   Lab Test  08/02/18   0557  07/31/18   0551  07/30/18   0626   INR  3.51*  1.61*  1.70*     Recent Labs   Lab Test  07/26/18   1308  07/26/18   0946  07/26/18   0538   TROPI  <0.015  <0.015  <0.015

## 2018-08-03 NOTE — PROGRESS NOTES
VSS. Tele shows SR. Pt denies any CP or SOB. Discharge instructions, medication indications/side effects, and follow up instructions discussed w/ pt and significant other. Handouts given. All questions answered. All pt belongings are accounted for and sent home w/ pt. Pt left floor via foot and was driven home by significant other.

## 2018-08-03 NOTE — PROGRESS NOTES
Care Coordination:    -Met with pt again today. Pt has concerns about medication costs. Per previous note medications charges were listed incorrectly. Per discharge liaison Ivette a coupon which [place] does not support was used. The new cost for flecainide is $242.39 for a 3 month supply and ranitidine will cost $11.21 for a 1 month supply.  Due to her financial constraints we are able to offer [facility] to cover her medications and a scale. The scale was given to the pt. The discharging nurse will give the pt her medications.     Sunitha Salcido RN, BAN   Care Coordinator  Ph. [phone]

## 2018-08-03 NOTE — PROGRESS NOTES
Care Coordination:    -Changed appt per MD request. Pt needs to be seen within one month.     Sep 07, 2018 10:20 AM CDT   [facility] S[facility]   [facility]    [address] [place] [id number] [phone]            Sunitha Salcido RN, [facility]   Care Coordinator  Ph. [phone]

## 2018-08-03 NOTE — DISCHARGE INSTRUCTIONS
A follow-up appointment was scheduled for you [see above].  It is very important to go to it--bring these papers and a form of payment with you.  At the visit, talk about your hospital stay.  Tell your doctor how you feel.  Show your new list of medications.  Your doctor will update records, make sure you are still doing OK, and decide if any tests or medication changes are needed.      Please bring $75 to the [facility] appointment, which is needed upfront to be seen.   + The remainder appointment cost will be billed to you. The cost is likely to be similar to your prior appointments there.     The expected total cost of the Heart (CORE) appointment is estimaged $294-$397.  + There is not an upfront cost to this appointment--everything will be billed to your address  + Talk about continuing Xarelto or switching to Warfarin at this appointment.     The expected cost of the Cardiology appointment is   +  a $265 facility charge  +  plus the MD charge (depending on the complexity of your case)       Level 1 $120, Level 2 $201, Level 3 $295.        Once your Flecainide gets close to running out, . you can transfer you prescription to  [facility] ~$37.40. The closest [facility] is in [place] at [phone];  [place] [phone].

## 2018-08-03 NOTE — PHARMACY
Medication cost inquiry for methimazole 5mg TID. $32 for one month's supply (with coupon).  Ivette Barker CphT  [facility] Pharm[facility] Liaison  [employer] Cell: [phone]

## 2018-08-03 NOTE — DISCHARGE SUMMARY
[facility]    Discharge Summary  Hospitalist    Date of Admission:  7/26/2018  Date of Discharge:  8/3/2018  Discharging Provider: Kali Mayo  Date of Service (when I saw the patient): 08/03/18    Discharge Diagnoses   Recurrent A-fib with RVR  Acute CHF exacerbation  RAMONE stage I  Hyperkalemia  Abdominal ascites   Possible hyperthyroidism  GERD    History of Present Illness   Christ Servin is an 58 year old female who presented with shortness of breath.    Hospital Course   Christ Servin was admitted on 7/26/2018.  The following problems were addressed during her hospitalization:    Recurrent Atrial fibrillation with rapid ventricular rate:   New diagnosis for the patient and associated with shortness of breath, see acute CHF exacerbation below.  Notably she does have evidence of possible hyperthyroidism (see below).  No improvement with titrate of BB, underwent RANDY with cardioversion 7/30 but had recurrence 8/1 now s/p repeat CV 8/2.  She will discharge on flecainide 150 mg bid indefinitely.  She will receive Xarelto anticoagulation x1 month and follow up in EP clinic.       Acute congestive heart failure exacerbation:   Suspect this is related to the atrial fibrillation.  Echo shows preserved EF of 45-50% with moderate global hypokinesia, decreased RV function, dilated atria, dilated IVC, pulmonary hypertension and mod/severe TR with a small pericardial effusion.  Briefly on lisinopril but then held due to soft blood pressures and RAMONE, may consider adding as outpatient.      Acute kidney injury, stage II:   Patient with a creatinine of 1.20 on admission, elevated from baseline of approximately 1.04.  Cr remained stable at baseline at 1.2.      Hyperkalemia: resolved  K elevated on 7/31 and 8/1, likely due to supplemental K which has since been discontinued.        Abdominal ascites   Patient with CT Abd completed noting ascites. U/s ruq completed 7/23 showing prior cholecystectomy, and no  evidence for fatty infiltration. ALT elevated on 7/1, but returned to wnl by 7/27.  Bili remained mildly elevated.  Question if due to volume overload with CHF.        Possible hyperthyroidism:   TSH and free T4 elevated at admission.  Repeat studies 7/27 with normal TSH, mildly elevated free T4, minimally low free T3.  Thyroid US shows a 7mm nodule in mid lateral left lobe.  Was initially placed on methimazole with concern this was contributing to A-fib.  Discussed with Endocrinology, who do not feel labs are convincing for hyperthyroidism.  There is no family history of thyroid disease, no recent viral illness or neck tenderness to suggest thyroiditis.  No headache or visual changes to suggest pituitary pathology.  Will order thyroid peroxidase and thyroid stimulating immunoglobulin (drawn prior to discharge) and repeat thyroid studies with PCP in 1 month.  Will discontinue methimazole.        GERD:  Continue ranitidine (dose reduced to daily).      # Discharge Pain Plan:   - Patient currently has NO PAIN and is not being prescribed pain medications on discharge.        Kali Mayo    Significant Results and Procedures   See imaging below    Pending Results   These results will be followed up by: N/A  Unresulted Labs Ordered in the Past 30 Days of this Admission     No orders found from 5/27/2018 to 7/27/2018.          Code Status   Full Code       Primary Care Physician   [facility]    Constitutional: well developed, well nourished female in no acute distress  Respiratory: lungs clear to auscultation bilaterally, no crackles or wheezes, no tachypnea  Cardiovascular: regular rate and rhythm, normal S1/S2, no murmur, rubs or gallops  GI: abdomen soft, non-tender, non-distended, normal bowel sounds  Lymph/Hematologic: No peripheral edema  Musculoskeletal: Extremities warm and well perfused  Neurologic: alert and appropriate, cranial nerves grossly intact, gross motor movements intact  Psychiatric:  normal affect    Discharge Disposition   Discharged to home  Condition at discharge: Stable    Consultations This Hospital Stay   CARDIOLOGY IP CONSULT  PHARMACY TO DOSE HEPARIN  [facility] EVALUATION IP CONSULT  NUTRITION SERVICES ADULT IP CONSULT  CARE COORDINATOR IP CONSULT  CARE TRANSITION RN/SW IP CONSULT  PHARMACY LIAISON FOR MEDICATION COVERAGE CONSULT  PHARMACY TO DOSE WARFARIN  CARDIAC REHAB IP CONSULT  CARE TRANSITION RN/SW IP CONSULT    Time Spent on this Encounter   I, Kali Mayo, personally saw the patient today and spent greater than 30 minutes discharging this patient.    Discharge Orders     Basic metabolic panel     N terminal pro BNP outpatient     Follow-Up with Cardiologist     Follow-Up with [facility]     Follow-Up with Cardiac Advanced Practice Provider     Reason for your hospital stay   You were admitted for shortness of breath due to atrial fibrillation (abnormal heart rhythm) with high heart rates and congestive heart failure.     Activity   Your activity upon discharge: activity as tolerated     Monitor and record   weight every day     Follow-up and recommended labs and tests    Follow up with primary care provider, [facility], in 1 month for hospital follow- up.  Follow up labs and tests:  TSH and free T4     Full Code     Diet   Follow this diet upon discharge:      2 Gram Sodium Diet       Discharge Medications     Allergies   No Known Allergies  Data   Most Recent 3 CBC's:  Recent Labs   Lab Test  07/27/18   0516  07/26/18   0538  07/23/18   1016   WBC  7.3  8.6  7.5   HGB  13.9  14.8  14.8   MCV  93  94  93   PLT  161  171  181      Most Recent 3 BMP's:  Recent Labs   Lab Test  08/03/18   0537  08/02/18   0557  08/01/18   1345  08/01/18   0550   NA  138  137   --   132*   POTASSIUM  4.6  4.1  4.4  5.6*   CHLORIDE  104  104   --   101   CO2  26  22   --   22   BUN  25  28   --   23   CR  1.26*  1.21*   --   1.11*   ANIONGAP  8  11   --   9   ANKUSH  8.7  8.7   --   9.0    GLC  102*  121*   --   104*     Most Recent 2 LFT's:  Recent Labs   Lab Test  07/27/18   0516  07/26/18   0538   AST  31  41   ALT  48  51*   ALKPHOS  103  101   BILITOTAL  1.8*  1.5*     Most Recent INR's and Anticoagulation Dosing History:  Anticoagulation Dose History     Recent Dosing and Labs Latest Ref Rng & Units 7/28/2018 7/29/2018 7/30/2018 7/30/2018 7/30/2018 7/31/2018 8/2/2018    Warfarin 5 mg - - - - - 5 mg - -    Warfarin 7.5 mg - 7.5 mg 7.5 mg - - - - -    INR 0.86 - 1.14 1.07 1.10 1.57(H) 1.70(H) - 1.61(H) 3.51(H)        Most Recent 3 Troponin's:  Recent Labs   Lab Test  07/26/18   1308  07/26/18   0946  07/26/18   0538   TROPI  <0.015  <0.015  <0.015     Most Recent TSH, T4 and A1c Labs:  Recent Labs   Lab Test  07/27/18   0516   TSH  3.50   T4  1.78*     Results for orders placed or performed during the hospital encounter of 07/26/18   Chest XR,  PA & LAT    Narrative    XR CHEST 2 VW 7/26/2018 7:26 AM    COMPARISON: 7/1/2018    HISTORY: Chest pain and shortness of breath while lying down.      Impression    IMPRESSION: Small RIGHT and trace LEFT pleural effusions, slightly  increased on the RIGHT and unchanged on the LEFT since 7/1/2018. There  is associated atelectasis. No pneumothorax seen on either side.    YE GONZALEZ MD   US Thyroid    Narrative    ULTRASOUND THYROID  7/26/2018 12:12 PM     COMPARISON: None.    HISTORY: Elevated TSH and T4. Rule out tumor.     FINDINGS: The right lobe measures 4.3 x 1.2 x 1.6 cm. The left lobe  measures 3.7 x 1.3 x 1.7 cm. The isthmus is mildly thickened at 5 mm.  Thyroid parenchyma is mildly heterogeneous and hypoechoic in  echotexture.    Thyroid nodules as follows:     1. 7 x 7 x 7 mm nodule in the mid lateral left lobe.      Impression    IMPRESSION: Heterogeneously hypoechoic thyroid with a single  indeterminate nodule measuring 7 mm.    RADHA VILLA MD   XR Chest 2 Views    Narrative    CHEST TWO VIEWS  7/28/2018 8:35 AM     HISTORY:  Pulmonary  edema. Heart failure.    COMPARISON: 2018.      Impression    IMPRESSION: Small bilateral pleural effusions, larger on the right,  have improved slightly. Mild associated infiltrate or atelectasis at  the right lung base has also improved slightly. Cardiac enlargement is  again noted. Pulmonary vascularity is within normal limits. No  pneumothorax.    SUDHEER JAVED MD     Transesophageal Echocardiogram [[id number]] Collected: 18 0854   Order Status: Completed Updated: 18 1009   Narrative:     [id number]  [facility]  [id number]  [id number]^FLOWER^JESSE^DAVINA [facility]  Echocardiography Laboratory  [address] [place] [address]        Name: MARÍA MAURICIO  MRN: [id number]  : 1960  Study Date: 2018 08:54 AM  Age: 58 yrs  Gender: Female  Patient Location: [facility]  Reason For Study: Afib  Ordering Physician: JESSE CRENSHAW  Referring Physician: DON FREDERICK  Performed By: Brittney Gamez     BSA: 1.7 m2  Height: 64 in  Weight: 152 lb  HR: 133  BP: 113/95 mmHg  _____________________________________________________________________________  __        Procedure  Complete RANDY Adult. 3D image acquisition, reconstruction, and real-time  interpretation was performed. RANDY Probe serial #[id number] was used during the  procedure.  _____________________________________________________________________________  __        Interpretation Summary     No thrombus is detected in the left atrial appendage.  Left ventricular systolic function is moderately reduced.  The visual ejection fraction is estimated at 30-35%.  There is moderate to mod-severe (2-3+) mitral regurgitation.  The study was technically difficult.  _____________________________________________________________________________  __        RANDY  Versed (2mg) was given intravenously. Fentanyl (50mcg) was given  intravenously. I determined this patient to be an appropriate candidate for  the planned sedation and procedure and  have reassessed the patient immediately  prior to sedation and procedure. Total sedation time: 25 minutes of continuous  bedside 1:1 monitoring. Consent to the procedure was obtained prior to  sedation. Prior to the exam, the oral cavity was checked and no overcrowding  was noted. The transesophageal probe was passed without difficulty. There were  no complications associated with this procedure.     Left Ventricle  The left ventricle is normal in size. There is normal left ventricular wall  thickness. Left ventricular systolic function is moderately reduced. The  visual ejection fraction is estimated at 30-35%. There is severe global  hypokinesia of the left ventricle.     Right Ventricle  The right ventricle is moderately dilated. Moderately decreased right  ventricular systolic function.     Atria  The left atrium is mild to moderately dilated. The right atrium is moderately  dilated. Left to right atrial shunt, small. A patent foramen ovale is  suspected. No thrombus is detected in the left atrial appendage.        Mitral Valve  The mitral valve leaflets are mildly thickened. There is no evidence of mitral  valve prolapse. There is moderate to mod-severe (2-3+) mitral regurgitation.  No systolic reversal on doppler of pulmonary veins. Two jets of MR are seen,  directed inferolaterally. The regurgitant volume of one of the jets is 20ml.     Tricuspid Valve  There is mild to moderate (1-2+) tricuspid regurgitation. The right  ventricular systolic pressure is approximated at 12.2 mmHg plus the right  atrial pressure.     Aortic Valve  The aortic valve is trileaflet. No aortic regurgitation is present. No aortic  stenosis is present.     Pulmonic Valve  The pulmonic valve is not well visualized. There is no pulmonic valvular  regurgitation.     Vessels  The aortic root is normal size.        Pericardial/Pleural  There is no pericardial effusion.     Rhythm  The rhythm was rapid atrial  fibrillation.  _____________________________________________________________________________  __           Doppler Measurements & Calculations  TR max jim: 174.3 cm/sec  TR max P.2 mmHg           _____________________________________________________________________________  __           Report approved by: Cliff Harkins 2018 10:08 AM

## 2018-08-03 NOTE — PLAN OF CARE
Problem: Patient Care Overview  Goal: Plan of Care/Patient Progress Review  Outcome: Improving  Pt A/Ox4, pt and significant other have questions about how long pt be on Xarelto and Flecainide. SO Lloyd ([phone]) would like to speak with MD prior to discharge and Care Coordinator about the cost.

## 2018-08-03 NOTE — PLAN OF CARE
"Problem: Cardiac: Heart Failure (Adult)  Goal: Signs and Symptoms of Listed Potential Problems Will be Absent, Minimized or Managed (Cardiac: Heart Failure)  Signs and symptoms of listed potential problems will be absent, minimized or managed by discharge/transition of care (reference Cardiac: Heart Failure (Adult) CPG).   Outcome: No Change  Pt A/O x4. Up independently in room. VSS with soft BP. Tele shows SR. Switched to A fib morning of 8/2, cardioverted successfully. Started on flecainide and xarelto. Received free 3 month supply of xarelto,  would like to talk to doctor and social work about medication pricing and duration. Note states needing flecainide indefinitely. Currently resting comfortably in room.     BP 90/57 (BP Location: Left arm)  Pulse 68  Temp 98.9  F (37.2  C) (Oral)  Resp 18  Ht 1.626 m (5' 4\")  Wt 68 kg (150 lb)  SpO2 97%  BMI 25.75 kg/m2        "

## 2018-08-06 ENCOUNTER — TELEPHONE (OUTPATIENT)
Dept: INTERNAL MEDICINE | Facility: CLINIC | Age: 58
End: 2018-08-06

## 2018-08-06 ENCOUNTER — TELEPHONE (OUTPATIENT)
Dept: CARDIOLOGY | Facility: CLINIC | Age: 58
End: 2018-08-06

## 2018-08-06 ENCOUNTER — PATIENT OUTREACH (OUTPATIENT)
Dept: CARE COORDINATION | Facility: CLINIC | Age: 58
End: 2018-08-06

## 2018-08-06 LAB — THYROPEROXIDASE AB SERPL-ACNC: 10 IU/ML

## 2018-08-06 NOTE — TELEPHONE ENCOUNTER
ED / Discharge Outreach Protocol    Patient Contact    Attempt # 1    Was call answered?  No.  Unable to leave message.  Mailbox not set up.

## 2018-08-06 NOTE — TELEPHONE ENCOUNTER
Patient was evaluated by cardiology while inpatient for shortness of breath. New diagnosis of A. Fib with RVR, hyperthyroidism and CHF. DCCV x 2, which resulted in NSR. Started on Flecainide and Xarelto at time of discharge. Called patient to discuss any post hospital d/c questions she may have, review medication changes, and confirm f/u appts, but no answer and mailbox has not been set up. Will try again later. Will need to complete CHF teaching. GERARD Jimenez RN.

## 2018-08-06 NOTE — PROGRESS NOTES
Clinic Care Coordination Contact  [facility]/Voicemail    Referral Source: IP Handoff  [facility] admission  Date of Admission:  7/26/2018  Date of Discharge:  8/3/2018  Discharge Diagnoses: Recurrent A-fib with RVR, Acute CHF exacerbation, RAMONE stage I, Hyperkalemia, Abdominal ascites, Possible hyperthyroidism, GERD    Clinical Data: Care Coordinator Outreach  Outreach attempted x 1.  Unable to leave message as inbox is not set up.  Plan: Care Coordinator will try to reach patient again in 1-2 business days.    Mango Levy RN  Clinic Care Coordinator  [facility]  Ph: [phone]

## 2018-08-07 NOTE — TELEPHONE ENCOUNTER
ED / Discharge Outreach Protocol    Patient Contact    Attempt # 3    Was call answered?  No.  Unable to leave message.

## 2018-08-08 ENCOUNTER — PATIENT OUTREACH (OUTPATIENT)
Dept: CARE COORDINATION | Facility: CLINIC | Age: 58
End: 2018-08-08

## 2018-08-08 LAB — TSI SER-ACNC: 1 TSI INDEX

## 2018-08-08 NOTE — PROGRESS NOTES
Clinic Care Coordination Contact  [facility]/Voicemail     Referral Source: IP Handoff  [facility] admission  Date of Admission:  7/26/2018  Date of Discharge:  8/3/2018  Discharge Diagnoses: Recurrent A-fib with RVR, Acute CHF exacerbation, RAMONE stage I, Hyperkalemia, Abdominal ascites, Possible hyperthyroidism, GERD     Clinical Data: Care Coordinator Outreach  Outreach attempted x 2. Unable to leave voicemail as inbox is not set up.  Plan: Care Coordinator will try to reach patient again in 1-2 business days.     Mango Levy RN  Clinic Care Coordinator  [facility]  Ph: [phone]

## 2018-08-08 NOTE — TELEPHONE ENCOUNTER
Second attempt at trying to call pt for discharge phone call, and again no answer and inability to leave VM. GERARD Jimenez RN.

## 2018-08-09 ENCOUNTER — PATIENT OUTREACH (OUTPATIENT)
Dept: CARE COORDINATION | Facility: CLINIC | Age: 58
End: 2018-08-09

## 2018-08-09 NOTE — TELEPHONE ENCOUNTER
Third attempt at trying to reach pt via phone call, and again no answer and inability to leave VM. Will close this encounter. GERARD Jimenez RN.

## 2018-08-09 NOTE — LETTER
Health Care Home - Access Care Plan    About Me  Patient Name:  Christ Servin    YOB: 1960  Age:                             58 year old   Martha MRN:            [id number] Telephone Information:     Home Phone [phone]   Mobile [phone]       Address:    [address]  [place] [address] Email address:  [email or web address]      Emergency Contact(s)  Name Relationship Lgl Grd Work Phone Home Phone Mobile Phone   1. MADI GARCIA Significant ot* No  [phone]             Health Maintenance:      My Access Plan  Medical Emergency 911   Questions or concerns during clinic hours Primary Clinic Line, I will call the clinic directly:   -     24 Hour Appointment Line [phone] or  [phone] [phone]) (toll free)   24 Hour Nurse Line [phone] (toll free)   Questions or concerns outside clinic hours 24 Hour Appointment Line, I will call the after-hours on-call line:   [place] [phone] or [phone]) (toll-free)   Preferred Urgent Care     Preferred Hospital [facility]  [phone]   Preferred Pharmacy No Pharmacies Listed   Behavioral Health Crisis Line The National Suicide Prevention Lifeline at 1-242.691.7089 or 916     My Care Team Members  Patient Care Team       Relationship Specialty Notifications Start End    Clinic, [facility] PCP - General   7/28/18     Phone: [phone] Fax: [phone] [address] [address]           My Medical and Care Information  Problem List   Patient Active Problem List   Diagnosis     Gastroesophageal reflux disease without esophagitis     Cough     Atrial fibrillation (H)      Current Medications and Allergies:  See Medication Report below.   Current Outpatient Prescriptions   Medication     albuterol (PROAIR HFA/PROVENTIL HFA/VENTOLIN HFA) 108 (90 Base) MCG/ACT Inhaler     bismuth subsalicylate (PEPTO BISMOL) 262 MG/15ML  suspension     flecainide acetate 150 MG TABS     ranitidine (ZANTAC) 150 MG tablet     rivaroxaban ANTICOAGULANT (XARELTO) 15 MG TABS tablet     No current facility-administered medications for this visit.

## 2018-08-09 NOTE — LETTER
[place] CARE COORDINATION  [facility]  [address], [place] [address]    August 9, 2018    Christ Servin  [address]  [place] [facility] [place] [place] [address]      Dear Christ,    I am a clinic care coordinator who works with [facility]. I recently tried to call and was unable to reach you. I wanted to introduce myself and provide you with my contact information so that you can call me with questions or concerns about your health care. Below is a description of clinic care coordination and how I can further assist you.     The clinic care coordinator is a registered nurse and/or  who understand the health care system. The goal of clinic care coordination is to help you manage your health and improve access to the [facility] system in the most efficient manner. The registered nurse can assist you in meeting your health care goals by providing education, coordinating services, and strengthening the communication among your providers. The  can assist you with financial, behavioral, psychosocial, chemical dependency, counseling, and/or psychiatric resources.    Please feel free to contact me at [phone] with any questions or concerns. We at [facility] are focused on providing you with the highest-quality healthcare experience possible and that all starts with you.     Sincerely,     Mango Levy RN  Clinic Care Coordinator  [facility]  Ph: [phone]    Enclosed: I have enclosed a copy of a 24 Hour Access Plan. This has helpful phone numbers for you to call when needed. Please keep this in an easy to access place to use as needed.

## 2018-08-09 NOTE — PROGRESS NOTES
Clinic Care Coordination Contact  [facility]/Voicemail    Referral Source: IP Handoff  Date of Admission:  7/26/2018  Date of Discharge:  8/3/2018  Discharge Diagnoses: Recurrent A-fib with RVR, Acute CHF exacerbation, RAMONE stage I, Hyperkalemia, Abdominal ascites, Possible hyperthyroidism, GERD    Clinical Data: Care Coordinator Outreach  Outreach attempted x 3.  Unable to leave voicemail as inbox is not set up.  Plan: Care Coordinator will mail out care coordination introduction letter with care coordinator contact information and explanation of care coordination services. Care Coordinator will do no further outreaches at this time.    Mango Levy, RN  Clinic Care Coordinator  [facility]  Ph: [phone]

## 2018-08-14 LAB — INTERPRETATION ECG - MUSE: NORMAL

## 2018-09-07 ENCOUNTER — OFFICE VISIT (OUTPATIENT)
Dept: INTERNAL MEDICINE | Facility: CLINIC | Age: 58
End: 2018-09-07

## 2018-09-07 VITALS
RESPIRATION RATE: 12 BRPM | SYSTOLIC BLOOD PRESSURE: 124 MMHG | HEART RATE: 74 BPM | TEMPERATURE: 98.1 F | BODY MASS INDEX: 27.07 KG/M2 | OXYGEN SATURATION: 100 % | WEIGHT: 157.7 LBS | DIASTOLIC BLOOD PRESSURE: 84 MMHG

## 2018-09-07 DIAGNOSIS — I48.91 ATRIAL FIBRILLATION, UNSPECIFIED TYPE (H): ICD-10-CM

## 2018-09-07 DIAGNOSIS — Z13.220 LIPID SCREENING: ICD-10-CM

## 2018-09-07 DIAGNOSIS — Z09 HOSPITAL DISCHARGE FOLLOW-UP: Primary | ICD-10-CM

## 2018-09-07 LAB
ALBUMIN SERPL-MCNC: 3.8 G/DL (ref 3.4–5)
ALP SERPL-CCNC: 140 U/L (ref 40–150)
ALT SERPL W P-5'-P-CCNC: 60 U/L (ref 0–50)
ANION GAP SERPL CALCULATED.3IONS-SCNC: 7 MMOL/L (ref 3–14)
AST SERPL W P-5'-P-CCNC: 43 U/L (ref 0–45)
BILIRUB SERPL-MCNC: 0.6 MG/DL (ref 0.2–1.3)
BUN SERPL-MCNC: 15 MG/DL (ref 7–30)
CALCIUM SERPL-MCNC: 9.3 MG/DL (ref 8.5–10.1)
CHLORIDE SERPL-SCNC: 102 MMOL/L (ref 94–109)
CHOLEST SERPL-MCNC: 223 MG/DL
CO2 SERPL-SCNC: 30 MMOL/L (ref 20–32)
CREAT SERPL-MCNC: 0.83 MG/DL (ref 0.52–1.04)
GFR SERPL CREATININE-BSD FRML MDRD: 71 ML/MIN/1.7M2
GLUCOSE SERPL-MCNC: 102 MG/DL (ref 70–99)
HDLC SERPL-MCNC: 52 MG/DL
LDLC SERPL CALC-MCNC: 110 MG/DL
NONHDLC SERPL-MCNC: 171 MG/DL
NT-PROBNP SERPL-MCNC: 244 PG/ML (ref 0–125)
POTASSIUM SERPL-SCNC: 4.1 MMOL/L (ref 3.4–5.3)
PROT SERPL-MCNC: 8.2 G/DL (ref 6.8–8.8)
SODIUM SERPL-SCNC: 139 MMOL/L (ref 133–144)
T4 FREE SERPL-MCNC: 0.99 NG/DL (ref 0.76–1.46)
TRIGL SERPL-MCNC: 303 MG/DL
TSH SERPL DL<=0.005 MIU/L-ACNC: 1.7 MU/L (ref 0.4–4)

## 2018-09-07 PROCEDURE — 99214 OFFICE O/P EST MOD 30 MIN: CPT | Performed by: PHYSICIAN ASSISTANT

## 2018-09-07 PROCEDURE — 80053 COMPREHEN METABOLIC PANEL: CPT | Performed by: PHYSICIAN ASSISTANT

## 2018-09-07 PROCEDURE — 80061 LIPID PANEL: CPT | Performed by: PHYSICIAN ASSISTANT

## 2018-09-07 PROCEDURE — 83880 ASSAY OF NATRIURETIC PEPTIDE: CPT | Performed by: PHYSICIAN ASSISTANT

## 2018-09-07 PROCEDURE — 84439 ASSAY OF FREE THYROXINE: CPT | Performed by: PHYSICIAN ASSISTANT

## 2018-09-07 PROCEDURE — 36415 COLL VENOUS BLD VENIPUNCTURE: CPT | Performed by: PHYSICIAN ASSISTANT

## 2018-09-07 PROCEDURE — 84443 ASSAY THYROID STIM HORMONE: CPT | Performed by: PHYSICIAN ASSISTANT

## 2018-09-07 NOTE — PROGRESS NOTES
SUBJECTIVE:   Christ Servin is a 58 year old female who presents to clinic today for hospital follow up:    Review of chart, shows patient was hospitalized from 7/26/2018 8/3/2018.  Patient was diagnosed with recurrent atrial fibrillation with RVR, acute CHF exasperation, acute kidney injury stage I, abdominal ascites, possible hyperthyroidism and GERD.  Discharge summary as below:    Hospital Course     Christ Servin was admitted on 7/26/2018.  The following problems were addressed during her hospitalization:     Recurrent Atrial fibrillation with rapid ventricular rate:   New diagnosis for the patient and associated with shortness of breath, see acute CHF exacerbation below.  Notably she does have evidence of possible hyperthyroidism (see below).  No improvement with titrate of BB, underwent RANDY with cardioversion 7/30 but had recurrence 8/1 now s/p repeat CV 8/2.  She will discharge on flecainide 150 mg bid indefinitely.  She will receive Xarelto anticoagulation x1 month and follow up in EP clinic.       Acute congestive heart failure exacerbation:   Suspect this is related to the atrial fibrillation.  Echo shows preserved EF of 45-50% with moderate global hypokinesia, decreased RV function, dilated atria, dilated IVC, pulmonary hypertension and mod/severe TR with a small pericardial effusion.  Briefly on lisinopril but then held due to soft blood pressures and RAMONE, may consider adding as outpatient.      Acute kidney injury, stage II:   Patient with a creatinine of 1.20 on admission, elevated from baseline of approximately 1.04.  Cr remained stable at baseline at 1.2.      Hyperkalemia: resolved  K elevated on 7/31 and 8/1, likely due to supplemental K which has since been discontinued.        Abdominal ascites   Patient with CT Abd completed noting ascites. U/s ruq completed 7/23 showing prior cholecystectomy, and no evidence for fatty infiltration. ALT elevated on 7/1, but returned to wnl by 7/27.  Bili  remained mildly elevated.  Question if due to volume overload with CHF.        Possible hyperthyroidism:   TSH and free T4 elevated at admission.  Repeat studies 7/27 with normal TSH, mildly elevated free T4, minimally low free T3.  Thyroid US shows a 7mm nodule in mid lateral left lobe.  Was initially placed on methimazole with concern this was contributing to A-fib.  Discussed with Endocrinology, who do not feel labs are convincing for hyperthyroidism.  There is no family history of thyroid disease, no recent viral illness or neck tenderness to suggest thyroiditis.  No headache or visual changes to suggest pituitary pathology.  Will order thyroid peroxidase and thyroid stimulating immunoglobulin (drawn prior to discharge) and repeat thyroid studies with PCP in 1 month.  Will discontinue methimazole.        GERD:  Continue ranitidine (dose reduced to daily).        # Discharge Pain Plan:   - Patient currently has NO PAIN and is not being prescribed pain medications on discharge.    She reports she missed her appointment with the [facility] clinic and has not made follow-up since.  Patient has not seen cardiology since hospitalization or seen anyone in IM clinic.  Patient notes her weight has gone up from hospitalization, notes she has had increasing appetite since discharge.  Patient reports she has have morning congestion since leaving the hospital describes this as feeling like phlegm.  Pt denies Denies: SOB, GRAYSON, orthopnea, changes in abdomen, edema, temperature changes, and skin/hair changes. Pt reports no problems with medication       Problem list and histories reviewed & adjusted, as indicated.  Additional history: as documented      Reviewed and updated as needed this visit by clinical staff  Tobacco  Allergies  Meds  Problems  Med Hx  Surg Hx  Fam Hx  Soc Hx        Reviewed and updated as needed this visit by Provider  Meds  Problems           OBJECTIVE:     /84 (BP Location: Right arm, Patient  Position: Chair, Cuff Size: Adult Regular)  Pulse 74  Temp 98.1  F (36.7  C) (Oral)  Resp 12  Wt 157 lb 11.2 oz (71.5 kg)  SpO2 100%  BMI 27.07 kg/m2  Body mass index is 27.07 kg/(m^2).  GENERAL: healthy, alert and no distress  RESP: lungs clear to auscultation - no rales, rhonchi or wheezes  CV: regular rates and rhythm, normal S1 S2, no S3 or S4, no murmur, click or rub, peripheral pulses strong and no peripheral edema  ABDOMEN: soft, nontender, no hepatosplenomegaly, no masses and bowel sounds normal    ASSESSMENT/PLAN:       1. Hospital discharge follow-up  - reviewed records, pt reporting stable symptoms   - weight gain, but with no evidence of fluid overload and pt has noted significant change in appetite, will monitor and check BNP   - pt to schedule follow up in [facility] clinic     2. Atrial fibrillation, unspecified type (H)  - continue on xarelto   - rate well controlled   - check thyroid labs as they were abnormal in hospital   - BNP-N terminal pro  - Comprehensive metabolic panel  - T4, free  - TSH    3. Lipid screening  - Lipid panel reflex to direct LDL Fasting    Follow up in [facility] clinic.  Follow up in IM clinic in 1 month, pending lab results.  Pt agreed to plan and all questions are answered.    Sona Cueto PA-C  [facility]

## 2018-09-07 NOTE — MR AVS SNAPSHOT
After Visit Summary   9/7/2018    Christ Servin    MRN: [id number]           Patient Information     Date Of Birth          1960        Visit Information        Provider Department      9/7/2018 10:20 AM Sona Couch PA-C [facility]        Today's Diagnoses     Atrial fibrillation, unspecified type (H)    -  1    Lipid screening          Care Instructions    Follow up in 1 month           Follow-ups after your visit        Your next 10 appointments already scheduled     Oct 22, 2018  3:15 PM CDT   Return Visit with Arlet Dixon MD   [facility] ([facility])    [address] [place], [place] [id number]  [facility] [address] [phone] OPT 2              Who to contact     If you have questions or need follow up information about today's clinic visit or your schedule please contact [facility] directly at [phone].  Normal or non-critical lab and imaging results will be communicated to you by MyChart, letter or phone within 4 business days after the clinic has received the results. If you do not hear from us within 7 days, please contact the clinic through MyChart or phone. If you have a critical or abnormal lab result, we will notify you by phone as soon as possible.  Submit refill requests through cafegive or call your pharmacy and they will forward the refill request to us. Please allow 3 business days for your refill to be completed.          Additional Information About Your Visit        Care EveryWhere ID     This is your Care EveryWhere ID. This could be used by other organizations to access your [place] medical records  [id number]        Your Vitals Were     Pulse Temperature Respirations Pulse Oximetry BMI (Body Mass Index)       74 98.1  F (36.7  C) (Oral) 12 100% 27.07 kg/m2        Blood Pressure from Last 3 Encounters:   09/07/18 124/84   08/03/18 96/83   07/23/18 (!)  127/112    Weight from Last 3 Encounters:   09/07/18 157 lb 11.2 oz (71.5 kg)   08/03/18 150 lb 8 oz (68.3 kg)   07/23/18 168 lb (76.2 kg)              We Performed the Following     BNP-N terminal pro     Comprehensive metabolic panel     Lipid panel reflex to direct LDL Fasting     T4, free     TSH        Primary Care Provider Office Phone # Fax #    [facility] [phone] [address] [address]        Equal Access to Services     CECILE PARSON : Hadii aad ku hadasho Soomaali, waaxda luqadaha, qaybta kaalmada adeegyada, waxay idiin hayaan adelopez reyes . So [facility] [place] [phone].    ATENCIÓN: Si habla español, tiene a luis disposición servicios gratuitos de asistencia lingüística. Llame al [phone].    We comply with applicable federal civil rights laws and [place] laws. We do not discriminate on the basis of race, color, national origin, age, disability, sex, sexual orientation, or gender identity.            Thank you!     Thank you for choosing [facility]  for your care. Our goal is always to provide you with excellent care. Hearing back from our patients is one way we can continue to improve our services. Please take a few minutes to complete the written survey that you may receive in the mail after your visit with us. Thank you!             Your Updated Medication List - Protect others around you: Learn how to safely use, store and throw away your medicines at www.disposemymeds.org.          This list is accurate as of 9/7/18 11:23 AM.  Always use your most recent med list.                   Brand Name Dispense Instructions for use Diagnosis    albuterol 108 (90 Base) MCG/ACT inhaler    PROAIR HFA/PROVENTIL HFA/VENTOLIN HFA    3 Inhaler    Inhale 2 puffs into the lungs every 6 hours as needed for shortness of breath / dyspnea or wheezing    Cough       bismuth subsalicylate 262 MG/15ML suspension    PEPTO BISMOL     Take 15  mLs by mouth every 6 hours as needed for indigestion        flecainide acetate 150 MG Tabs     180 tablet    Take 1 tablet (150 mg) by mouth every 12 hours    Atrial fibrillation with RVR (H)       ranitidine 150 MG tablet    ZANTAC    30 tablet    Take 1 tablet (150 mg) by mouth daily    Epigastric pain       rivaroxaban ANTICOAGULANT 15 MG Tabs tablet    XARELTO    30 tablet    Take 1 tablet (15 mg) by mouth daily (with dinner)    Atrial fibrillation with RVR (H)

## 2018-10-26 ENCOUNTER — TELEPHONE (OUTPATIENT)
Dept: CARDIOLOGY | Facility: CLINIC | Age: 58
End: 2018-10-26

## 2018-10-26 DIAGNOSIS — I48.91 ATRIAL FIBRILLATION WITH RVR (H): ICD-10-CM

## 2018-10-26 RX ORDER — FLECAINIDE ACETATE 150 MG/1
150 TABLET ORAL EVERY 12 HOURS
Qty: 60 TABLET | Refills: 0 | Status: SHIPPED | OUTPATIENT
Start: 2018-10-26 | End: 2018-11-30

## 2018-10-26 NOTE — TELEPHONE ENCOUNTER
Spoke to patient regarding upcoming appt scheduled for 11/5.  Patient to see [facility] in the morning with labs prior to appt and Dr Dixon in the afternoon.  After review of chart patient is newly diagnosed with afib w/RVR and was placed on xarelto and flecainide. Patient scheduled to see Dr Dixon who specializes in PHTN.  Reviewed with team 2 and asked for this to be reviewed.   After review will rescheduled appt from Dr Dixon to Dr Quezada as patient needed follow up after initiation of new medication.  When speaking to patient about appt reviewed past history with her as it relates to no show and cancelling appt.   Patient reports she does not drive and relies on other people to get her to her appt.  She currently has a ride set up for 11/5.  Patient also needing refills for her flecainide and xarelto.  Patient asking if she needs to continue these medications.  Informed patient that this would be reviewed at her appt the need for continuation of medication.    Dr Quezada did give ok to refill medication until seen.  Due to financial issues will provided samples of xarelto (14 days)  for patient until seen by Dr Quezada.  Reviewed with patient rationale why there was a change in provider on day of appt and rationale why.  Patient requesting that refill be sent to [facility] in [place].  30 day supply sent as requested.  Patient provided verbal understanding of above and appt reminder sent to patient.  JOAQUINA Keith

## 2018-11-05 ENCOUNTER — OFFICE VISIT (OUTPATIENT)
Dept: CARDIOLOGY | Facility: CLINIC | Age: 58
End: 2018-11-05

## 2018-11-05 ENCOUNTER — OFFICE VISIT (OUTPATIENT)
Dept: CARDIOLOGY | Facility: CLINIC | Age: 58
End: 2018-11-05
Attending: INTERNAL MEDICINE

## 2018-11-05 VITALS
SYSTOLIC BLOOD PRESSURE: 104 MMHG | BODY MASS INDEX: 28 KG/M2 | HEIGHT: 64 IN | HEART RATE: 84 BPM | OXYGEN SATURATION: 99 % | WEIGHT: 164 LBS | DIASTOLIC BLOOD PRESSURE: 64 MMHG

## 2018-11-05 DIAGNOSIS — I50.23 ACUTE ON CHRONIC SYSTOLIC CONGESTIVE HEART FAILURE (H): Primary | ICD-10-CM

## 2018-11-05 DIAGNOSIS — I48.19 PERSISTENT ATRIAL FIBRILLATION (H): Primary | ICD-10-CM

## 2018-11-05 DIAGNOSIS — I50.9 CHF (CONGESTIVE HEART FAILURE) (H): ICD-10-CM

## 2018-11-05 LAB
ANION GAP SERPL CALCULATED.3IONS-SCNC: 12.3 MMOL/L (ref 6–17)
BUN SERPL-MCNC: 16 MG/DL (ref 7–30)
CALCIUM SERPL-MCNC: 10 MG/DL (ref 8.5–10.5)
CHLORIDE SERPL-SCNC: 103 MMOL/L (ref 98–107)
CO2 SERPL-SCNC: 28 MMOL/L (ref 23–29)
CREAT SERPL-MCNC: 1.07 MG/DL (ref 0.7–1.3)
GFR SERPL CREATININE-BSD FRML MDRD: 53 ML/MIN/1.7M2
GLUCOSE SERPL-MCNC: 190 MG/DL (ref 70–105)
NT-PROBNP SERPL-MCNC: 225 PG/ML (ref 0–125)
POTASSIUM SERPL-SCNC: 4.3 MMOL/L (ref 3.5–5.1)
SODIUM SERPL-SCNC: 139 MMOL/L (ref 136–145)

## 2018-11-05 PROCEDURE — 93000 ELECTROCARDIOGRAM COMPLETE: CPT | Performed by: INTERNAL MEDICINE

## 2018-11-05 PROCEDURE — 99214 OFFICE O/P EST MOD 30 MIN: CPT | Performed by: PHYSICIAN ASSISTANT

## 2018-11-05 PROCEDURE — 99213 OFFICE O/P EST LOW 20 MIN: CPT | Mod: 25 | Performed by: INTERNAL MEDICINE

## 2018-11-05 PROCEDURE — 83880 ASSAY OF NATRIURETIC PEPTIDE: CPT | Performed by: INTERNAL MEDICINE

## 2018-11-05 PROCEDURE — 36415 COLL VENOUS BLD VENIPUNCTURE: CPT | Performed by: INTERNAL MEDICINE

## 2018-11-05 PROCEDURE — 80048 BASIC METABOLIC PNL TOTAL CA: CPT | Performed by: INTERNAL MEDICINE

## 2018-11-05 RX ORDER — FUROSEMIDE 20 MG
20 TABLET ORAL DAILY
Qty: 30 TABLET | Refills: 11 | Status: SHIPPED | OUTPATIENT
Start: 2018-11-05 | End: 2018-11-09

## 2018-11-05 RX ORDER — METOPROLOL SUCCINATE 25 MG/1
25 TABLET, EXTENDED RELEASE ORAL DAILY
Qty: 30 TABLET | Refills: 11 | Status: SHIPPED | OUTPATIENT
Start: 2018-11-05 | End: 2018-11-30

## 2018-11-05 NOTE — PROGRESS NOTES
Service Date: 2018      HISTORY OF PRESENT ILLNESS:  I saw Ms. Sheridan for followup of atrial fibrillation.  She is a 58-year-old lady from the [facility].  She was admitted for atrial fibrillation with rapid ventricular rate in 2018.  She had recurrent atrial fibrillation after DC cardioversion.  She was subsequently treated with flecainide, and has remained in sinus rhythm.  Initial echocardiography reported ejection fraction about 50%.  The RANDY a few days later reported ejection fraction 30%.  The patient has been to [facility] for regular followup.  Symptomatically, she has no complaints.      PHYSICAL EXAMINATION:   VITAL SIGNS:  Blood pressure was 104/64, heart rate 84 beats per minute, body weight 164 pounds.   HEENT:  Eyes and ENT were unremarkable.   LUNGS:  Clear.   CARDIAC:  Rhythm was regular, and the heart sounds were normal with no murmur.   ABDOMEN:  Examination showed no hepatomegaly.   EXTREMITIES:  There was no pedal edema.      EKG showed normal sinus rhythm.      ASSESSMENT AND RECOMMENDATIONS:  Ms. Sheridan is doing well symptomatically.  She has remained in sinus rhythm on flecainide.  She is waiting for repeat echocardiography this Wednesday.  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  If her EF is back to normal, she can stop Xarelto for anticoagulation.      She was previously labeled to have hyperthyroidism.  She is off any anti-hyperthyroid medicine at the present time, and she has no other clinical evidence of hyperthyroidism.      If she does well, I will see her for followup in 6 months.         MANJINDER HAN MD             D: 2018   T: 2018   MT: COURTNEY      Name:     ALEXANDRO SHERIDAN   MRN:      [id number]        Account:      [id number]   :      1960           Service Date: 2018      Document: [id number]

## 2018-11-05 NOTE — PROGRESS NOTES
[id number]  HPI and Plan:   See dictation    Orders this Visit:  Orders Placed This Encounter   Procedures     N terminal pro BNP outpatient     TSH with free T4 reflex     Echocardiogram     Orders Placed This Encounter   Medications     furosemide (LASIX) 20 MG tablet     Sig: Take 1 tablet (20 mg) by mouth daily     Dispense:  30 tablet     Refill:  11     metoprolol succinate (TOPROL XL) 25 MG 24 hr tablet     Sig: Take 1 tablet (25 mg) by mouth daily     Dispense:  30 tablet     Refill:  11     There are no discontinued medications.      Encounter Diagnoses   Name Primary?     CHF (congestive heart failure) (H)      Acute on chronic systolic congestive heart failure (H) Yes       CURRENT MEDICATIONS:  Current Outpatient Prescriptions   Medication Sig Dispense Refill     albuterol (PROAIR HFA/PROVENTIL HFA/VENTOLIN HFA) 108 (90 Base) MCG/ACT Inhaler Inhale 2 puffs into the lungs every 6 hours as needed for shortness of breath / dyspnea or wheezing 3 Inhaler 1     bismuth subsalicylate (PEPTO BISMOL) 262 MG/15ML suspension Take 15 mLs by mouth every 6 hours as needed for indigestion       flecainide acetate 150 MG TABS Take 1 tablet (150 mg) by mouth every 12 hours 60 tablet 0     furosemide (LASIX) 20 MG tablet Take 1 tablet (20 mg) by mouth daily 30 tablet 11     metoprolol succinate (TOPROL XL) 25 MG 24 hr tablet Take 1 tablet (25 mg) by mouth daily 30 tablet 11     ranitidine (ZANTAC) 150 MG tablet Take 1 tablet (150 mg) by mouth daily 30 tablet 2     rivaroxaban ANTICOAGULANT (XARELTO) 15 MG TABS tablet Take 1 tablet (15 mg) by mouth daily (with dinner) 30 tablet        ALLERGIES   No Known Allergies    PAST MEDICAL HISTORY:  Past Medical History:   Diagnosis Date     Hyperthyroidism      Persistent atrial fibrillation (H)        PAST SURGICAL HISTORY:  Past Surgical History:   Procedure Laterality Date     CHOLECYSTECTOMY         FAMILY HISTORY:  Family History   Problem Relation Age of Onset     Lung Cancer  "Mother      Lung Cancer Father      Cerebrovascular Disease Brother      Family History Negative Brother      Family History Negative Brother        SOCIAL HISTORY:  Social History     Social History     Marital status:      Spouse name: N/A     Number of children: N/A     Years of education: N/A     Social History Main Topics     Smoking status: Never Smoker     Smokeless tobacco: Never Used     Alcohol use No     Drug use: No     Sexual activity: Yes     Partners: Male     Other Topics Concern     Parent/Sibling W/ Cabg, Mi Or Angioplasty Before 65f 55m? No     Social History Narrative       Review of Systems:  Skin:  Negative     Eyes:  Negative    ENT:  Negative    Respiratory:  Negative    Cardiovascular:  Negative    Gastroenterology: Negative    Genitourinary:  Negative    Musculoskeletal:  Negative    Neurologic:  Negative    Psychiatric:  Negative    Heme/Lymph/Imm:  Negative    Endocrine:  Negative      Physical Exam:  Vitals: /64  Pulse 84  Ht 1.626 m (5' 4\")  Wt 74.4 kg (164 lb)  SpO2 99%  BMI 28.15 kg/m2   Please refer to dictation for physical exam    Recent Lab Results:  LIPID RESULTS:  Lab Results   Component Value Date    CHOL 223 (H) 09/07/2018    HDL 52 09/07/2018     (H) 09/07/2018    TRIG 303 (H) 09/07/2018       LIVER ENZYME RESULTS:  Lab Results   Component Value Date    AST 43 09/07/2018    ALT 60 (H) 09/07/2018       CBC RESULTS:  Lab Results   Component Value Date    WBC 7.3 07/27/2018    RBC 4.63 07/27/2018    HGB 13.9 07/27/2018    HCT 42.9 07/27/2018    MCV 93 07/27/2018    MCH 30.0 07/27/2018    MCHC 32.4 07/27/2018    RDW 14.5 07/27/2018     07/27/2018       BMP RESULTS:  Lab Results   Component Value Date     11/05/2018    POTASSIUM 4.3 11/05/2018    CHLORIDE 103 11/05/2018    CO2 28 11/05/2018    ANIONGAP 12.3 11/05/2018     (H) 11/05/2018    BUN 16 11/05/2018    CR 1.07 11/05/2018    GFRESTIMATED 53 (L) 11/05/2018    GFRESTBLACK 64 " 11/05/2018    ANKUSH 10.0 11/05/2018        A1C RESULTS:  No results found for: A1C    INR RESULTS:  Lab Results   Component Value Date    INR 3.51 (H) 08/02/2018    INR 1.61 (H) 07/31/2018           CC  Arlet Dixon MD  [address] [place] [id number]

## 2018-11-05 NOTE — MR AVS SNAPSHOT
After Visit Summary   11/5/2018    Luz Elena Caballero    MRN: [id number]           Patient Information     Date Of Birth          1960        Visit Information        Provider Department      11/5/2018 10:50 AM More, Jillian Bray PA-C [facility]   [place]        Today's Diagnoses     Acute on chronic systolic congestive heart failure (H)    -  1    CHF (congestive heart failure) (H)          Care Instructions    Call [facility] nurse for any questions or concerns:  [phone]   *If you have concerns after hours, please call [phone], option 2 to speak with on call Cardiologist.    Schedule an echocardiogram to look at your heart strength    We'll determine your follow up based on what your echocardiogram shows.       1. Medication changes from today:  Start taking Toprol XL/ metoprolol succinate 25 mg once a day.    Start taking Lasix/ furosemide 20 mg once a day.       2. Weigh yourself daily and write it down.     3. Call CORE nurse if your weight is up more than 2 pounds in one day or 5 pounds in one week.     4. Call CORE nurse if you feel more short of breath, have more abdominal bloating, or leg swelling.     5. Continue low sodium diet (less than 2000 mg daily). If you eat less salt, you will retain less fluid.     6. Alcohol can weaken your heart further. You should avoid alcohol or limit its use to special times, such as a holiday or birthday.      7. Do NOT take Aleve or ibuprofen without talking to your doctor first.      8. Lab Results: labs look good.       Component      Latest Ref Rng & Units 9/7/2018 11/5/2018   Sodium      136 - 145 mmol/L 139 139   Potassium      3.5 - 5.1 mmol/L 4.1 4.3   Chloride      98 - 107 mmol/L 102 103   Carbon Dioxide      23 - 29 mmol/L 30 28   Anion Gap      6 - 17 mmol/L 7 12.3   Glucose      70 - 105 mg/dL 102 (H) 190 (H)   Urea Nitrogen      7 - 30 mg/dL 15 16   Creatinine      0.70 - 1.30 mg/dL 0.83 1.07   GFR  Estimate      >60 mL/min/1.7m2 71 53 (L)   GFR Estimate If Black      >60 mL/min/1.7m2 86 64   Calcium      8.5 - 10.5 mg/dL 9.3 10.0   Bilirubin Total      0.2 - 1.3 mg/dL 0.6    Albumin      3.4 - 5.0 g/dL 3.8    Protein Total      6.8 - 8.8 g/dL 8.2    Alkaline Phosphatase      40 - 150 U/L 140    ALT      0 - 50 U/L 60 (H)    AST      0 - 45 U/L 43      [facility]: Cardiomyopathy, Optimization, Rehabilitation, Education  The [facility] is a heart failure specialty clinic within the [facility] [place] where you will work with specialized nurse practitioners, physician assistants, doctors, and registered nurses. They are dedicated to helping patients with heart failure to carefully adjust medications, receive education, and learn who and when to call if symptoms develop. They specialize in helping you better understand your condition, slow the progression of your disease, improve the length and quality of your life, help you detect future heart problems before they become life threatening, and avoid hospitalizations.              Follow-ups after your visit        Your next 10 appointments already scheduled     Nov 05, 2018  1:15 PM CST   [facility] EP RETURN with Quinn Quezada MD   [facility]   [place] ([facility])    [address] [place] [id number] [phone] OPT 2              Future tests that were ordered for you today     Open Future Orders        Priority Expected Expires Ordered    N terminal pro BNP outpatient Routine 11/5/2018 11/5/2019 11/5/2018    TSH with free T4 reflex Routine 11/5/2018 11/5/2019 11/5/2018    Echocardiogram Routine 11/12/2018 11/5/2019 11/5/2018            Who to contact     If you have questions or need follow up information about today's clinic visit or your schedule please contact [facility] directly at [phone].  Normal or non-critical lab and imaging results will be communicated  "to you by MyChart, letter or phone within 4 business days after the clinic has received the results. If you do not hear from us within 7 days, please contact the clinic through MyChart or phone. If you have a critical or abnormal lab result, we will notify you by phone as soon as possible.  Submit refill requests through [employer] or call your pharmacy and they will forward the refill request to us. Please allow 3 business days for your refill to be completed.          Additional Information About Your Visit        Care EveryWhere ID     This is your Care EveryWhere ID. This could be used by other organizations to access your [facility] medical records  [id number]        Your Vitals Were     Pulse Height Pulse Oximetry BMI (Body Mass Index)          84 1.626 m (5' 4\") 99% 28.15 kg/m2         Blood Pressure from Last 3 Encounters:   11/05/18 104/64   09/07/18 124/84   08/03/18 96/83    Weight from Last 3 Encounters:   11/05/18 74.4 kg (164 lb)   09/07/18 71.5 kg (157 lb 11.2 oz)   08/03/18 68.3 kg (150 lb 8 oz)              We Performed the Following     Follow-Up with [facility]          Today's Medication Changes          These changes are accurate as of 11/5/18 11:46 AM.  If you have any questions, ask your nurse or doctor.               Start taking these medicines.        Dose/Directions    furosemide 20 MG tablet   Commonly known as:  LASIX   Used for:  Acute on chronic systolic congestive heart failure (H)   Started by:  Jillian Solomon PA-C        Dose:  20 mg   Take 1 tablet (20 mg) by mouth daily   Quantity:  30 tablet   Refills:  11       metoprolol succinate 25 MG 24 hr tablet   Commonly known as:  TOPROL XL   Used for:  Acute on chronic systolic congestive heart failure (H)   Started by:  Jillian Solomon PA-C        Dose:  25 mg   Take 1 tablet (25 mg) by mouth daily   Quantity:  30 tablet   Refills:  11            Where to get your medicines      [facility] were sent to [id number] - [place], [place] - [address]  [address], [facility] [address]     Phone:  [phone]     furosemide 20 MG tablet    metoprolol succinate 25 MG 24 hr tablet                Primary Care Provider Office Phone # Fax #    [facility] [phone] [address] [address]        Equal Access to Services     CECILE PARSON : Hadii aad ku hadasho Soomaali, waaxda luqadaha, qaybta kaalmada adeegyada, waxay idiin hayaan adeeg kharash la'danin ah. So [facility] [phone].    ATENCIÓN: Si habla español, tiene a luis disposición servicios gratuitos de asistencia lingüística. Llame al [phone].    We comply with applicable federal civil rights laws and [place] laws. We do not discriminate on the basis of race, color, national origin, age, disability, sex, sexual orientation, or gender identity.            Thank you!     Thank you for choosing [facility]  for your care. Our goal is always to provide you with excellent care. Hearing back from our patients is one way we can continue to improve our services. Please take a few minutes to complete the written survey that you may receive in the mail after your visit with us. Thank you!             Your Updated Medication List - Protect others around you: Learn how to safely use, store and throw away your medicines at www.disposemymeds.org.          This list is accurate as of 11/5/18 11:46 AM.  Always use your most recent med list.                   Brand Name Dispense Instructions for use Diagnosis    albuterol 108 (90 Base) MCG/ACT inhaler    PROAIR HFA/PROVENTIL HFA/VENTOLIN HFA    3 Inhaler    Inhale 2 puffs into the lungs every 6 hours as needed for shortness of breath / dyspnea or wheezing    Cough       bismuth subsalicylate 262 MG/15ML suspension    PEPTO BISMOL     Take 15 mLs by mouth every 6 hours as needed for indigestion        flecainide acetate 150 MG Tabs      60 tablet    Take 1 tablet (150 mg) by mouth every 12 hours    Atrial fibrillation with RVR (H)       furosemide 20 MG tablet    LASIX    30 tablet    Take 1 tablet (20 mg) by mouth daily    Acute on chronic systolic congestive heart failure (H)       metoprolol succinate 25 MG 24 hr tablet    TOPROL XL    30 tablet    Take 1 tablet (25 mg) by mouth daily    Acute on chronic systolic congestive heart failure (H)       ranitidine 150 MG tablet    ZANTAC    30 tablet    Take 1 tablet (150 mg) by mouth daily    Epigastric pain       rivaroxaban ANTICOAGULANT 15 MG Tabs tablet    XARELTO    30 tablet    Take 1 tablet (15 mg) by mouth daily (with dinner)    Atrial fibrillation with RVR (H)

## 2018-11-05 NOTE — LETTER
11/5/2018    [address] [address]    RE: Luz Elena Caballero       Dear Colleague,    I had the pleasure of seeing Luz Elena Caballero in the [facility].    HPI and Plan:   See dictation    Orders Placed This Encounter   Procedures     Follow-Up with Electrophysiologist     EKG 12-lead complete w/read - Clinics (performed today)     EKG 12-lead complete w/read (Future)- to be scheduled       No orders of the defined types were placed in this encounter.      There are no discontinued medications.      Encounter Diagnoses   Name Primary?     Atrial fibrillation (H) Yes     Persistent atrial fibrillation (H)        CURRENT MEDICATIONS:  Current Outpatient Prescriptions   Medication Sig Dispense Refill     albuterol (PROAIR HFA/PROVENTIL HFA/VENTOLIN HFA) 108 (90 Base) MCG/ACT Inhaler Inhale 2 puffs into the lungs every 6 hours as needed for shortness of breath / dyspnea or wheezing 3 Inhaler 1     bismuth subsalicylate (PEPTO BISMOL) 262 MG/15ML suspension Take 15 mLs by mouth every 6 hours as needed for indigestion       flecainide acetate 150 MG TABS Take 1 tablet (150 mg) by mouth every 12 hours 60 tablet 0     furosemide (LASIX) 20 MG tablet Take 1 tablet (20 mg) by mouth daily 30 tablet 11     metoprolol succinate (TOPROL XL) 25 MG 24 hr tablet Take 1 tablet (25 mg) by mouth daily 30 tablet 11     ranitidine (ZANTAC) 150 MG tablet Take 1 tablet (150 mg) by mouth daily 30 tablet 2     rivaroxaban ANTICOAGULANT (XARELTO) 15 MG TABS tablet Take 1 tablet (15 mg) by mouth daily (with dinner) 30 tablet        ALLERGIES   No Known Allergies    PAST MEDICAL HISTORY:  Past Medical History:   Diagnosis Date     Hyperthyroidism      Persistent atrial fibrillation (H)        PAST SURGICAL HISTORY:  Past Surgical History:   Procedure Laterality Date     CHOLECYSTECTOMY         FAMILY HISTORY:  Family History   Problem Relation Age of Onset     Lung  Cancer Mother      Lung Cancer Father      Cerebrovascular Disease Brother      Family History Negative Brother      Family History Negative Brother        SOCIAL HISTORY:  Social History     Social History     Marital status:      Spouse name: N/A     Number of children: N/A     Years of education: N/A     Social History Main Topics     Smoking status: Never Smoker     Smokeless tobacco: Never Used     Alcohol use No     Drug use: No     Sexual activity: Yes     Partners: Male     Other Topics Concern     Parent/Sibling W/ Cabg, Mi Or Angioplasty Before 65f 55m? No     Social History Narrative       Review of Systems:  Skin:  Negative       Eyes:  Negative      ENT:  Negative      Respiratory:  Negative       Cardiovascular:  Negative      Gastroenterology: Negative      Genitourinary:  Negative      Musculoskeletal:  Negative      Neurologic:  Negative      Psychiatric:  Negative      Heme/Lymph/Imm:  Negative      Endocrine:  Negative        Physical Exam:  Vitals: There were no vitals taken for this visit.    Constitutional:  cooperative, alert and oriented, well developed, well nourished, in no acute distress        Skin:  warm and dry to the touch, no apparent skin lesions or masses noted          Head:  normocephalic, no masses or lesions        Eyes:  pupils equal and round, conjunctivae and lids unremarkable, sclera white, no xanthalasma, EOMS intact, no nystagmus        Lymph:No Cervical lymphadenopathy present     ENT:  no pallor or cyanosis, dentition good        Neck:  carotid pulses are full and equal bilaterally, JVP normal, no carotid bruit        Respiratory:  normal breath sounds, clear to auscultation, normal A-P diameter, normal symmetry, normal respiratory excursion, no use of accessory muscles         Cardiac: regular rhythm, normal S1/S2, no S3 or S4, apical impulse not displaced, no murmurs, gallops or rubs                                                         GI:  abdomen soft,  non-tender, BS normoactive, no mass, no HSM, no bruits        Extremities and Muscular Skeletal:  no deformities, clubbing, cyanosis, erythema observed              Neurological:  no gross motor deficits        Psych:  Alert and Oriented x 3        CC  No referring provider defined for this encounter.                Service Date: 11/05/2018      HISTORY OF PRESENT ILLNESS:  I saw Ms. Caballero for followup of atrial fibrillation.  She is a 58-year-old lady from the [facility].  She was admitted for atrial fibrillation with rapid ventricular rate in 08/2018.  She had recurrent atrial fibrillation after DC cardioversion.  She was subsequently treated with flecainide, and has remained in sinus rhythm.  Initial echocardiography reported ejection fraction about 50%.  The [facility]Y a few days later reported ejection fraction 30%.  The patient has [facility] for [facility] followup.  Symptomatically, she has no complaints.      PHYSICAL EXAMINATION:   VITAL SIGNS:  Blood pressure was 104/64, heart rate 84 beats per minute, body weight 164 pounds.   HEENT:  Eyes and ENT were unremarkable.   LUNGS:  Clear.   CARDIAC:  Rhythm was regular, and the heart sounds were normal with no murmur.   ABDOMEN:  Examination showed no hepatomegaly.   EXTREMITIES:  There was no pedal edema.      EKG showed normal sinus rhythm.      ASSESSMENT AND RECOMMENDATIONS:  Ms. Caballero is doing well symptomatically.  She has remained in sinus rhythm on flecainide.  She is waiting for repeat echocardiography this Wednesday.  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  If her EF is back to normal, she can stop Xarelto for anticoagulation.      She was previously labeled to have hyperthyroidism.  She is off any anti-hyperthyroid medicine at the present time, and she has no other clinical evidence of hyperthyroidism.      If she does well, I will see her for followup in 6 months.         MANJINDER HAN MD             D:  2018   T: 2018   MT: COURTNEY      Name:     ALEXANDRO SHERIDAN   MRN:      [id number]        Account:      [id number]   :      1960           Service Date: 2018      Document: [id number]        Thank you for allowing me to participate in the care of your patient.      Sincerely,     Quinn Quezada MD     [facility]    cc:   No referring provider defined for this encounter.

## 2018-11-05 NOTE — MR AVS SNAPSHOT
After Visit Summary   11/5/2018    Luz Elena Caballero    MRN: [id number]           Patient Information     Date Of Birth          1960        Visit Information        Provider Department      11/5/2018 1:15 PM Quinn Quezada MD [facility]        Today's Diagnoses     Persistent atrial fibrillation (H)    -  1       Follow-ups after your visit        Additional Services     Follow-Up with Electrophysiologist                 Your next 10 appointments already scheduled     Nov 07, 2018  3:00 PM CST   Ech Complete with [id number]   [facility] CV Echocardiography (Cardiovascular Imaging at [facility])    [address] [id number] [phone]           1.  Please bring or wear a comfortable two-piece outfit. 2.  You may eat, drink and take your normal medicines. 3.  For any questions that cannot be answered, please contact the ordering physician 4.  Please do not wear perfumes or scented lotions on the day of your exam.              Future tests that were ordered for you today     Open Future Orders        Priority Expected Expires Ordered    EKG 12-lead complete w/read (Future)- to be scheduled Routine 5/5/2019 11/5/2019 11/5/2018    Follow-Up with Electrophysiologist Routine 5/4/2019 11/5/2019 11/5/2018    N terminal pro BNP outpatient Routine 11/5/2018 11/5/2019 11/5/2018    TSH with free T4 reflex Routine 11/5/2018 11/5/2019 11/5/2018    Echocardiogram Routine 11/12/2018 11/5/2019 11/5/2018            Who to contact     If you have questions or need follow up information about today's clinic visit or your schedule please contact [facility] directly at [phone].  Normal or non-critical lab and imaging results will be communicated to you by MyChart, letter or phone within 4 business days after the clinic has received the results. If you do not hear from us within 7  days, please contact the clinic through [employer] or phone. If you have a critical or abnormal lab result, we will notify you by phone as soon as possible.  Submit refill requests through [employer] or call your pharmacy and they will forward the refill request to us. Please allow 3 business days for your refill to be completed.          Additional Information About Your Visit        Care EveryWhere ID     This is your Care EveryWhere ID. This could be used by other organizations to access your [facility] medical records  [id number]         Blood Pressure from Last 3 Encounters:   11/05/18 104/64   09/07/18 124/84   08/03/18 96/83    Weight from Last 3 Encounters:   11/05/18 74.4 kg (164 lb)   09/07/18 71.5 kg (157 lb 11.2 oz)   08/03/18 68.3 kg (150 lb 8 oz)              We Performed the Following     EKG 12-lead complete w/read - Clinics (performed today)          Today's Medication Changes          These changes are accurate as of 11/5/18  1:28 PM.  If you have any questions, ask your nurse or doctor.               Start taking these medicines.        Dose/Directions    furosemide 20 MG tablet   Commonly known as:  LASIX   Used for:  Acute on chronic systolic congestive heart failure (H)   Started by:  Jillian Solomon PA-C        Dose:  20 mg   Take 1 tablet (20 mg) by mouth daily   Quantity:  30 tablet   Refills:  11       metoprolol succinate 25 MG 24 hr tablet   Commonly known as:  TOPROL XL   Used for:  Acute on chronic systolic congestive heart failure (H)   Started by:  Jillian Solomon PA-C        Dose:  25 mg   Take 1 tablet (25 mg) by mouth daily   Quantity:  30 tablet   Refills:  11            Where to get your medicines      These medications were sent to [facility] # [id number] - [place], [place] - [address]  [address] [address], [facility] [address]     Phone:  [phone]     furosemide 20 MG tablet    metoprolol succinate 25 MG 24 hr tablet                Primary Care Provider Office  Phone # Fax #    Martha [facility] [phone] [address] [address]        Equal Access to Services     CECILE APRSON : Hadii mercedes ku hadjorge ao Sojovonali, waaxda luqadaha, qaybta kaalmada adehebert, geena joy laZuhairarmando fuentes. So [facility] [phone].    ATENCIÓN: Si habla español, tiene a luis disposición servicios gratuitos de asistencia lingüística. Llame al [phone].    We comply with applicable federal civil rights laws and [place] laws. We do not discriminate on the basis of race, color, national origin, age, disability, sex, sexual orientation, or gender identity.            Thank you!     Thank you for choosing [facility]  for your care. Our goal is always to provide you with excellent care. Hearing back from our patients is one way we can continue to improve our services. Please take a few minutes to complete the written survey that you may receive in the mail after your visit with us. Thank you!             Your Updated Medication List - Protect others around you: Learn how to safely use, store and throw away your medicines at www.disposemymeds.org.          This list is accurate as of 11/5/18  1:28 PM.  Always use your most recent med list.                   Brand Name Dispense Instructions for use Diagnosis    albuterol 108 (90 Base) MCG/ACT inhaler    PROAIR HFA/PROVENTIL HFA/VENTOLIN HFA    3 Inhaler    Inhale 2 puffs into the lungs every 6 hours as needed for shortness of breath / dyspnea or wheezing    Cough       bismuth subsalicylate 262 MG/15ML suspension    PEPTO BISMOL     Take 15 mLs by mouth every 6 hours as needed for indigestion        flecainide acetate 150 MG Tabs     60 tablet    Take 1 tablet (150 mg) by mouth every 12 hours    Atrial fibrillation with RVR (H)       furosemide 20 MG tablet    LASIX    30 tablet    Take 1 tablet (20 mg) by mouth daily    Acute on chronic systolic congestive  heart failure (H)       metoprolol succinate 25 MG 24 hr tablet    TOPROL XL    30 tablet    Take 1 tablet (25 mg) by mouth daily    Acute on chronic systolic congestive heart failure (H)       ranitidine 150 MG tablet    ZANTAC    30 tablet    Take 1 tablet (150 mg) by mouth daily    Epigastric pain       rivaroxaban ANTICOAGULANT 15 MG Tabs tablet    XARELTO    30 tablet    Take 1 tablet (15 mg) by mouth daily (with dinner)    Atrial fibrillation with RVR (H)

## 2018-11-05 NOTE — LETTER
11/5/2018      [address] [address]      RE: Alexandro Sheridan       Dear Colleague,    I had the pleasure of seeing Alexandro Sheridan in the [facility].    Service Date: 11/05/2018      HISTORY OF PRESENT ILLNESS:  I saw Ms. Sheridan for followup of atrial fibrillation.  She is a 58-year-old lady from the [facility].  She was admitted for atrial fibrillation with rapid ventricular rate in 08/2018.  She had recurrent atrial fibrillation after DC cardioversion.  She was subsequently treated with flecainide, and has remained in sinus rhythm.  Initial echocardiography reported ejection fraction about 50%.  The RANDY a few days later reported ejection fraction 30%.  The patient has been to the [facility] for regular followup.  Symptomatically, she has no complaints.      PHYSICAL EXAMINATION:   VITAL SIGNS:  Blood pressure was 104/64, heart rate 84 beats per minute, body weight 164 pounds.   HEENT:  Eyes and ENT were unremarkable.   LUNGS:  Clear.   CARDIAC:  Rhythm was regular, and the heart sounds were normal with no murmur.   ABDOMEN:  Examination showed no hepatomegaly.   EXTREMITIES:  There was no pedal edema.      EKG showed normal sinus rhythm.      ASSESSMENT AND RECOMMENDATIONS:  Ms. Sheridan is doing well symptomatically.  She has remained in sinus rhythm on flecainide.  She is waiting for repeat echocardiography this Wednesday.  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  If her EF is back to normal, she can stop Xarelto for anticoagulation.      She was previously labeled to have hyperthyroidism.  She is off any anti-hyperthyroid medicine at the present time, and she has no other clinical evidence of hyperthyroidism.      If she does well, I will see her for followup in 6 months.         MANJINDER HAN MD             D: 11/05/2018   T: 11/05/2018   MT: COURTNEY      Name:     ALEXANDRO SHERIDAN   MRN:       [id number]        Account:      [id number]   :      1960           Service Date: 2018      Document: [id number]      Outpatient Encounter Prescriptions as of 2018   Medication Sig Dispense Refill     albuterol (PROAIR HFA/PROVENTIL HFA/VENTOLIN HFA) 108 (90 Base) MCG/ACT Inhaler Inhale 2 puffs into the lungs every 6 hours as needed for shortness of breath / dyspnea or wheezing 3 Inhaler 1     bismuth subsalicylate (PEPTO BISMOL) 262 MG/15ML suspension Take 15 mLs by mouth every 6 hours as needed for indigestion       flecainide acetate 150 MG TABS Take 1 tablet (150 mg) by mouth every 12 hours 60 tablet 0     furosemide (LASIX) 20 MG tablet Take 1 tablet (20 mg) by mouth daily 30 tablet 11     metoprolol succinate (TOPROL XL) 25 MG 24 hr tablet Take 1 tablet (25 mg) by mouth daily 30 tablet 11     ranitidine (ZANTAC) 150 MG tablet Take 1 tablet (150 mg) by mouth daily 30 tablet 2     [DISCONTINUED] rivaroxaban ANTICOAGULANT (XARELTO) 15 MG TABS tablet Take 1 tablet (15 mg) by mouth daily (with dinner) 30 tablet      No facility-administered encounter medications on file as of 2018.      Again, thank you for allowing me to participate in the care of your patient.      Sincerely,    Quinn Quezada MD     [facility]

## 2018-11-05 NOTE — PATIENT INSTRUCTIONS
Call [facility] nurse for any questions or concerns:  [phone]   *If you have concerns after hours, please call [phone], option 2 to speak with on call Cardiologist.    Schedule an echocardiogram to look at your heart strength    We'll determine your follow up based on what your echocardiogram shows.       1. Medication changes from today:  Start taking Toprol XL/ metoprolol succinate 25 mg once a day.    Start taking Lasix/ furosemide 20 mg once a day.       2. Weigh yourself daily and write it down.     3. Call [facility] nurse if your weight is up more than 2 pounds in one day or 5 pounds in one week.     4. Call [facility] nurse if you feel more short of breath, have more abdominal bloating, or leg swelling.     5. Continue low sodium diet (less than 2000 mg daily). If you eat less salt, you will retain less fluid.     6. Alcohol can weaken your heart further. You should avoid alcohol or limit its use to special times, such as a holiday or birthday.      7. Do NOT take Aleve or ibuprofen without talking to your doctor first.      8. Lab Results: labs look good.       Component      Latest Ref Rng & Units 9/7/2018 11/5/2018   Sodium      136 - 145 mmol/L 139 139   Potassium      3.5 - 5.1 mmol/L 4.1 4.3   Chloride      98 - 107 mmol/L 102 103   Carbon Dioxide      23 - 29 mmol/L 30 28   Anion Gap      6 - 17 mmol/L 7 12.3   Glucose      70 - 105 mg/dL 102 (H) 190 (H)   Urea Nitrogen      7 - 30 mg/dL 15 16   Creatinine      0.70 - 1.30 mg/dL 0.83 1.07   GFR Estimate      >60 mL/min/1.7m2 71 53 (L)   GFR Estimate If Black      >60 mL/min/1.7m2 86 64   Calcium      8.5 - 10.5 mg/dL 9.3 10.0   Bilirubin Total      0.2 - 1.3 mg/dL 0.6    Albumin      3.4 - 5.0 g/dL 3.8    Protein Total      6.8 - 8.8 g/dL 8.2    Alkaline Phosphatase      40 - 150 U/L 140    ALT      0 - 50 U/L 60 (H)    AST      0 - 45 U/L 43      [facility]: Cardiomyopathy, Optimization, Rehabilitation, Education  The [facility] is a heart failure  specialty clinic within the [facility] where you will work with specialized nurse practitioners, physician assistants, doctors, and registered nurses. They are dedicated to helping patients with heart failure to carefully adjust medications, receive education, and learn who and when to call if symptoms develop. They specialize in helping you better understand your condition, slow the progression of your disease, improve the length and quality of your life, help you detect future heart problems before they become life threatening, and avoid hospitalizations.

## 2018-11-05 NOTE — LETTER
11/5/2018    [facility]  [address] [address]    RE: Luz Elena Caballero       Dear Colleague,    I had the pleasure of seeing Luz Elena Caballero in the [facility].    [id number]  HPI and Plan:   See dictation    Orders this Visit:  Orders Placed This Encounter   Procedures     N terminal pro BNP outpatient     TSH with free T4 reflex     Echocardiogram     Orders Placed This Encounter   Medications     furosemide (LASIX) 20 MG tablet     Sig: Take 1 tablet (20 mg) by mouth daily     Dispense:  30 tablet     Refill:  11     metoprolol succinate (TOPROL XL) 25 MG 24 hr tablet     Sig: Take 1 tablet (25 mg) by mouth daily     Dispense:  30 tablet     Refill:  11     There are no discontinued medications.      Encounter Diagnoses   Name Primary?     CHF (congestive heart failure) (H)      Acute on chronic systolic congestive heart failure (H) Yes       CURRENT MEDICATIONS:  Current Outpatient Prescriptions   Medication Sig Dispense Refill     albuterol (PROAIR HFA/PROVENTIL HFA/VENTOLIN HFA) 108 (90 Base) MCG/ACT Inhaler Inhale 2 puffs into the lungs every 6 hours as needed for shortness of breath / dyspnea or wheezing 3 Inhaler 1     bismuth subsalicylate (PEPTO BISMOL) 262 MG/15ML suspension Take 15 mLs by mouth every 6 hours as needed for indigestion       flecainide acetate 150 MG TABS Take 1 tablet (150 mg) by mouth every 12 hours 60 tablet 0     furosemide (LASIX) 20 MG tablet Take 1 tablet (20 mg) by mouth daily 30 tablet 11     metoprolol succinate (TOPROL XL) 25 MG 24 hr tablet Take 1 tablet (25 mg) by mouth daily 30 tablet 11     ranitidine (ZANTAC) 150 MG tablet Take 1 tablet (150 mg) by mouth daily 30 tablet 2     rivaroxaban ANTICOAGULANT (XARELTO) 15 MG TABS tablet Take 1 tablet (15 mg) by mouth daily (with dinner) 30 tablet        ALLERGIES   No Known Allergies    PAST MEDICAL HISTORY:  Past Medical History:   Diagnosis Date      "Hyperthyroidism      Persistent atrial fibrillation (H)        PAST SURGICAL HISTORY:  Past Surgical History:   Procedure Laterality Date     CHOLECYSTECTOMY         FAMILY HISTORY:  Family History   Problem Relation Age of Onset     Lung Cancer Mother      Lung Cancer Father      Cerebrovascular Disease Brother      Family History Negative Brother      Family History Negative Brother        SOCIAL HISTORY:  Social History     Social History     Marital status:      Spouse name: N/A     Number of children: N/A     Years of education: N/A     Social History Main Topics     Smoking status: Never Smoker     Smokeless tobacco: Never Used     Alcohol use No     Drug use: No     Sexual activity: Yes     Partners: Male     Other Topics Concern     Parent/Sibling W/ Cabg, Mi Or Angioplasty Before 65f 55m? No     Social History Narrative       Review of Systems:  Skin:  Negative     Eyes:  Negative    ENT:  Negative    Respiratory:  Negative    Cardiovascular:  Negative    Gastroenterology: Negative    Genitourinary:  Negative    Musculoskeletal:  Negative    Neurologic:  Negative    Psychiatric:  Negative    Heme/Lymph/Imm:  Negative    Endocrine:  Negative      Physical Exam:  Vitals: /64  Pulse 84  Ht 1.626 m (5' 4\")  Wt 74.4 kg (164 lb)  SpO2 99%  BMI 28.15 kg/m2   Please refer to dictation for physical exam    Recent Lab Results:  LIPID RESULTS:  Lab Results   Component Value Date    CHOL 223 (H) 09/07/2018    HDL 52 09/07/2018     (H) 09/07/2018    TRIG 303 (H) 09/07/2018       LIVER ENZYME RESULTS:  Lab Results   Component Value Date    AST 43 09/07/2018    ALT 60 (H) 09/07/2018       CBC RESULTS:  Lab Results   Component Value Date    WBC 7.3 07/27/2018    RBC 4.63 07/27/2018    HGB 13.9 07/27/2018    HCT 42.9 07/27/2018    MCV 93 07/27/2018    MCH 30.0 07/27/2018    MCHC 32.4 07/27/2018    RDW 14.5 07/27/2018     07/27/2018       BMP RESULTS:  Lab Results   Component Value Date    NA " 139 11/05/2018    POTASSIUM 4.3 11/05/2018    CHLORIDE 103 11/05/2018    CO2 28 11/05/2018    ANIONGAP 12.3 11/05/2018     (H) 11/05/2018    BUN 16 11/05/2018    CR 1.07 11/05/2018    GFRESTIMATED 53 (L) 11/05/2018    GFRESTBLACK 64 11/05/2018    ANKUSH 10.0 11/05/2018        A1C RESULTS:  No results found for: A1C    INR RESULTS:  Lab Results   Component Value Date    INR 3.51 (H) 08/02/2018    INR 1.61 (H) 07/31/2018           CC  Arlet Dixon MD  [address] [place] [address]        Thank you for allowing me to participate in the care of your patient.      Sincerely,     KSENIA LechugaC     [facility]    cc:   Arlet Dixon MD  [address] [place], [place] [address]

## 2018-11-05 NOTE — NURSING NOTE
The After Visit Summary was reviewed with the patient following their office visit with Ko. Patient was educated about any medication changes, the importance of following a low sodium diet, importance of recording daily weights, and when to call [facility] clinic. Patient verbalized understanding of the information and agrees to call with any questions or concerns.     Labs: Lab results from today were reviewed with the patient during the office visit. A copy of the results were provided on the After Visit Summary.     Return appointment: Future Appointments  Date Time Provider Department Center   11/5/2018 1:15 PM Quinn Quezada MD [facility]     Patient walked to scheduling for f/u ECHO appt    Medication changes: start lasix 20mg every day and Toprol XL 25mg qd      Michelle Marie, RN  [facility] RN Care Coordinator  [facility]  [phone]

## 2018-11-05 NOTE — LETTER
11/5/2018      [facility]  [address] [address]      RE: Luz Elena Caballero       Dear Colleague,    I had the pleasure of seeing Luz Elena Caballero in the [facility].    Service Date: 11/05/2018      PRIMARY CARDIOLOGIST:  Dr. Enrique for heart failure, Dr. Quezada for AFib.      HISTORY OF PRESENT ILLNESS:  Ms. Caballero is a 58-year-old who had not sought medical care for many years when she presented to the ER in July with shortness of breath.  She was found to have AFib with RVR and systolic heart failure.  Initial echocardiogram showed an EF of 45%, but a RANDY done for cardioversion showed an EF of around 30%-35%.  She was also found to have severe mitral and moderate to severe tricuspid regurgitation and hyperthyroidism.  She was started on methimazole.  She had recurrence of her AFib and then resolution and was started on flecainide by Dr. Quezada.  She was asked to follow up in [facility] early on but for some reason failed this.  She has been seen by primary.      She comes in today for followup of these events.  She has not seen Endocrine.  She is no longer on methimazole.  She did have a TSH done last month by primary care provider, and that was normal.  She says she feels back to herself.  She feels normal.  She can walk for 1 hour a day, but she thinks this is about a mile.  She walks slowly.  She denies orthopnea or PND.  She does not have swelling or shortness of breath.  That being said, she is up 10 pounds from the hospital.  She does feel puffy in her face and puffy in her abdomen.  She is not sure why she was not discharged on a diuretic, and nor can I find explanation in her paperwork.  She has been taking her Xarelto.  She also has been taking her flecainide.      SOCIAL HISTORY:  Unfortunately, she does not have any insurance.  Everything is paid for out of pocket.  She comes in today with her significant other, Bill.  Apparently she  grew up in the [facility], and they have been living together for about 9 years.  She is a lifelong nonsmoker.  No alcohol use.      PHYSICAL EXAMINATION:   GENERAL:  Exam reveals a well-developed, well-nourished woman in no acute distress.   HEENT:  Normocephalic, atraumatic.  Heart is regular in rate and rhythm, 2/6 systolic murmur heard best at the left sternal border.   LUNGS:  Clear, without wheezes, rales or rhonchi.  She has trace peripheral edema.   ABDOMEN:  Soft.  I do not appreciate JVP at 30 degrees, although it is difficult to assess secondary to body habitus.  I do not appreciate liver border.      LABORATORY DATA:  Labs today show creatinine 1.07, BUN 16, potassium 4.3, sodium 139.      ASSESSMENT AND PLAN:    1.  Acute systolic heart failure with EF initially somewhere between 30% and 45%, unclear as RANDY showed lower than transthoracic.  This is likely secondary to tachycardia, and she will need reassessment of her EF to determine next steps.  Unfortunately, there seemed to be a mix-up in her medications.  She was not discharged on a beta blocker or ACE nor a diuretic.  She does feel puffy today, and although I do not see any marked hypervolemia, if her weight is up, we will try a low dose of Lasix at 20 mg daily and see if this helps.  In addition, she should at least be on Toprol, and this was started today at 25 mg daily.  Ideally, we would have her on an ACE, and this is not started.  Given how far out we are from the hospital and how many things have fallen through the cracks with her, we are going to actually step back and do a repeat echocardiogram.  If her EF is normal or near normal, she would not need the addition of an ACE or spironolactone.  She could stay just on metoprolol.  If her EF is low, we will certainly need to do much further workup, optimization of meds and then consideration of an ischemic evaluation.  In addition, I will do a TSH today, as if she is still hyperthyroid, that  will be contributing to her cardiomyopathy.   2.  Atrial fibrillation, clinically in sinus rhythm today, on flecainide, but not beta blocker.  This was started today.  She is going to visit with Dr. Quezada this afternoon for further discussion of that.  Again, hyperthyroidism is an issue.  Her CHADS-VASc is at least 2 for female gender and heart failure, and she should remain on anticoagulation today which is 15 mg of Xarelto.   3.  Likely sleep apnea.  Without insurance coverage, will not pursue a sleep study at this time.   4.  Severe mitral and moderate to severe tricuspid regurgitation, possibly functional.  Will follow and get repeat echocardiogram.   5.  Social, ideally she would have insurance.  This should be pursued through open enrollment this year.  She is going to need to be on long-term medications down the road.      As she does not have insurance, we will determine next steps based on her echocardiogram.  We will get an echocardiogram.  If it has normalized, we will have her follow up in something like 6 months.  If it has not normalized, we will see her back in a week or 2 to start with med optimization.  I did discuss this plan with the patient and her , and the  is encouraging.      Thank you for allowing me to participate in this delightful patient's care.      WINSOME Mcguire PA-C             D: 2018   T: 2018   MT: COURTNEY      Name:     ALEXANDRO SHERIDAN   MRN:      [id number]        Account:      [id number]   :      1960           Service Date: 2018      Document: [id number]         Outpatient Encounter Prescriptions as of 2018   Medication Sig Dispense Refill     albuterol (PROAIR HFA/PROVENTIL HFA/VENTOLIN HFA) 108 (90 Base) MCG/ACT Inhaler Inhale 2 puffs into the lungs every 6 hours as needed for shortness of breath / dyspnea or wheezing 3 Inhaler 1     bismuth subsalicylate (PEPTO BISMOL) 262 MG/15ML suspension Take 15  mLs by mouth every 6 hours as needed for indigestion       flecainide acetate 150 MG TABS Take 1 tablet (150 mg) by mouth every 12 hours 60 tablet 0     furosemide (LASIX) 20 MG tablet Take 1 tablet (20 mg) by mouth daily 30 tablet 11     metoprolol succinate (TOPROL XL) 25 MG 24 hr tablet Take 1 tablet (25 mg) by mouth daily 30 tablet 11     ranitidine (ZANTAC) 150 MG tablet Take 1 tablet (150 mg) by mouth daily 30 tablet 2     rivaroxaban ANTICOAGULANT (XARELTO) 15 MG TABS tablet Take 1 tablet (15 mg) by mouth daily (with dinner) 30 tablet      No facility-administered encounter medications on file as of 11/5/2018.        Again, thank you for allowing me to participate in the care of your patient.      Sincerely,    Jillian Solomon PA-C     [facility]

## 2018-11-05 NOTE — PROGRESS NOTES
HPI and Plan:   See dictation    Orders Placed This Encounter   Procedures     Follow-Up with Electrophysiologist     EKG 12-lead complete w/read - Clinics (performed today)     EKG 12-lead complete w/read (Future)- to be scheduled       No orders of the defined types were placed in this encounter.      There are no discontinued medications.      Encounter Diagnoses   Name Primary?     Atrial fibrillation (H) Yes     Persistent atrial fibrillation (H)        CURRENT MEDICATIONS:  Current Outpatient Prescriptions   Medication Sig Dispense Refill     albuterol (PROAIR HFA/PROVENTIL HFA/VENTOLIN HFA) 108 (90 Base) MCG/ACT Inhaler Inhale 2 puffs into the lungs every 6 hours as needed for shortness of breath / dyspnea or wheezing 3 Inhaler 1     bismuth subsalicylate (PEPTO BISMOL) 262 MG/15ML suspension Take 15 mLs by mouth every 6 hours as needed for indigestion       flecainide acetate 150 MG TABS Take 1 tablet (150 mg) by mouth every 12 hours 60 tablet 0     furosemide (LASIX) 20 MG tablet Take 1 tablet (20 mg) by mouth daily 30 tablet 11     metoprolol succinate (TOPROL XL) 25 MG 24 hr tablet Take 1 tablet (25 mg) by mouth daily 30 tablet 11     ranitidine (ZANTAC) 150 MG tablet Take 1 tablet (150 mg) by mouth daily 30 tablet 2     rivaroxaban ANTICOAGULANT (XARELTO) 15 MG TABS tablet Take 1 tablet (15 mg) by mouth daily (with dinner) 30 tablet        ALLERGIES   No Known Allergies    PAST MEDICAL HISTORY:  Past Medical History:   Diagnosis Date     Hyperthyroidism      Persistent atrial fibrillation (H)        PAST SURGICAL HISTORY:  Past Surgical History:   Procedure Laterality Date     CHOLECYSTECTOMY         FAMILY HISTORY:  Family History   Problem Relation Age of Onset     Lung Cancer Mother      Lung Cancer Father      Cerebrovascular Disease Brother      Family History Negative Brother      Family History Negative Brother        SOCIAL HISTORY:  Social History     Social History     Marital status:       Spouse name: N/A     Number of children: N/A     Years of education: N/A     Social History Main Topics     Smoking status: Never Smoker     Smokeless tobacco: Never Used     Alcohol use No     Drug use: No     Sexual activity: Yes     Partners: Male     Other Topics Concern     Parent/Sibling W/ Cabg, Mi Or Angioplasty Before 65f 55m? No     Social History Narrative       Review of Systems:  Skin:  Negative       Eyes:  Negative      ENT:  Negative      Respiratory:  Negative       Cardiovascular:  Negative      Gastroenterology: Negative      Genitourinary:  Negative      Musculoskeletal:  Negative      Neurologic:  Negative      Psychiatric:  Negative      Heme/Lymph/Imm:  Negative      Endocrine:  Negative        Physical Exam:  Vitals: There were no vitals taken for this visit.    Constitutional:  cooperative, alert and oriented, well developed, well nourished, in no acute distress        Skin:  warm and dry to the touch, no apparent skin lesions or masses noted          Head:  normocephalic, no masses or lesions        Eyes:  pupils equal and round, conjunctivae and lids unremarkable, sclera white, no xanthalasma, EOMS intact, no nystagmus        Lymph:No Cervical lymphadenopathy present     ENT:  no pallor or cyanosis, dentition good        Neck:  carotid pulses are full and equal bilaterally, JVP normal, no carotid bruit        Respiratory:  normal breath sounds, clear to auscultation, normal A-P diameter, normal symmetry, normal respiratory excursion, no use of accessory muscles         Cardiac: regular rhythm, normal S1/S2, no S3 or S4, apical impulse not displaced, no murmurs, gallops or rubs                                                         GI:  abdomen soft, non-tender, BS normoactive, no mass, no HSM, no bruits        Extremities and Muscular Skeletal:  no deformities, clubbing, cyanosis, erythema observed              Neurological:  no gross motor deficits        Psych:  Alert and Oriented x  3        CC  No referring provider defined for this encounter.

## 2018-11-06 NOTE — PROGRESS NOTES
Service Date: 11/05/2018      PRIMARY CARDIOLOGIST:  Dr. Enrique for heart failure, Dr. Quezada for AFib.      HISTORY OF PRESENT ILLNESS:  Ms. Caballero is a 58-year-old who had not sought medical care for many years when she presented to the ER in July with shortness of breath.  She was found to have AFib with RVR and systolic heart failure.  Initial echocardiogram showed an EF of 45%, but a RANDY done for cardioversion showed an EF of around 30%-35%.  She was also found to have severe mitral and moderate to severe tricuspid regurgitation and hyperthyroidism.  She was started on methimazole.  She had recurrence of her AFib and then resolution and was started on flecainide by Dr. Quezada.  She was asked to follow up in [facility] early on but for some reason failed this.  She has been seen by primary.      She comes in today for followup of these events.  She has not seen Endocrine.  She is no longer on methimazole.  She did have a TSH done last month by primary care provider, and that was normal.  She says she feels back to herself.  She feels normal.  She can walk for 1 hour a day, but she thinks this is about a mile.  She walks slowly.  She denies orthopnea or PND.  She does not have swelling or shortness of breath.  That being said, she is up 10 pounds from the hospital.  She does feel puffy in her face and puffy in her abdomen.  She is not sure why she was not discharged on a diuretic, and nor can I find explanation in her paperwork.  She has been taking her Xarelto.  She also has been taking her flecainide.      SOCIAL HISTORY:  Unfortunately, she does not have any insurance.  Everything is paid for out of pocket.  She comes in today with her significant other, Lloyd.  Apparently she grew up in the [facility], and they have been living together for about 9 years.  She is a lifelong nonsmoker.  No alcohol use.      PHYSICAL EXAMINATION:   GENERAL:  Exam reveals a well-developed, well-nourished woman in no acute distress.    HEENT:  Normocephalic, atraumatic.  Heart is regular in rate and rhythm, 2/6 systolic murmur heard best at the left sternal border.   LUNGS:  Clear, without wheezes, rales or rhonchi.  She has trace peripheral edema.   ABDOMEN:  Soft.  I do not appreciate JVP at 30 degrees, although it is difficult to assess secondary to body habitus.  I do not appreciate liver border.      LABORATORY DATA:  Labs today show creatinine 1.07, BUN 16, potassium 4.3, sodium 139.      ASSESSMENT AND PLAN:    1.  Acute systolic heart failure with EF initially somewhere between 30% and 45%, unclear as RANDY showed lower than transthoracic.  This is likely secondary to tachycardia, and she will need reassessment of her EF to determine next steps.  Unfortunately, there seemed to be a mix-up in her medications.  She was not discharged on a beta blocker or ACE nor a diuretic.  She does feel puffy today, and although I do not see any marked hypervolemia, if her weight is up, we will try a low dose of Lasix at 20 mg daily and see if this helps.  In addition, she should at least be on Toprol, and this was started today at 25 mg daily.  Ideally, we would have her on an ACE, and this is not started.  Given how far out we are from the hospital and how many things have fallen through the cracks with her, we are going to actually step back and do a repeat echocardiogram.  If her EF is normal or near normal, she would not need the addition of an ACE or spironolactone.  She could stay just on metoprolol.  If her EF is low, we will certainly need to do much further workup, optimization of meds and then consideration of an ischemic evaluation.  In addition, I will do a TSH today, as if she is still hyperthyroid, that will be contributing to her cardiomyopathy.   2.  Atrial fibrillation, clinically in sinus rhythm today, on flecainide, but not beta blocker.  This was started today.  She is going to visit with Dr. Quezada this afternoon for further discussion of  that.  Again, hyperthyroidism is an issue.  Her CHADS-VASc is at least 2 for female gender and heart failure, and she should remain on anticoagulation today which is 15 mg of Xarelto.   3.  Likely sleep apnea.  Without insurance coverage, will not pursue a sleep study at this time.   4.  Severe mitral and moderate to severe tricuspid regurgitation, possibly functional.  Will follow and get repeat echocardiogram.   5.  Social, ideally she would have insurance.  This should be pursued through open enrollment this year.  She is going to need to be on long-term medications down the road.      As she does not have insurance, we will determine next steps based on her echocardiogram.  We will get an echocardiogram.  If it has normalized, we will have her follow up in something like 6 months.  If it has not normalized, we will see her back in a week or 2 to start with med optimization.  I did discuss this plan with the patient and her , and the  is encouraging.      Thank you for allowing me to participate in this delightful patient's care.      WINSOME Mcguire PA-C             D: 2018   T: 2018   MT: COURTNEY      Name:     ALEXANDRO SHERIDAN   MRN:      [id number]        Account:      [id number]   :      1960           Service Date: 2018      Document: [id number]

## 2018-11-07 ENCOUNTER — HOSPITAL ENCOUNTER (OUTPATIENT)
Dept: CARDIOLOGY | Facility: CLINIC | Age: 58
Discharge: HOME | End: 2018-11-07
Attending: PHYSICIAN ASSISTANT | Admitting: PHYSICIAN ASSISTANT

## 2018-11-07 DIAGNOSIS — I50.23 ACUTE ON CHRONIC SYSTOLIC CONGESTIVE HEART FAILURE (H): ICD-10-CM

## 2018-11-07 PROCEDURE — 93306 TTE W/DOPPLER COMPLETE: CPT | Mod: 26 | Performed by: INTERNAL MEDICINE

## 2018-11-07 PROCEDURE — 93306 TTE W/DOPPLER COMPLETE: CPT

## 2018-11-08 ENCOUNTER — CARE COORDINATION (OUTPATIENT)
Dept: CARDIOLOGY | Facility: CLINIC | Age: 58
End: 2018-11-08

## 2018-11-08 NOTE — PROGRESS NOTES
Called and spoke with pt.  Discussed Echo results and reviewed WINSOME Connell's recommendations to stop xarelto and continue flecainide and Toprol.  Pt verbalized understanding and then asks if she should continue taking lasix or stop.  Told pt that will review with Ko and call back with recommendation- she agrees with this plan.    Also reviewed that she does not need to continue seeing Ko in [facility] since CM has resolved and Ko recommends she follow up with Dr. Quezada in 6 months, which is already ordred in Epic. She verbalized understanding and agrees with this plan too.    Routed to [facility] for review.    Manuel RN 3:41 PM 11/8/2018

## 2018-11-09 NOTE — PROGRESS NOTES
Called and spoke with pt.  Reviewed that WINSOME Connell recommends she stop lasic and see her PMD for worsening SOB.  Pt verbalized understanding and agrees with this plan.    JOAQUINA Jackson 9:03 AM 11/9/2018

## 2018-11-30 ENCOUNTER — CARE COORDINATION (OUTPATIENT)
Dept: CARDIOLOGY | Facility: CLINIC | Age: 58
End: 2018-11-30

## 2018-11-30 DIAGNOSIS — R60.9 EDEMA: ICD-10-CM

## 2018-11-30 DIAGNOSIS — I48.91 ATRIAL FIBRILLATION WITH RVR (H): ICD-10-CM

## 2018-11-30 DIAGNOSIS — I50.23 ACUTE ON CHRONIC SYSTOLIC CONGESTIVE HEART FAILURE (H): ICD-10-CM

## 2018-11-30 RX ORDER — FLECAINIDE ACETATE 150 MG/1
150 TABLET ORAL EVERY 12 HOURS
Qty: 180 TABLET | Refills: 3 | Status: SHIPPED | OUTPATIENT
Start: 2018-11-30 | End: 2019-08-06

## 2018-11-30 RX ORDER — METOPROLOL SUCCINATE 25 MG/1
25 TABLET, EXTENDED RELEASE ORAL DAILY
Qty: 90 TABLET | Refills: 3 | Status: SHIPPED | OUTPATIENT
Start: 2018-11-30 | End: 2019-02-22

## 2018-11-30 RX ORDER — FUROSEMIDE 20 MG
20 TABLET ORAL DAILY
Qty: 30 TABLET | Refills: 2 | Status: SHIPPED | OUTPATIENT
Start: 2018-11-30 | End: 2019-02-22

## 2018-11-30 NOTE — PROGRESS NOTES
Pt previously on furosemide 20mg daily-would you like her to cont that dose? Or only take 20mg PRN wt gain of >3# in 24hrs and/or if increased edema?    Korina Dill RN BSN   1:24 PM 11/30/18      Reviewed w/ Nargis: pt may take furosemide 20mg daily or every other day depending on if swelling present.     Called pt and reviewed Nargis's instructions. She agreed to plan and requested rx be sent to [facility].    I told her Nargis would like repeat BMP in 6-8wks (which I ordered) and pt preferred to call back to schedule. I asked her to call RN if worsening edema, SOB/GRAYSON, wt gain, questions.    Korina Dill RN BSN   4:02 PM 11/30/18

## 2018-11-30 NOTE — PROGRESS NOTES
"Pt called to ask if she should cont furosemide. She reported wt has gradually come up to 164# and now has \"puffy face\". She denied any SOB/GRAYSON, other edema, orthopnea. Takes BP/HR daily, last reading 100/70, HR 68.     Pt seen by Jillian Solomon PAC 11/5/18, home wt 161#. Hx of SOB, NELY, HFrEF, VHD, hyperthyroid. That day pt was in SR, wt was up 10# from hospital discharge and was not d/c'd on diuretic, BB, nor ACEI for unclear reasons. Per Jillian, \"She does feel puffy in her face and puffy in her abdomen.\"    Jillian started furosemide 20mg daily, metoprolol succinate 25mg daily. Echo ordered.    Also seen by Dr. Rodriguez 11/5 and per him, \"Ms. Caballero is doing well symptomatically.  She has remained in sinus rhythm on flecainide.  She is waiting for repeat echocardiography this Wednesday.  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  If her EF is back to normal, she can stop Xarelto for anticoagulation.\"    Repeat echo showed normalized EF and improved valvular fxn. She was instructed to stop xarelto and furosemide. She was asked to cont flecainide and metoprolol succinate and told she didn't need to cont [facility]. Orders are to follow-up w/ Dr. Quezada in 6 mos.     Nargis-will you please review in Jillian's absence? Do you want her to take brief course of furosemide? Thanks!    Korina Dill RN BSN   11:47 AM 11/30/18        "

## 2018-11-30 NOTE — TELEPHONE ENCOUNTER
Luz Elena has acute on chronic systolic congestive heart failure and PAF.  Patient was seen by Dr. Quezada on 11/5/18, had an EKG, BMP, TSH, and an echo on 11/7/18.   Echo showed EF of 55-60%.  Mitral valve regurg was mild (1+). Will refill medications.  Since EF is back to normal, Dr. Quezada said she can discontinue Xarelto.  Furosemide was not sent because pt was supposed to stop it 11/9/18.  Called her and she said that she is gaining more fluid weight.  Sent phone call to [facility].  JOAQUINA Robertson 11/30/2018

## 2019-02-22 ENCOUNTER — TELEPHONE (OUTPATIENT)
Dept: CARDIOLOGY | Facility: CLINIC | Age: 59
End: 2019-02-22

## 2019-02-22 DIAGNOSIS — R60.9 EDEMA: ICD-10-CM

## 2019-02-22 DIAGNOSIS — I50.23 ACUTE ON CHRONIC SYSTOLIC CONGESTIVE HEART FAILURE (H): ICD-10-CM

## 2019-02-22 RX ORDER — FUROSEMIDE 20 MG
20 TABLET ORAL DAILY
Qty: 90 TABLET | Refills: 0 | Status: SHIPPED | OUTPATIENT
Start: 2019-02-22 | End: 2019-05-29

## 2019-02-22 RX ORDER — METOPROLOL SUCCINATE 25 MG/1
25 TABLET, EXTENDED RELEASE ORAL DAILY
Qty: 90 TABLET | Refills: 0 | Status: SHIPPED | OUTPATIENT
Start: 2019-02-22 | End: 2019-05-29

## 2019-02-22 NOTE — TELEPHONE ENCOUNTER
Patient called requesting refill on flecainide.  Informed patient that she has refills left at the [facility] pharmacy.  She indicated she will call them.  Also reminded patient she is overdue for follow up BMP.  Offered to schedule this. Patient declined and indicated she would schedule it when she has a ride as she does not drive.  JOAQUINA Keith

## 2019-04-26 DIAGNOSIS — R60.9 EDEMA: ICD-10-CM

## 2019-04-26 LAB
ANION GAP SERPL CALCULATED.3IONS-SCNC: 12.9 MMOL/L (ref 6–17)
BUN SERPL-MCNC: 16 MG/DL (ref 7–30)
CALCIUM SERPL-MCNC: 9.7 MG/DL (ref 8.5–10.5)
CHLORIDE SERPL-SCNC: 103 MMOL/L (ref 98–107)
CO2 SERPL-SCNC: 25 MMOL/L (ref 23–29)
CREAT SERPL-MCNC: 1.2 MG/DL (ref 0.7–1.3)
GFR SERPL CREATININE-BSD FRML MDRD: 46 ML/MIN/{1.73_M2}
GLUCOSE SERPL-MCNC: 183 MG/DL (ref 70–105)
POTASSIUM SERPL-SCNC: 3.9 MMOL/L (ref 3.5–5.1)
SODIUM SERPL-SCNC: 137 MMOL/L (ref 136–145)

## 2019-04-26 PROCEDURE — 36415 COLL VENOUS BLD VENIPUNCTURE: CPT | Performed by: NURSE PRACTITIONER

## 2019-04-26 PROCEDURE — 80048 BASIC METABOLIC PNL TOTAL CA: CPT | Performed by: NURSE PRACTITIONER

## 2019-05-29 ENCOUNTER — CARE COORDINATION (OUTPATIENT)
Dept: CARDIOLOGY | Facility: CLINIC | Age: 59
End: 2019-05-29

## 2019-05-29 DIAGNOSIS — R60.9 EDEMA: ICD-10-CM

## 2019-05-29 DIAGNOSIS — I50.23 ACUTE ON CHRONIC SYSTOLIC CONGESTIVE HEART FAILURE (H): ICD-10-CM

## 2019-05-29 RX ORDER — FUROSEMIDE 20 MG
20 TABLET ORAL DAILY
Qty: 90 TABLET | Refills: 0 | Status: SHIPPED | OUTPATIENT
Start: 2019-05-29 | End: 2019-05-30

## 2019-05-29 RX ORDER — METOPROLOL SUCCINATE 25 MG/1
25 TABLET, EXTENDED RELEASE ORAL DAILY
Qty: 90 TABLET | Refills: 0 | Status: SHIPPED | OUTPATIENT
Start: 2019-05-29 | End: 2019-08-06

## 2019-05-29 NOTE — PROGRESS NOTES
Patient calling back w/request for refills. Metoprolol and lasix sent to her preferred pharmacy.    Per Epic review, patient overdue for f/u w/Dr Quezada, call transferred to scheduling.  Michelle Marie RN on 5/29/2019 at 4:11 PM

## 2019-05-29 NOTE — PROGRESS NOTES
Incoming call from patient w/request to review her latest lab results:  Component      Latest Ref Rng & Units 4/26/2019   Sodium      136 - 145 mmol/L 137   Potassium      3.5 - 5.1 mmol/L 3.9   Chloride      98 - 107 mmol/L 103   Carbon Dioxide      23 - 29 mmol/L 25   Anion Gap      6 - 17 mmol/L 12.9   Glucose      70 - 105 mg/dL 183 (H)   Urea Nitrogen      7 - 30 mg/dL 16   Creatinine      0.70 - 1.30 mg/dL 1.20   GFR Estimate      >60 mL/min/1.73:m2 46 (L)   GFR Estimate If Black      >60 mL/min/1.73:m2 56 (L)   Calcium      8.5 - 10.5 mg/dL 9.7     Reviewed above and answered patients questions.  Michelle Marie RN on 5/29/2019 at 3:36 PM

## 2019-05-30 DIAGNOSIS — R60.9 EDEMA: ICD-10-CM

## 2019-05-30 RX ORDER — FUROSEMIDE 20 MG
20 TABLET ORAL DAILY
Qty: 90 TABLET | Refills: 0 | Status: SHIPPED | OUTPATIENT
Start: 2019-05-30 | End: 2020-08-26

## 2019-08-06 ENCOUNTER — OFFICE VISIT (OUTPATIENT)
Dept: CARDIOLOGY | Facility: CLINIC | Age: 59
End: 2019-08-06

## 2019-08-06 VITALS
SYSTOLIC BLOOD PRESSURE: 123 MMHG | WEIGHT: 171.3 LBS | HEART RATE: 78 BPM | DIASTOLIC BLOOD PRESSURE: 78 MMHG | BODY MASS INDEX: 29.24 KG/M2 | HEIGHT: 64 IN

## 2019-08-06 DIAGNOSIS — I48.91 ATRIAL FIBRILLATION WITH RVR (H): ICD-10-CM

## 2019-08-06 DIAGNOSIS — I50.23 ACUTE ON CHRONIC SYSTOLIC CONGESTIVE HEART FAILURE (H): ICD-10-CM

## 2019-08-06 DIAGNOSIS — I48.19 PERSISTENT ATRIAL FIBRILLATION (H): Primary | ICD-10-CM

## 2019-08-06 PROCEDURE — 93000 ELECTROCARDIOGRAM COMPLETE: CPT | Performed by: INTERNAL MEDICINE

## 2019-08-06 PROCEDURE — 99214 OFFICE O/P EST MOD 30 MIN: CPT | Performed by: INTERNAL MEDICINE

## 2019-08-06 RX ORDER — FLECAINIDE ACETATE 150 MG/1
150 TABLET ORAL EVERY 12 HOURS
Qty: 180 TABLET | Refills: 3 | Status: SHIPPED | OUTPATIENT
Start: 2019-08-06 | End: 2020-08-27

## 2019-08-06 RX ORDER — METOPROLOL SUCCINATE 25 MG/1
25 TABLET, EXTENDED RELEASE ORAL DAILY
Qty: 90 TABLET | Refills: 3 | Status: SHIPPED | OUTPATIENT
Start: 2019-08-06 | End: 2020-08-24

## 2019-08-06 ASSESSMENT — MIFFLIN-ST. JEOR: SCORE: 1342.01

## 2019-08-06 NOTE — PROGRESS NOTES
Service Date: 2019      HISTORY OF PRESENT ILLNESS:  I saw Ms. Sheridan for followup of atrial fibrillation.  She is a 59-year-old [place] who presented with atrial fibrillation with reduced ejection fraction in 2018.  She had subsequent normalization of LV function.  The patient is off anticoagulation at the present time.  She has not had recurrent atrial fibrillation on flecainide 150 mg p.o. b.i.d.  So far she has not had apparent side effects from flecainide.  Over the last 6 months, she has not had any ER visit or hospitalization.  At the present time, she has no complaints.      PHYSICAL EXAMINATION:   VITAL SIGNS:  Blood pressure was 123/70, heart rate 78 beats per minute, body weight 171 pounds.   HEENT:  Eyes and ENT were unremarkable.   LUNGS:  Clear.   CARDIAC:  Rhythm was regular and heart sounds were normal with no murmur.   ABDOMEN:  Showed moderate obesity.   EXTREMITIES:  There was no pedal edema.      Today's EKG showed a sinus rhythm with QRS duration 102 milliseconds.      ASSESSMENT AND RECOMMENDATIONS:  Ms. Sheridan is doing well symptomatically.  She has remained in sinus rhythm on flecainide.  The QRS duration is acceptable.  She can continue the current medications and return for followup in 1 year.      cc:      Sona Patiño PA-C   [facility]   [address] [place] [address]         MANJINDER HAN MD             D: 2019   T: 2019   MT: FEDERICO      Name:     ALEXANDRO SHERIDAN   MRN:      [id number]        Account:      [id number]   :      1960           Service Date: 2019      Document: [id number]

## 2019-08-06 NOTE — LETTER
8/6/2019    Sona Cueto PA-C  [address]  [facility] [address]    RE: Luz Elena Caballero       Dear Colleague,    I had the pleasure of seeing Luz Elena Caballero in the [facility].    HPI and Plan:   See dictation    Orders Placed This Encounter   Procedures     Follow-Up with Electrophysiologist     Follow-Up with Cardiac Advanced Practice Provider     EKG 12-lead complete w/read - Clinics (performed today)     EKG 12-lead complete w/read (Future)- to be scheduled       Orders Placed This Encounter   Medications     flecainide (TAMBOCOR) 150 MG tablet     Sig: Take 1 tablet (150 mg) by mouth every 12 hours     Dispense:  180 tablet     Refill:  3     metoprolol succinate ER (TOPROL XL) 25 MG 24 hr tablet     Sig: Take 1 tablet (25 mg) by mouth daily     Dispense:  90 tablet     Refill:  3       Medications Discontinued During This Encounter   Medication Reason     flecainide (TAMBOCOR) 150 MG tablet Reorder     metoprolol succinate ER (TOPROL XL) 25 MG 24 hr tablet Reorder         Encounter Diagnoses   Name Primary?     Persistent atrial fibrillation (H) Yes     Atrial fibrillation with RVR (H)      Acute on chronic systolic congestive heart failure (H)        CURRENT MEDICATIONS:  Current Outpatient Medications   Medication Sig Dispense Refill     albuterol (PROAIR HFA/PROVENTIL HFA/VENTOLIN HFA) 108 (90 Base) MCG/ACT Inhaler Inhale 2 puffs into the lungs every 6 hours as needed for shortness of breath / dyspnea or wheezing 3 Inhaler 1     bismuth subsalicylate (PEPTO BISMOL) 262 MG/15ML suspension Take 15 mLs by mouth every 6 hours as needed for indigestion       flecainide (TAMBOCOR) 150 MG tablet Take 1 tablet (150 mg) by mouth every 12 hours 180 tablet 3     furosemide (LASIX) 20 MG tablet Take 1 tablet (20 mg) by mouth daily Or as directed by cardiology 90 tablet 0     metoprolol succinate ER (TOPROL XL) 25 MG 24 hr tablet Take 1 tablet (25 mg) by  mouth daily 90 tablet 3     ranitidine (ZANTAC) 150 MG tablet Take 1 tablet (150 mg) by mouth daily 30 tablet 2       ALLERGIES   No Known Allergies    PAST MEDICAL HISTORY:  Past Medical History:   Diagnosis Date     Hyperthyroidism      Persistent atrial fibrillation (H)        PAST SURGICAL HISTORY:  Past Surgical History:   Procedure Laterality Date     CHOLECYSTECTOMY         FAMILY HISTORY:  Family History   Problem Relation Age of Onset     Lung Cancer Mother      Lung Cancer Father      Cerebrovascular Disease Brother      Family History Negative Brother      Family History Negative Brother        SOCIAL HISTORY:  Social History     Socioeconomic History     Marital status:      Spouse name: None     Number of children: None     Years of education: None     Highest education level: None   Occupational History     None   Social Needs     Financial resource strain: None     Food insecurity:     Worry: None     Inability: None     Transportation needs:     Medical: None     Non-medical: None   Tobacco Use     Smoking status: Never Smoker     Smokeless tobacco: Never Used   Substance and Sexual Activity     Alcohol use: No     Drug use: No     Sexual activity: Yes     Partners: Male   Lifestyle     Physical activity:     Days per week: None     Minutes per session: None     Stress: None   Relationships     Social connections:     Talks on phone: None     Gets together: None     Attends [employer] service: None     Active member of club or organization: None     Attends meetings of clubs or organizations: None     Relationship status: None     Intimate partner violence:     Fear of current or ex partner: None     Emotionally abused: None     Physically abused: None     Forced sexual activity: None   Other Topics Concern     Parent/sibling w/ CABG, MI or angioplasty before 65F 55M? No   Social History Narrative     None       Review of Systems:  Skin:  Negative       Eyes:  Negative      ENT:  Negative     "  Respiratory:  Negative       Cardiovascular:  Negative      Gastroenterology: Negative      Genitourinary:  Negative      Musculoskeletal:  Negative      Neurologic:  Negative      Psychiatric:  Negative      Heme/Lymph/Imm:  Negative      Endocrine:  Negative        Physical Exam:  Vitals: /78   Pulse 78   Ht 1.626 m (5' 4\")   Wt 77.7 kg (171 lb 4.8 oz)   BMI 29.40 kg/m       Constitutional:  cooperative, alert and oriented, well developed, well nourished, in no acute distress        Skin:  warm and dry to the touch, no apparent skin lesions or masses noted          Head:  normocephalic, no masses or lesions        Eyes:  pupils equal and round, conjunctivae and lids unremarkable, sclera white, no xanthalasma, EOMS intact, no nystagmus        Lymph:No Cervical lymphadenopathy present     ENT:  no pallor or cyanosis, dentition good        Neck:  carotid pulses are full and equal bilaterally, JVP normal, no carotid bruit        Respiratory:  normal breath sounds, clear to auscultation, normal A-P diameter, normal symmetry, normal respiratory excursion, no use of accessory muscles         Cardiac: regular rhythm, normal S1/S2, no S3 or S4, apical impulse not displaced, no murmurs, gallops or rubs                                                         GI:  abdomen soft, non-tender, BS normoactive, no mass, no HSM, no bruits        Extremities and Muscular Skeletal:  no deformities, clubbing, cyanosis, erythema observed              Neurological:  no gross motor deficits        Psych:  Alert and Oriented x 3        [place] [address]                Thank you for allowing me to participate in the care of your patient.      Sincerely,     Quinn Quezada MD     [facility]    cc:   [place] [address]        "

## 2019-08-06 NOTE — LETTER
2019      Sona Cueto PA-C  [address] [address]      RE: Alexandro Sheridan       Dear Colleague,    I had the pleasure of seeing Alexandro Sheridan in the [facility].    Service Date: 2019      HISTORY OF PRESENT ILLNESS:  I saw Ms. Sheridan for followup of atrial fibrillation.  She is a 59-year-old [place] who presented with atrial fibrillation with reduced ejection fraction in 2018.  She had subsequent normalization of LV function.  The patient is off anticoagulation at the present time.  She has not had recurrent atrial fibrillation on flecainide 150 mg p.o. b.i.d.  So far she has not had apparent side effects from flecainide.  Over the last 6 months, she has not had any ER visit or hospitalization.  At the present time, she has no complaints.      PHYSICAL EXAMINATION:   VITAL SIGNS:  Blood pressure was 123/70, heart rate 78 beats per minute, body weight 171 pounds.   HEENT:  Eyes and ENT were unremarkable.   LUNGS:  Clear.   CARDIAC:  Rhythm was regular and heart sounds were normal with no murmur.   ABDOMEN:  Showed moderate obesity.   EXTREMITIES:  There was no pedal edema.      Today's EKG showed a sinus rhythm with QRS duration 102 milliseconds.      ASSESSMENT AND RECOMMENDATIONS:  Ms. Sheridan is doing well symptomatically.  She has remained in sinus rhythm on flecainide.  The QRS duration is acceptable.  She can continue the current medications and return for followup in 1 year.      cc:      Sona Patiño PA-C   [facility]   [address] [place] [address]         MANJINDER HAN MD             D: 2019   T: 2019   MT: FEDERICO      Name:     ALEXANDRO SHERIDAN   MRN:      [id number]        Account:      [id number]   :      1960           Service Date: 2019      Document: [id number]           Outpatient Encounter Medications as of 2019   Medication Sig Dispense Refill     albuterol  (PROAIR HFA/PROVENTIL HFA/VENTOLIN HFA) 108 (90 Base) MCG/ACT Inhaler Inhale 2 puffs into the lungs every 6 hours as needed for shortness of breath / dyspnea or wheezing 3 Inhaler 1     bismuth subsalicylate (PEPTO BISMOL) 262 MG/15ML suspension Take 15 mLs by mouth every 6 hours as needed for indigestion       flecainide (TAMBOCOR) 150 MG tablet Take 1 tablet (150 mg) by mouth every 12 hours 180 tablet 3     furosemide (LASIX) 20 MG tablet Take 1 tablet (20 mg) by mouth daily Or as directed by cardiology 90 tablet 0     metoprolol succinate ER (TOPROL XL) 25 MG 24 hr tablet Take 1 tablet (25 mg) by mouth daily 90 tablet 3     ranitidine (ZANTAC) 150 MG tablet Take 1 tablet (150 mg) by mouth daily 30 tablet 2     [DISCONTINUED] flecainide (TAMBOCOR) 150 MG tablet Take 1 tablet (150 mg) by mouth every 12 hours 180 tablet 3     [DISCONTINUED] metoprolol succinate ER (TOPROL XL) 25 MG 24 hr tablet Take 1 tablet (25 mg) by mouth daily 90 tablet 0     No facility-administered encounter medications on file as of 8/6/2019.        Again, thank you for allowing me to participate in the care of your patient.      Sincerely,    Quinn Quezada MD     [facility]

## 2019-08-06 NOTE — PROGRESS NOTES
HPI and Plan:   See dictation    Orders Placed This Encounter   Procedures     Follow-Up with Electrophysiologist     Follow-Up with Cardiac Advanced Practice Provider     EKG 12-lead complete w/read - Clinics (performed today)     EKG 12-lead complete w/read (Future)- to be scheduled       Orders Placed This Encounter   Medications     flecainide (TAMBOCOR) 150 MG tablet     Sig: Take 1 tablet (150 mg) by mouth every 12 hours     Dispense:  180 tablet     Refill:  3     metoprolol succinate ER (TOPROL XL) 25 MG 24 hr tablet     Sig: Take 1 tablet (25 mg) by mouth daily     Dispense:  90 tablet     Refill:  3       Medications Discontinued During This Encounter   Medication Reason     flecainide (TAMBOCOR) 150 MG tablet Reorder     metoprolol succinate ER (TOPROL XL) 25 MG 24 hr tablet Reorder         Encounter Diagnoses   Name Primary?     Persistent atrial fibrillation (H) Yes     Atrial fibrillation with RVR (H)      Acute on chronic systolic congestive heart failure (H)        CURRENT MEDICATIONS:  Current Outpatient Medications   Medication Sig Dispense Refill     albuterol (PROAIR HFA/PROVENTIL HFA/VENTOLIN HFA) 108 (90 Base) MCG/ACT Inhaler Inhale 2 puffs into the lungs every 6 hours as needed for shortness of breath / dyspnea or wheezing 3 Inhaler 1     bismuth subsalicylate (PEPTO BISMOL) 262 MG/15ML suspension Take 15 mLs by mouth every 6 hours as needed for indigestion       flecainide (TAMBOCOR) 150 MG tablet Take 1 tablet (150 mg) by mouth every 12 hours 180 tablet 3     furosemide (LASIX) 20 MG tablet Take 1 tablet (20 mg) by mouth daily Or as directed by cardiology 90 tablet 0     metoprolol succinate ER (TOPROL XL) 25 MG 24 hr tablet Take 1 tablet (25 mg) by mouth daily 90 tablet 3     ranitidine (ZANTAC) 150 MG tablet Take 1 tablet (150 mg) by mouth daily 30 tablet 2       ALLERGIES   No Known Allergies    PAST MEDICAL HISTORY:  Past Medical History:   Diagnosis Date     Hyperthyroidism       Persistent atrial fibrillation (H)        PAST SURGICAL HISTORY:  Past Surgical History:   Procedure Laterality Date     CHOLECYSTECTOMY         FAMILY HISTORY:  Family History   Problem Relation Age of Onset     Lung Cancer Mother      Lung Cancer Father      Cerebrovascular Disease Brother      Family History Negative Brother      Family History Negative Brother        SOCIAL HISTORY:  Social History     Socioeconomic History     Marital status:      Spouse name: None     Number of children: None     Years of education: None     Highest education level: None   Occupational History     None   Social Needs     Financial resource strain: None     Food insecurity:     Worry: None     Inability: None     Transportation needs:     Medical: None     Non-medical: None   Tobacco Use     Smoking status: Never Smoker     Smokeless tobacco: Never Used   Substance and Sexual Activity     Alcohol use: No     Drug use: No     Sexual activity: Yes     Partners: Male   Lifestyle     Physical activity:     Days per week: None     Minutes per session: None     Stress: None   Relationships     Social connections:     Talks on phone: None     Gets together: None     Attends Buddhism service: None     Active member of club or organization: None     Attends meetings of clubs or organizations: None     Relationship status: None     Intimate partner violence:     Fear of current or ex partner: None     Emotionally abused: None     Physically abused: None     Forced sexual activity: None   Other Topics Concern     Parent/sibling w/ CABG, MI or angioplasty before 65F 55M? No   Social History Narrative     None       Review of Systems:  Skin:  Negative       Eyes:  Negative      ENT:  Negative      Respiratory:  Negative       Cardiovascular:  Negative      Gastroenterology: Negative      Genitourinary:  Negative      Musculoskeletal:  Negative      Neurologic:  Negative      Psychiatric:  Negative      Heme/Lymph/Imm:  Negative     "  Endocrine:  Negative        Physical Exam:  Vitals: /78   Pulse 78   Ht 1.626 m (5' 4\")   Wt 77.7 kg (171 lb 4.8 oz)   BMI 29.40 kg/m      Constitutional:  cooperative, alert and oriented, well developed, well nourished, in no acute distress        Skin:  warm and dry to the touch, no apparent skin lesions or masses noted          Head:  normocephalic, no masses or lesions        Eyes:  pupils equal and round, conjunctivae and lids unremarkable, sclera white, no xanthalasma, EOMS intact, no nystagmus        Lymph:No Cervical lymphadenopathy present     ENT:  no pallor or cyanosis, dentition good        Neck:  carotid pulses are full and equal bilaterally, JVP normal, no carotid bruit        Respiratory:  normal breath sounds, clear to auscultation, normal A-P diameter, normal symmetry, normal respiratory excursion, no use of accessory muscles         Cardiac: regular rhythm, normal S1/S2, no S3 or S4, apical impulse not displaced, no murmurs, gallops or rubs                                                         GI:  abdomen soft, non-tender, BS normoactive, no mass, no HSM, no bruits        Extremities and Muscular Skeletal:  no deformities, clubbing, cyanosis, erythema observed              Neurological:  no gross motor deficits        Psych:  Alert and Oriented x 3        CC  [address] [address]              "

## 2020-08-24 DIAGNOSIS — I50.23 ACUTE ON CHRONIC SYSTOLIC CONGESTIVE HEART FAILURE (H): ICD-10-CM

## 2020-08-24 RX ORDER — METOPROLOL SUCCINATE 25 MG/1
25 TABLET, EXTENDED RELEASE ORAL DAILY
Qty: 90 TABLET | Refills: 0 | Status: SHIPPED | OUTPATIENT
Start: 2020-08-24 | End: 2020-11-10

## 2020-08-26 DIAGNOSIS — R60.9 EDEMA: ICD-10-CM

## 2020-08-26 RX ORDER — FUROSEMIDE 20 MG
20 TABLET ORAL DAILY
Qty: 90 TABLET | Refills: 0 | Status: SHIPPED | OUTPATIENT
Start: 2020-08-26 | End: 2020-11-10

## 2020-08-27 DIAGNOSIS — I48.91 ATRIAL FIBRILLATION WITH RVR (H): ICD-10-CM

## 2020-08-27 RX ORDER — FLECAINIDE ACETATE 150 MG/1
150 TABLET ORAL EVERY 12 HOURS
Qty: 180 TABLET | Refills: 0 | Status: SHIPPED | OUTPATIENT
Start: 2020-08-27 | End: 2020-11-10

## 2020-11-09 NOTE — PROGRESS NOTES
HPI:  Luz Elena Caballero is a 60 year old female who presents for annual cardiology visit.  She is a patient of .  Her medical history includes     1. atrial fibrillation   2. HFrEF  3. Obesity    In 7/2018, pt was dx with atrial fibrillation with reduced ejection fraction of 45%.   A RANDY was done for cardioversion showed an EF of around 30%-35%.  She was also found to have severe mitral and moderate to severe tricuspid regurgitation and hyperthyroidism.   She had a recurrence of atrial fibrillation and was started on flecainide.      ECHO (11/2018)   Left ventricular systolic function is normal.  The visual ejection fraction is estimated at 55-60%.  The right ventricular systolic function is normal.  There is mild (1+) mitral regurgitation.    In 2019, he met with Dr. Quezada and was on flecainide 150 mg twice daily with no recurrence of atrial fibrillation ad on no OAC      Today she reports no recurrence of atrial fibrillation.  She is taking furosemide every other day as this helps with overall edema - today she is euvolemic.   She denies sleep apnea symptoms.   She walks 1 miles per day and denies chest pain or pressure, dizziness, syncope, angina, dyspnea at rest or with exertion, palpitations, orthopnea, PND, or edema.  States takes medications as prescribed.      ASSESSMENT AND PLAN    Persistent Atrial fibrillation    Pt was dx with atrial fibrillation and tachycardia induced cardiomyopathy.  Her HF improved with rhythm control.     For rhythm control she is taking flecainide 150 mg twice daily and metoprolol XL 25 mg daily with no occurrence of atrial fibrillation.  ECG revealed sinus rhythm with ventricular rate of 67 bpm,  ms  This is consist with previous ECG's.      For anticoagulation for CHADS VASC 1 (female) she is currently not taking OAC.        HFrEF    Noted to be 45% in 7/2018    Resolved in 11/2018 ECHO     Continues to take furosemide 20 mg every other day    BMP  today    Plan:    TSH with reflex to free T4 due to weight gain    BMP now due to lasix    Continue current medications - can consider going to lasix every 3rd day to be decided when labs return    Follow up with Dr. Quezada in 1 year with ECHO and labs prior.         Patient expresses understanding and agreement with the plan.     I appreciate the chance to help with Luz Elena Moralesron Please let me know if you have any questions or concerns.    Jolanta Painter, APRN, CNP    This note was completed in part using Dragon voice recognition software. Although reviewed after completion, some word and grammatical errors may occur.    Orders Placed This Encounter   Procedures     Basic metabolic panel     Basic metabolic panel     TSH with free T4 reflex     Follow-Up with Electrophysiologist     EKG 12-lead complete w/read - Clinics (performed today)     Echocardiogram Complete     Orders Placed This Encounter   Medications     omeprazole (PRILOSEC) 20 MG DR capsule     Sig: Take 20 mg by mouth daily     flecainide (TAMBOCOR) 150 MG tablet     Sig: Take 1 tablet (150 mg) by mouth every 12 hours     Dispense:  180 tablet     Refill:  3     metoprolol succinate ER (TOPROL XL) 25 MG 24 hr tablet     Sig: Take 1 tablet (25 mg) by mouth daily     Dispense:  90 tablet     Refill:  3     furosemide (LASIX) 20 MG tablet     Sig: Take 1 tablet (20 mg) by mouth daily Or as directed by cardiology     Dispense:  90 tablet     Refill:  3     Medications Discontinued During This Encounter   Medication Reason     ranitidine (ZANTAC) 150 MG tablet      metoprolol succinate ER (TOPROL XL) 25 MG 24 hr tablet      furosemide (LASIX) 20 MG tablet      flecainide (TAMBOCOR) 150 MG tablet          Encounter Diagnoses   Name Primary?     Acute on chronic systolic congestive heart failure (H)      Stage 3a chronic kidney disease      Edema, unspecified type      Paroxysmal atrial fibrillation (H) Yes     Weight gain        CURRENT  MEDICATIONS:  Current Outpatient Medications   Medication Sig Dispense Refill     albuterol (PROAIR HFA/PROVENTIL HFA/VENTOLIN HFA) 108 (90 Base) MCG/ACT Inhaler Inhale 2 puffs into the lungs every 6 hours as needed for shortness of breath / dyspnea or wheezing 3 Inhaler 1     bismuth subsalicylate (PEPTO BISMOL) 262 MG/15ML suspension Take 15 mLs by mouth every 6 hours as needed for indigestion       flecainide (TAMBOCOR) 150 MG tablet Take 1 tablet (150 mg) by mouth every 12 hours 180 tablet 3     furosemide (LASIX) 20 MG tablet Take 1 tablet (20 mg) by mouth daily Or as directed by cardiology 90 tablet 3     metoprolol succinate ER (TOPROL XL) 25 MG 24 hr tablet Take 1 tablet (25 mg) by mouth daily 90 tablet 3     omeprazole (PRILOSEC) 20 MG DR capsule Take 20 mg by mouth daily         ALLERGIES   No Known Allergies    PAST MEDICAL HISTORY:  Past Medical History:   Diagnosis Date     Hyperthyroidism      Persistent atrial fibrillation (H)        PAST SURGICAL HISTORY:  Past Surgical History:   Procedure Laterality Date     CHOLECYSTECTOMY         FAMILY HISTORY:  Family History   Problem Relation Age of Onset     Lung Cancer Mother      Lung Cancer Father      Cerebrovascular Disease Brother      Family History Negative Brother      Family History Negative Brother        SOCIAL HISTORY:  Social History     Socioeconomic History     Marital status:      Spouse name: None     Number of children: None     Years of education: None     Highest education level: None   Occupational History     None   Social Needs     Financial resource strain: None     Food insecurity     Worry: None     Inability: None     Transportation needs     Medical: None     Non-medical: None   Tobacco Use     Smoking status: Never Smoker     Smokeless tobacco: Never Used   Substance and Sexual Activity     Alcohol use: No     Drug use: No     Sexual activity: Yes     Partners: Male   Lifestyle     Physical activity     Days per week:  "None     Minutes per session: None     Stress: None   Relationships     Social connections     Talks on phone: None     Gets together: None     Attends Orthodox service: None     Active member of club or organization: None     Attends meetings of clubs or organizations: None     Relationship status: None     Intimate partner violence     Fear of current or ex partner: None     Emotionally abused: None     Physically abused: None     Forced sexual activity: None   Other Topics Concern     Parent/sibling w/ CABG, MI or angioplasty before 65F 55M? No   Social History Narrative     None       Review of Systems:  Skin:  Negative     Eyes:  Negative    ENT:  Negative    Respiratory:  Negative    Cardiovascular:  Negative    Gastroenterology: Negative    Genitourinary:  Negative    Musculoskeletal:  Negative    Neurologic:  Negative    Psychiatric:  Negative    Heme/Lymph/Imm:  Negative    Endocrine:  Negative      Physical Exam:  Vitals: /77   Pulse 66   Ht 1.626 m (5' 4\")   Wt 83.5 kg (184 lb)   BMI 31.58 kg/m      Constitutional:  cooperative, alert and oriented, well developed, well nourished, in no acute distress obese      Skin:  warm and dry to the touch        Head:  normocephalic, no masses or lesions        Eyes:  pupils equal and round        ENT:  no pallor or cyanosis        Neck:  carotid pulses are full and equal bilaterally        Chest:  clear to auscultation        Cardiac: regular rhythm                  Abdomen:  abdomen soft obese      Vascular: pulses full and equal     right radial artery;2+             left radial artery;2+                  Extremities and Back:  no edema;no deformities, clubbing, cyanosis, erythema observed        Neurological:  no gross motor deficits          Recent Lab Results:  LIPID RESULTS:  Lab Results   Component Value Date    CHOL 223 (H) 09/07/2018    HDL 52 09/07/2018     (H) 09/07/2018    TRIG 303 (H) 09/07/2018       LIVER ENZYME RESULTS:  Lab Results "   Component Value Date    AST 43 09/07/2018    ALT 60 (H) 09/07/2018       CBC RESULTS:  Lab Results   Component Value Date    WBC 7.3 07/27/2018    RBC 4.63 07/27/2018    HGB 13.9 07/27/2018    HCT 42.9 07/27/2018    MCV 93 07/27/2018    MCH 30.0 07/27/2018    MCHC 32.4 07/27/2018    RDW 14.5 07/27/2018     07/27/2018       BMP RESULTS:  Lab Results   Component Value Date     04/26/2019    POTASSIUM 3.9 04/26/2019    CHLORIDE 103 04/26/2019    CO2 25 04/26/2019    ANIONGAP 12.9 04/26/2019     (H) 04/26/2019    BUN 16 04/26/2019    CR 1.20 04/26/2019    GFRESTIMATED 46 (L) 04/26/2019    GFRESTBLACK 56 (L) 04/26/2019    ANKUSH 9.7 04/26/2019            CC  Quinn Quezada MD  [id number]  [id number]  [place] [id number]

## 2020-11-10 ENCOUNTER — OFFICE VISIT (OUTPATIENT)
Dept: CARDIOLOGY | Facility: CLINIC | Age: 60
End: 2020-11-10

## 2020-11-10 VITALS
DIASTOLIC BLOOD PRESSURE: 77 MMHG | SYSTOLIC BLOOD PRESSURE: 123 MMHG | WEIGHT: 184 LBS | HEART RATE: 66 BPM | BODY MASS INDEX: 31.41 KG/M2 | HEIGHT: 64 IN

## 2020-11-10 DIAGNOSIS — I50.23 ACUTE ON CHRONIC SYSTOLIC CONGESTIVE HEART FAILURE (H): ICD-10-CM

## 2020-11-10 DIAGNOSIS — I48.0 PAROXYSMAL ATRIAL FIBRILLATION (H): Primary | ICD-10-CM

## 2020-11-10 DIAGNOSIS — R60.9 EDEMA, UNSPECIFIED TYPE: ICD-10-CM

## 2020-11-10 DIAGNOSIS — R63.5 WEIGHT GAIN: ICD-10-CM

## 2020-11-10 DIAGNOSIS — N18.31 STAGE 3A CHRONIC KIDNEY DISEASE (H): ICD-10-CM

## 2020-11-10 PROBLEM — N18.30 CHRONIC KIDNEY DISEASE, STAGE 3 (H): Status: ACTIVE | Noted: 2020-11-10

## 2020-11-10 PROCEDURE — 93000 ELECTROCARDIOGRAM COMPLETE: CPT | Performed by: NURSE PRACTITIONER

## 2020-11-10 PROCEDURE — 99214 OFFICE O/P EST MOD 30 MIN: CPT | Mod: 25 | Performed by: NURSE PRACTITIONER

## 2020-11-10 RX ORDER — FUROSEMIDE 20 MG
20 TABLET ORAL DAILY
Qty: 90 TABLET | Refills: 3 | Status: SHIPPED | OUTPATIENT
Start: 2020-11-10 | End: 2020-11-17

## 2020-11-10 RX ORDER — METOPROLOL SUCCINATE 25 MG/1
25 TABLET, EXTENDED RELEASE ORAL DAILY
Qty: 90 TABLET | Refills: 3 | Status: SHIPPED | OUTPATIENT
Start: 2020-11-10 | End: 2021-11-29

## 2020-11-10 RX ORDER — FLECAINIDE ACETATE 150 MG/1
150 TABLET ORAL EVERY 12 HOURS
Qty: 180 TABLET | Refills: 3 | Status: SHIPPED | OUTPATIENT
Start: 2020-11-10 | End: 2021-11-29

## 2020-11-10 ASSESSMENT — MIFFLIN-ST. JEOR: SCORE: 1394.62

## 2020-11-10 NOTE — LETTER
11/10/2020    Sona Cueto PA-C  [address]  [facility] [address]    RE: Luz Elena Caballero       Dear Colleague,    I had the pleasure of seeing Luz Elena Caballero in the [facility].    HPI:  Luz Elena Caballero is a 60 year old female who presents for annual cardiology visit.  She is a patient of .  Her medical history includes     1. atrial fibrillation   2. HFrEF  3. Obesity    In 7/2018, pt was dx with atrial fibrillation with reduced ejection fraction of 45%.   A RANDY was done for cardioversion showed an EF of around 30%-35%.  She was also found to have severe mitral and moderate to severe tricuspid regurgitation and hyperthyroidism.   She had a recurrence of atrial fibrillation and was started on flecainide.      ECHO (11/2018)   Left ventricular systolic function is normal.  The visual ejection fraction is estimated at 55-60%.  The right ventricular systolic function is normal.  There is mild (1+) mitral regurgitation.    In 2019, he met with Dr. Quezada and was on flecainide 150 mg twice daily with no recurrence of atrial fibrillation ad on no OAC      Today she reports no recurrence of atrial fibrillation.  She is taking furosemide every other day as this helps with overall edema - today she is euvolemic.   She denies sleep apnea symptoms.   She walks 1 miles per day and denies chest pain or pressure, dizziness, syncope, angina, dyspnea at rest or with exertion, palpitations, orthopnea, PND, or edema.  States takes medications as prescribed.      ASSESSMENT AND PLAN    Persistent Atrial fibrillation    Pt was dx with atrial fibrillation and tachycardia induced cardiomyopathy.  Her HF improved with rhythm control.     For rhythm control she is taking flecainide 150 mg twice daily and metoprolol XL 25 mg daily with no occurrence of atrial fibrillation.  ECG revealed sinus rhythm with ventricular rate of 67 bpm,  ms  This is consist with  previous ECG's.      For anticoagulation for CHADS VASC 1 (female) she is currently not taking OAC.        HFrEF    Noted to be 45% in 7/2018    Resolved in 11/2018 ECHO     Continues to take furosemide 20 mg every other day    BMP today    Plan:    TSH with reflex to free T4 due to weight gain    BMP now due to lasix    Continue current medications - can consider going to lasix every 3rd day to be decided when labs return    Follow up with Dr. Quezada in 1 year with ECHO and labs prior.         Patient expresses understanding and agreement with the plan.     I appreciate the chance to help with Luz Elena Caballero Please let me know if you have any questions or concerns.    Jolanta Painter, APRN, CNP    This note was completed in part using Dragon voice recognition software. Although reviewed after completion, some word and grammatical errors may occur.    Orders Placed This Encounter   Procedures     Basic metabolic panel     Basic metabolic panel     TSH with free T4 reflex     Follow-Up with Electrophysiologist     EKG 12-lead complete w/read - Clinics (performed today)     Echocardiogram Complete     Orders Placed This Encounter   Medications     omeprazole (PRILOSEC) 20 MG DR capsule     Sig: Take 20 mg by mouth daily     flecainide (TAMBOCOR) 150 MG tablet     Sig: Take 1 tablet (150 mg) by mouth every 12 hours     Dispense:  180 tablet     Refill:  3     metoprolol succinate ER (TOPROL XL) 25 MG 24 hr tablet     Sig: Take 1 tablet (25 mg) by mouth daily     Dispense:  90 tablet     Refill:  3     furosemide (LASIX) 20 MG tablet     Sig: Take 1 tablet (20 mg) by mouth daily Or as directed by cardiology     Dispense:  90 tablet     Refill:  3     Medications Discontinued During This Encounter   Medication Reason     ranitidine (ZANTAC) 150 MG tablet      metoprolol succinate ER (TOPROL XL) 25 MG 24 hr tablet      furosemide (LASIX) 20 MG tablet      flecainide (TAMBOCOR) 150 MG tablet          Encounter Diagnoses    Name Primary?     Acute on chronic systolic congestive heart failure (H)      Stage 3a chronic kidney disease      Edema, unspecified type      Paroxysmal atrial fibrillation (H) Yes     Weight gain        CURRENT MEDICATIONS:  Current Outpatient Medications   Medication Sig Dispense Refill     albuterol (PROAIR HFA/PROVENTIL HFA/VENTOLIN HFA) 108 (90 Base) MCG/ACT Inhaler Inhale 2 puffs into the lungs every 6 hours as needed for shortness of breath / dyspnea or wheezing 3 Inhaler 1     bismuth subsalicylate (PEPTO BISMOL) 262 MG/15ML suspension Take 15 mLs by mouth every 6 hours as needed for indigestion       flecainide (TAMBOCOR) 150 MG tablet Take 1 tablet (150 mg) by mouth every 12 hours 180 tablet 3     furosemide (LASIX) 20 MG tablet Take 1 tablet (20 mg) by mouth daily Or as directed by cardiology 90 tablet 3     metoprolol succinate ER (TOPROL XL) 25 MG 24 hr tablet Take 1 tablet (25 mg) by mouth daily 90 tablet 3     omeprazole (PRILOSEC) 20 MG DR capsule Take 20 mg by mouth daily         ALLERGIES   No Known Allergies    PAST MEDICAL HISTORY:  Past Medical History:   Diagnosis Date     Hyperthyroidism      Persistent atrial fibrillation (H)        PAST SURGICAL HISTORY:  Past Surgical History:   Procedure Laterality Date     CHOLECYSTECTOMY         FAMILY HISTORY:  Family History   Problem Relation Age of Onset     Lung Cancer Mother      Lung Cancer Father      Cerebrovascular Disease Brother      Family History Negative Brother      Family History Negative Brother        SOCIAL HISTORY:  Social History     Socioeconomic History     Marital status:      Spouse name: None     Number of children: None     Years of education: None     Highest education level: None   Occupational History     None   Social Needs     Financial resource strain: None     Food insecurity     Worry: None     Inability: None     Transportation needs     Medical: None     Non-medical: None   Tobacco Use     Smoking status:  "Never Smoker     Smokeless tobacco: Never Used   Substance and Sexual Activity     Alcohol use: No     Drug use: No     Sexual activity: Yes     Partners: Male   Lifestyle     Physical activity     Days per week: None     Minutes per session: None     Stress: None   Relationships     Social connections     Talks on phone: None     Gets together: None     Attends [employer] service: None     Active member of club or organization: None     Attends meetings of clubs or organizations: None     Relationship status: None     Intimate partner violence     Fear of current or ex partner: None     Emotionally abused: None     Physically abused: None     Forced sexual activity: None   Other Topics Concern     Parent/sibling w/ CABG, MI or angioplasty before 65F 55M? No   Social History Narrative     None       Review of Systems:  Skin:  Negative     Eyes:  Negative    ENT:  Negative    Respiratory:  Negative    Cardiovascular:  Negative    Gastroenterology: Negative    Genitourinary:  Negative    Musculoskeletal:  Negative    Neurologic:  Negative    Psychiatric:  Negative    Heme/Lymph/Imm:  Negative    Endocrine:  Negative      Physical Exam:  Vitals: /77   Pulse 66   Ht 1.626 m (5' 4\")   Wt 83.5 kg (184 lb)   BMI 31.58 kg/m      Constitutional:  cooperative, alert and oriented, well developed, well nourished, in no acute distress obese      Skin:  warm and dry to the touch        Head:  normocephalic, no masses or lesions        Eyes:  pupils equal and round        ENT:  no pallor or cyanosis        Neck:  carotid pulses are full and equal bilaterally        Chest:  clear to auscultation        Cardiac: regular rhythm                  Abdomen:  abdomen soft obese      Vascular: pulses full and equal     right radial artery;2+             left radial artery;2+                  Extremities and Back:  no edema;no deformities, clubbing, cyanosis, erythema observed        Neurological:  no gross motor deficits    "       Recent Lab Results:  LIPID RESULTS:  Lab Results   Component Value Date    CHOL 223 (H) 09/07/2018    HDL 52 09/07/2018     (H) 09/07/2018    TRIG 303 (H) 09/07/2018       LIVER ENZYME RESULTS:  Lab Results   Component Value Date    AST 43 09/07/2018    ALT 60 (H) 09/07/2018       CBC RESULTS:  Lab Results   Component Value Date    WBC 7.3 07/27/2018    RBC 4.63 07/27/2018    HGB 13.9 07/27/2018    HCT 42.9 07/27/2018    MCV 93 07/27/2018    MCH 30.0 07/27/2018    MCHC 32.4 07/27/2018    RDW 14.5 07/27/2018     07/27/2018       BMP RESULTS:  Lab Results   Component Value Date     04/26/2019    POTASSIUM 3.9 04/26/2019    CHLORIDE 103 04/26/2019    CO2 25 04/26/2019    ANIONGAP 12.9 04/26/2019     (H) 04/26/2019    BUN 16 04/26/2019    CR 1.20 04/26/2019    GFRESTIMATED 46 (L) 04/26/2019    GFRESTBLACK 56 (L) 04/26/2019    ANKUSH 9.7 04/26/2019            CC  Quinn Quezada MD  [id number]  [id number]  [place] [id number]                    Thank you for allowing me to participate in the care of your patient.      Sincerely,     DIMITRI [facility]    cc:   Quinn Quezada MD  [id number]  [id number]  [place] [id number]

## 2020-11-10 NOTE — LETTER
11/10/2020    Sona Cueto PA-C  [address]  [facility] [address]    RE: Luz Elena Caballero       Dear Colleague,    I had the pleasure of seeing Luz Elena Caballero in the [facility].    HPI:  Luz Elena Caballero is a 60 year old female who presents for annual cardiology visit.  She is a patient of .  Her medical history includes     1. atrial fibrillation   2. HFrEF  3. Obesity    In 7/2018, pt was dx with atrial fibrillation with reduced ejection fraction of 45%.   A RANDY was done for cardioversion showed an EF of around 30%-35%.  She was also found to have severe mitral and moderate to severe tricuspid regurgitation and hyperthyroidism.   She had a recurrence of atrial fibrillation and was started on flecainide.      ECHO (11/2018)   Left ventricular systolic function is normal.  The visual ejection fraction is estimated at 55-60%.  The right ventricular systolic function is normal.  There is mild (1+) mitral regurgitation.    In 2019, he met with Dr. Quezada and was on flecainide 150 mg twice daily with no recurrence of atrial fibrillation ad on no OAC      Today she reports no recurrence of atrial fibrillation.  She is taking furosemide every other day as this helps with overall edema - today she is euvolemic.   She denies sleep apnea symptoms.   She walks 1 miles per day and denies chest pain or pressure, dizziness, syncope, angina, dyspnea at rest or with exertion, palpitations, orthopnea, PND, or edema.  States takes medications as prescribed.      ASSESSMENT AND PLAN    Persistent Atrial fibrillation    Pt was dx with atrial fibrillation and tachycardia induced cardiomyopathy.  Her HF improved with rhythm control.     For rhythm control she is taking flecainide 150 mg twice daily and metoprolol XL 25 mg daily with no occurrence of atrial fibrillation.  ECG revealed sinus rhythm with ventricular rate of 67 bpm,  ms  This is consist with  previous ECG's.      For anticoagulation for CHADS VASC 1 (female) she is currently not taking OAC.        HFrEF    Noted to be 45% in 7/2018    Resolved in 11/2018 ECHO     Continues to take furosemide 20 mg every other day    BMP today    Plan:    TSH with reflex to free T4 due to weight gain    BMP now due to lasix    Continue current medications - can consider going to lasix every 3rd day to be decided when labs return    Follow up with Dr. Quezada in 1 year with ECHO and labs prior.         Patient expresses understanding and agreement with the plan.     I appreciate the chance to help with Luz Elena Caballero Please let me know if you have any questions or concerns.    Jolanta Painter, APRN, CNP    This note was completed in part using Dragon voice recognition software. Although reviewed after completion, some word and grammatical errors may occur.    Orders Placed This Encounter   Procedures     Basic metabolic panel     Basic metabolic panel     TSH with free T4 reflex     Follow-Up with Electrophysiologist     EKG 12-lead complete w/read - Clinics (performed today)     Echocardiogram Complete     Orders Placed This Encounter   Medications     omeprazole (PRILOSEC) 20 MG DR capsule     Sig: Take 20 mg by mouth daily     flecainide (TAMBOCOR) 150 MG tablet     Sig: Take 1 tablet (150 mg) by mouth every 12 hours     Dispense:  180 tablet     Refill:  3     metoprolol succinate ER (TOPROL XL) 25 MG 24 hr tablet     Sig: Take 1 tablet (25 mg) by mouth daily     Dispense:  90 tablet     Refill:  3     furosemide (LASIX) 20 MG tablet     Sig: Take 1 tablet (20 mg) by mouth daily Or as directed by cardiology     Dispense:  90 tablet     Refill:  3     Medications Discontinued During This Encounter   Medication Reason     ranitidine (ZANTAC) 150 MG tablet      metoprolol succinate ER (TOPROL XL) 25 MG 24 hr tablet      furosemide (LASIX) 20 MG tablet      flecainide (TAMBOCOR) 150 MG tablet          Encounter Diagnoses    Name Primary?     Acute on chronic systolic congestive heart failure (H)      Stage 3a chronic kidney disease      Edema, unspecified type      Paroxysmal atrial fibrillation (H) Yes     Weight gain        CURRENT MEDICATIONS:  Current Outpatient Medications   Medication Sig Dispense Refill     albuterol (PROAIR HFA/PROVENTIL HFA/VENTOLIN HFA) 108 (90 Base) MCG/ACT Inhaler Inhale 2 puffs into the lungs every 6 hours as needed for shortness of breath / dyspnea or wheezing 3 Inhaler 1     bismuth subsalicylate (PEPTO BISMOL) 262 MG/15ML suspension Take 15 mLs by mouth every 6 hours as needed for indigestion       flecainide (TAMBOCOR) 150 MG tablet Take 1 tablet (150 mg) by mouth every 12 hours 180 tablet 3     furosemide (LASIX) 20 MG tablet Take 1 tablet (20 mg) by mouth daily Or as directed by cardiology 90 tablet 3     metoprolol succinate ER (TOPROL XL) 25 MG 24 hr tablet Take 1 tablet (25 mg) by mouth daily 90 tablet 3     omeprazole (PRILOSEC) 20 MG DR capsule Take 20 mg by mouth daily         ALLERGIES   No Known Allergies    PAST MEDICAL HISTORY:  Past Medical History:   Diagnosis Date     Hyperthyroidism      Persistent atrial fibrillation (H)        PAST SURGICAL HISTORY:  Past Surgical History:   Procedure Laterality Date     CHOLECYSTECTOMY         FAMILY HISTORY:  Family History   Problem Relation Age of Onset     Lung Cancer Mother      Lung Cancer Father      Cerebrovascular Disease Brother      Family History Negative Brother      Family History Negative Brother        SOCIAL HISTORY:  Social History     Socioeconomic History     Marital status:      Spouse name: None     Number of children: None     Years of education: None     Highest education level: None   Occupational History     None   Social Needs     Financial resource strain: None     Food insecurity     Worry: None     Inability: None     Transportation needs     Medical: None     Non-medical: None   Tobacco Use     Smoking status:  "Never Smoker     Smokeless tobacco: Never Used   Substance and Sexual Activity     Alcohol use: No     Drug use: No     Sexual activity: Yes     Partners: Male   Lifestyle     Physical activity     Days per week: None     Minutes per session: None     Stress: None   Relationships     Social connections     Talks on phone: None     Gets together: None     Attends Restorationist service: None     Active member of club or organization: None     Attends meetings of clubs or organizations: None     Relationship status: None     Intimate partner violence     Fear of current or ex partner: None     Emotionally abused: None     Physically abused: None     Forced sexual activity: None   Other Topics Concern     Parent/sibling w/ CABG, MI or angioplasty before 65F 55M? No   Social History Narrative     None       Review of Systems:  Skin:  Negative     Eyes:  Negative    ENT:  Negative    Respiratory:  Negative    Cardiovascular:  Negative    Gastroenterology: Negative    Genitourinary:  Negative    Musculoskeletal:  Negative    Neurologic:  Negative    Psychiatric:  Negative    Heme/Lymph/Imm:  Negative    Endocrine:  Negative      Physical Exam:  Vitals: /77   Pulse 66   Ht 1.626 m (5' 4\")   Wt 83.5 kg (184 lb)   BMI 31.58 kg/m      Constitutional:  cooperative, alert and oriented, well developed, well nourished, in no acute distress obese      Skin:  warm and dry to the touch        Head:  normocephalic, no masses or lesions        Eyes:  pupils equal and round        ENT:  no pallor or cyanosis        Neck:  carotid pulses are full and equal bilaterally        Chest:  clear to auscultation        Cardiac: regular rhythm                  Abdomen:  abdomen soft obese      Vascular: pulses full and equal     right radial artery;2+             left radial artery;2+                  Extremities and Back:  no edema;no deformities, clubbing, cyanosis, erythema observed        Neurological:  no gross motor deficits    "       Recent Lab Results:  LIPID RESULTS:  Lab Results   Component Value Date    CHOL 223 (H) 09/07/2018    HDL 52 09/07/2018     (H) 09/07/2018    TRIG 303 (H) 09/07/2018       LIVER ENZYME RESULTS:  Lab Results   Component Value Date    AST 43 09/07/2018    ALT 60 (H) 09/07/2018       CBC RESULTS:  Lab Results   Component Value Date    WBC 7.3 07/27/2018    RBC 4.63 07/27/2018    HGB 13.9 07/27/2018    HCT 42.9 07/27/2018    MCV 93 07/27/2018    MCH 30.0 07/27/2018    MCHC 32.4 07/27/2018    RDW 14.5 07/27/2018     07/27/2018       BMP RESULTS:  Lab Results   Component Value Date     04/26/2019    POTASSIUM 3.9 04/26/2019    CHLORIDE 103 04/26/2019    CO2 25 04/26/2019    ANIONGAP 12.9 04/26/2019     (H) 04/26/2019    BUN 16 04/26/2019    CR 1.20 04/26/2019    GFRESTIMATED 46 (L) 04/26/2019    GFRESTBLACK 56 (L) 04/26/2019    ANKUSH 9.7 04/26/2019          Thank you for allowing me to participate in the care of your patient.    Sincerely,     DIMITRI Covington [facility]

## 2020-11-10 NOTE — PATIENT INSTRUCTIONS
TSH with reflex to free T4 due to weight gain  electrolytes now due to lasix  We will call you with results  Continue current medications - can consider going to lasix every 3rd day to be decided when labs return  Try to lose weight  Follow up with Dr. Quezada in 1 year with ECHO and labs prior.

## 2020-11-16 DIAGNOSIS — I48.0 PAROXYSMAL ATRIAL FIBRILLATION (H): ICD-10-CM

## 2020-11-16 DIAGNOSIS — N18.31 STAGE 3A CHRONIC KIDNEY DISEASE (H): ICD-10-CM

## 2020-11-16 DIAGNOSIS — R63.5 WEIGHT GAIN: ICD-10-CM

## 2020-11-16 LAB
ANION GAP SERPL CALCULATED.3IONS-SCNC: 6 MMOL/L (ref 3–14)
BUN SERPL-MCNC: 16 MG/DL (ref 7–30)
CALCIUM SERPL-MCNC: 9 MG/DL (ref 8.5–10.1)
CHLORIDE SERPL-SCNC: 105 MMOL/L (ref 94–109)
CO2 SERPL-SCNC: 28 MMOL/L (ref 20–32)
CREAT SERPL-MCNC: 0.88 MG/DL (ref 0.52–1.04)
GFR SERPL CREATININE-BSD FRML MDRD: 72 ML/MIN/{1.73_M2}
GLUCOSE SERPL-MCNC: 130 MG/DL (ref 70–99)
POTASSIUM SERPL-SCNC: 3.8 MMOL/L (ref 3.4–5.3)
SODIUM SERPL-SCNC: 139 MMOL/L (ref 133–144)
TSH SERPL DL<=0.005 MIU/L-ACNC: 1.97 MU/L (ref 0.4–4)

## 2020-11-16 PROCEDURE — 36415 COLL VENOUS BLD VENIPUNCTURE: CPT | Performed by: NURSE PRACTITIONER

## 2020-11-16 PROCEDURE — 84443 ASSAY THYROID STIM HORMONE: CPT | Performed by: NURSE PRACTITIONER

## 2020-11-16 PROCEDURE — 80048 BASIC METABOLIC PNL TOTAL CA: CPT | Performed by: NURSE PRACTITIONER

## 2020-11-17 ENCOUNTER — TELEPHONE (OUTPATIENT)
Dept: CARDIOLOGY | Facility: CLINIC | Age: 60
End: 2020-11-17

## 2020-11-17 DIAGNOSIS — I50.23 ACUTE ON CHRONIC SYSTOLIC CONGESTIVE HEART FAILURE (H): ICD-10-CM

## 2020-11-17 RX ORDER — FUROSEMIDE 20 MG
20 TABLET ORAL
Qty: 72 TABLET | Refills: 3 | COMMUNITY
Start: 2020-11-19 | End: 2021-11-29

## 2020-11-17 NOTE — TELEPHONE ENCOUNTER
Called pt regarding her labs.  She will start taking lasix 20 mg every on Monday and Thursday morning    DIMITRI Covington CNP on 11/17/2020 at 10:47 AM

## 2021-04-07 ENCOUNTER — IMMUNIZATION (OUTPATIENT)
Dept: NURSING | Facility: CLINIC | Age: 61
End: 2021-04-07
Payer: OTHER GOVERNMENT

## 2021-04-07 PROCEDURE — 0001A PR COVID VAC PFIZER DIL RECON 30 MCG/0.3 ML IM: CPT

## 2021-04-07 PROCEDURE — 91300 PR COVID VAC PFIZER DIL RECON 30 MCG/0.3 ML IM: CPT

## 2021-04-28 ENCOUNTER — IMMUNIZATION (OUTPATIENT)
Dept: NURSING | Facility: CLINIC | Age: 61
End: 2021-04-28
Attending: INTERNAL MEDICINE
Payer: OTHER GOVERNMENT

## 2021-04-28 PROCEDURE — 91300 PR COVID VAC PFIZER DIL RECON 30 MCG/0.3 ML IM: CPT

## 2021-04-28 PROCEDURE — 0002A PR COVID VAC PFIZER DIL RECON 30 MCG/0.3 ML IM: CPT

## 2021-11-29 DIAGNOSIS — I48.0 PAROXYSMAL ATRIAL FIBRILLATION (H): Primary | ICD-10-CM

## 2021-11-29 DIAGNOSIS — I50.23 ACUTE ON CHRONIC SYSTOLIC CONGESTIVE HEART FAILURE (H): ICD-10-CM

## 2021-11-29 RX ORDER — METOPROLOL SUCCINATE 25 MG/1
25 TABLET, EXTENDED RELEASE ORAL DAILY
Qty: 90 TABLET | Refills: 0 | Status: SHIPPED | OUTPATIENT
Start: 2021-11-29 | End: 2022-02-28

## 2021-11-29 RX ORDER — FUROSEMIDE 20 MG
20 TABLET ORAL
Qty: 26 TABLET | Refills: 0 | Status: SHIPPED | OUTPATIENT
Start: 2021-11-29 | End: 2022-03-01

## 2021-11-29 RX ORDER — FLECAINIDE ACETATE 150 MG/1
150 TABLET ORAL EVERY 12 HOURS
Qty: 180 TABLET | Refills: 0 | Status: SHIPPED | OUTPATIENT
Start: 2021-11-29 | End: 2021-12-21

## 2021-12-14 ENCOUNTER — TELEPHONE (OUTPATIENT)
Dept: CARDIOLOGY | Facility: CLINIC | Age: 61
End: 2021-12-14

## 2021-12-14 DIAGNOSIS — I48.0 PAROXYSMAL ATRIAL FIBRILLATION (H): Primary | ICD-10-CM

## 2021-12-15 ENCOUNTER — HOSPITAL ENCOUNTER (OUTPATIENT)
Dept: CARDIOLOGY | Facility: CLINIC | Age: 61
Discharge: HOME | End: 2021-12-15
Attending: NURSE PRACTITIONER | Admitting: NURSE PRACTITIONER

## 2021-12-15 ENCOUNTER — LAB (OUTPATIENT)
Dept: LAB | Facility: CLINIC | Age: 61
End: 2021-12-15

## 2021-12-15 DIAGNOSIS — I48.0 PAROXYSMAL ATRIAL FIBRILLATION (H): ICD-10-CM

## 2021-12-15 LAB
ANION GAP SERPL CALCULATED.3IONS-SCNC: 4 MMOL/L (ref 3–14)
BUN SERPL-MCNC: 17 MG/DL (ref 7–30)
CALCIUM SERPL-MCNC: 9.4 MG/DL (ref 8.5–10.1)
CHLORIDE BLD-SCNC: 107 MMOL/L (ref 94–109)
CO2 SERPL-SCNC: 29 MMOL/L (ref 20–32)
CREAT SERPL-MCNC: 0.85 MG/DL (ref 0.52–1.04)
GFR SERPL CREATININE-BSD FRML MDRD: 75 ML/MIN/1.73M2
GLUCOSE BLD-MCNC: 77 MG/DL (ref 70–99)
LVEF ECHO: NORMAL
POTASSIUM BLD-SCNC: 4.2 MMOL/L (ref 3.4–5.3)
SODIUM SERPL-SCNC: 140 MMOL/L (ref 133–144)

## 2021-12-15 PROCEDURE — 93306 TTE W/DOPPLER COMPLETE: CPT

## 2021-12-15 PROCEDURE — 93306 TTE W/DOPPLER COMPLETE: CPT | Mod: 26 | Performed by: INTERNAL MEDICINE

## 2021-12-15 PROCEDURE — 80048 BASIC METABOLIC PNL TOTAL CA: CPT | Performed by: INTERNAL MEDICINE

## 2021-12-15 PROCEDURE — 36415 COLL VENOUS BLD VENIPUNCTURE: CPT | Performed by: INTERNAL MEDICINE

## 2021-12-21 ENCOUNTER — OFFICE VISIT (OUTPATIENT)
Dept: CARDIOLOGY | Facility: CLINIC | Age: 61
End: 2021-12-21

## 2021-12-21 VITALS
DIASTOLIC BLOOD PRESSURE: 75 MMHG | HEIGHT: 64 IN | SYSTOLIC BLOOD PRESSURE: 122 MMHG | WEIGHT: 182 LBS | BODY MASS INDEX: 31.07 KG/M2 | HEART RATE: 81 BPM | OXYGEN SATURATION: 98 %

## 2021-12-21 DIAGNOSIS — I48.0 PAROXYSMAL ATRIAL FIBRILLATION (H): ICD-10-CM

## 2021-12-21 DIAGNOSIS — I48.91 ATRIAL FIBRILLATION WITH RVR (H): ICD-10-CM

## 2021-12-21 PROCEDURE — 93000 ELECTROCARDIOGRAM COMPLETE: CPT | Performed by: INTERNAL MEDICINE

## 2021-12-21 PROCEDURE — 99214 OFFICE O/P EST MOD 30 MIN: CPT | Performed by: INTERNAL MEDICINE

## 2021-12-21 RX ORDER — FLECAINIDE ACETATE 150 MG/1
150 TABLET ORAL EVERY 12 HOURS
Qty: 180 TABLET | Refills: 3 | Status: SHIPPED | OUTPATIENT
Start: 2021-12-21 | End: 2023-01-16

## 2021-12-21 ASSESSMENT — MIFFLIN-ST. JEOR: SCORE: 1380.55

## 2021-12-21 NOTE — PROGRESS NOTES
HPI and Plan:   See dictation        Orders Placed This Encounter   Procedures     Follow-Up with Cardiac Advanced Practice Provider     EKG 12-lead complete w/read (Future)- to be scheduled       Orders Placed This Encounter   Medications     flecainide (TAMBOCOR) 150 MG tablet     Sig: Take 1 tablet (150 mg) by mouth every 12 hours     Dispense:  180 tablet     Refill:  3       Medications Discontinued During This Encounter   Medication Reason     flecainide (TAMBOCOR) 150 MG tablet Reorder         Encounter Diagnoses   Name Primary?     Atrial fibrillation with RVR (H)      Paroxysmal atrial fibrillation (H)        CURRENT MEDICATIONS:  Current Outpatient Medications   Medication Sig Dispense Refill     albuterol (PROAIR HFA/PROVENTIL HFA/VENTOLIN HFA) 108 (90 Base) MCG/ACT Inhaler Inhale 2 puffs into the lungs every 6 hours as needed for shortness of breath / dyspnea or wheezing 3 Inhaler 1     bismuth subsalicylate (PEPTO BISMOL) 262 MG/15ML suspension Take 15 mLs by mouth every 6 hours as needed for indigestion       flecainide (TAMBOCOR) 150 MG tablet Take 1 tablet (150 mg) by mouth every 12 hours 180 tablet 3     furosemide (LASIX) 20 MG tablet Take 1 tablet (20 mg) by mouth in the morning, Mon and Thur Or as directed by cardiology 26 tablet 0     metoprolol succinate ER (TOPROL XL) 25 MG 24 hr tablet Take 1 tablet (25 mg) by mouth daily 90 tablet 0     omeprazole (PRILOSEC) 20 MG DR capsule Take 20 mg by mouth daily         ALLERGIES   No Known Allergies    PAST MEDICAL HISTORY:  Past Medical History:   Diagnosis Date     Hyperthyroidism      Persistent atrial fibrillation (H)        PAST SURGICAL HISTORY:  Past Surgical History:   Procedure Laterality Date     CHOLECYSTECTOMY         FAMILY HISTORY:  Family History   Problem Relation Age of Onset     Lung Cancer Mother      Lung Cancer Father      Cerebrovascular Disease Brother      Family History Negative Brother      Family History Negative Brother   "      SOCIAL HISTORY:  Social History     Socioeconomic History     Marital status:      Spouse name: None     Number of children: None     Years of education: None     Highest education level: None   Occupational History     None   Tobacco Use     Smoking status: Never Smoker     Smokeless tobacco: Never Used   Substance and Sexual Activity     Alcohol use: No     Drug use: No     Sexual activity: Yes     Partners: Male   Other Topics Concern     Parent/sibling w/ CABG, MI or angioplasty before 65F 55M? No   Social History Narrative     None     Social Determinants of Health     Financial Resource Strain: Not on file   Food Insecurity: Not on file   Transportation Needs: Not on file   Physical Activity: Not on file   Stress: Not on file   Social Connections: Not on file   Intimate Partner Violence: Not on file   Housing Stability: Not on file       Review of Systems:  Skin:  Negative       Eyes:  Negative      ENT:  Negative      Respiratory:  Negative       Cardiovascular:  Negative      Gastroenterology: Negative      Genitourinary:  Negative      Musculoskeletal:  Negative      Neurologic:  Negative      Psychiatric:  Negative      Heme/Lymph/Imm:  Negative      Endocrine:  Negative        Physical Exam:  Vitals: /75   Pulse 81   Ht 1.626 m (5' 4\")   Wt 82.6 kg (182 lb)   SpO2 98%   BMI 31.24 kg/m      Constitutional:  cooperative, alert and oriented, well developed, well nourished, in no acute distress obese      Skin:  warm and dry to the touch          Head:  normocephalic, no masses or lesions        Eyes:  pupils equal and round        Lymph:No Cervical lymphadenopathy present     ENT:  no pallor or cyanosis        Neck:  carotid pulses are full and equal bilaterally        Respiratory:  clear to auscultation         Cardiac: regular rhythm                pulses full and equal     right radial artery;2+             left radial artery;2+                    GI:  abdomen soft obese  "     Extremities and Muscular Skeletal:  no edema;no deformities, clubbing, cyanosis, erythema observed              Neurological:  no gross motor deficits        Psych:  Alert and Oriented x 3        CC  No referring provider defined for this encounter.

## 2021-12-21 NOTE — LETTER
12/21/2021    Sona Cueto PA-C  [address]  [facility] [address]    RE: Luz Elena Caballero       Dear Colleague,     I had the pleasure of seeing Luz Elena Caballero in the [facility].  HPI and Plan:   See dictation        Orders Placed This Encounter   Procedures     Follow-Up with Cardiac Advanced Practice Provider     EKG 12-lead complete w/read (Future)- to be scheduled       Orders Placed This Encounter   Medications     flecainide (TAMBOCOR) 150 MG tablet     Sig: Take 1 tablet (150 mg) by mouth every 12 hours     Dispense:  180 tablet     Refill:  3       Medications Discontinued During This Encounter   Medication Reason     flecainide (TAMBOCOR) 150 MG tablet Reorder         Encounter Diagnoses   Name Primary?     Atrial fibrillation with RVR (H)      Paroxysmal atrial fibrillation (H)        CURRENT MEDICATIONS:  Current Outpatient Medications   Medication Sig Dispense Refill     albuterol (PROAIR HFA/PROVENTIL HFA/VENTOLIN HFA) 108 (90 Base) MCG/ACT Inhaler Inhale 2 puffs into the lungs every 6 hours as needed for shortness of breath / dyspnea or wheezing 3 Inhaler 1     bismuth subsalicylate (PEPTO BISMOL) 262 MG/15ML suspension Take 15 mLs by mouth every 6 hours as needed for indigestion       flecainide (TAMBOCOR) 150 MG tablet Take 1 tablet (150 mg) by mouth every 12 hours 180 tablet 3     furosemide (LASIX) 20 MG tablet Take 1 tablet (20 mg) by mouth in the morning, Mon and Thur Or as directed by cardiology 26 tablet 0     metoprolol succinate ER (TOPROL XL) 25 MG 24 hr tablet Take 1 tablet (25 mg) by mouth daily 90 tablet 0     omeprazole (PRILOSEC) 20 MG DR capsule Take 20 mg by mouth daily         ALLERGIES   No Known Allergies    PAST MEDICAL HISTORY:  Past Medical History:   Diagnosis Date     Hyperthyroidism      Persistent atrial fibrillation (H)        PAST SURGICAL HISTORY:  Past Surgical History:   Procedure Laterality Date     CHOLECYSTECTOMY    "      FAMILY HISTORY:  Family History   Problem Relation Age of Onset     Lung Cancer Mother      Lung Cancer Father      Cerebrovascular Disease Brother      Family History Negative Brother      Family History Negative Brother        SOCIAL HISTORY:  Social History     Socioeconomic History     Marital status:      Spouse name: None     Number of children: None     Years of education: None     Highest education level: None   Occupational History     None   Tobacco Use     Smoking status: Never Smoker     Smokeless tobacco: Never Used   Substance and Sexual Activity     Alcohol use: No     Drug use: No     Sexual activity: Yes     Partners: Male   Other Topics Concern     Parent/sibling w/ CABG, MI or angioplasty before 65F 55M? No   Social History Narrative     None     Social Determinants of Health     Financial Resource Strain: Not on file   Food Insecurity: Not on file   Transportation Needs: Not on file   Physical Activity: Not on file   Stress: Not on file   Social Connections: Not on file   Intimate Partner Violence: Not on file   Housing Stability: Not on file       Review of Systems:  Skin:  Negative       Eyes:  Negative      ENT:  Negative      Respiratory:  Negative       Cardiovascular:  Negative      Gastroenterology: Negative      Genitourinary:  Negative      Musculoskeletal:  Negative      Neurologic:  Negative      Psychiatric:  Negative      Heme/Lymph/Imm:  Negative      Endocrine:  Negative        Physical Exam:  Vitals: /75   Pulse 81   Ht 1.626 m (5' 4\")   Wt 82.6 kg (182 lb)   SpO2 98%   BMI 31.24 kg/m      Constitutional:  cooperative, alert and oriented, well developed, well nourished, in no acute distress obese      Skin:  warm and dry to the touch          Head:  normocephalic, no masses or lesions        Eyes:  pupils equal and round        Lymph:No Cervical lymphadenopathy present     ENT:  no pallor or cyanosis        Neck:  carotid pulses are full and equal " bilaterally        Respiratory:  clear to auscultation         Cardiac: regular rhythm                pulses full and equal     right radial artery;2+             left radial artery;2+                    GI:  abdomen soft obese      Extremities and Muscular Skeletal:  no edema;no deformities, clubbing, cyanosis, erythema observed              Neurological:  no gross motor deficits        Psych:  Alert and Oriented x 3        CC  No referring provider defined for this encounter.    Thank you for allowing me to participate in the care of your patient.      Sincerely,     Quinn Quezada MD     [facility]  cc:   No referring provider defined for this encounter.

## 2021-12-21 NOTE — PROGRESS NOTES
Service Date: 2021    HISTORY OF PRESENT ILLNESS:  I saw Ms. Caballero for followup of atrial fibrillation.  She is a 61-year-old [place] lady who had atrial fibrillation and tachycardia-induced cardiomyopathy.  She has been on flecainide 150 mg p.o. b.i.d. after she was confirmed to have a normal LV function.  So far, she has been doing well with no apparent side effects from flecainide.  She has no evidence of recurrent atrial fibrillation.    At the present time, she has no complaints.    PHYSICAL EXAMINATION:    VITAL SIGNS:  Blood pressure was 122/75, heart rate 78 beats per minute, body weight 182 pounds.  HEENT:  The eyes and ENT were unremarkable.  LUNGS:  Clear.  CARDIAC:  Rhythm was regular.  Heart sounds were normal, without murmur.  ABDOMEN:  Moderate obesity.  EXTREMITIES:  There was no pedal edema.    EKG today showed normal sinus rhythm.  She had an echocardiography on 12/15/2021 that showed normal left ventricular ejection fraction.    ASSESSMENT AND RECOMMENDATIONS:  Ms. Caballero is doing well symptomatically.  She is tolerating flecainide well with no evidence of recurrent atrial fibrillation.  I agree for her to continue current dose of flecainide.  She will return for followup in 1 year.    cc:   Sona Cueto PA-C  [facility]  [address]  [place] [address]       Quinn Quezada MD        D: 2021   T: 2021   MT: FEDERICO    Name:     ALEXANDRO CABALLERO  MRN:      [id number]        Account:      [id number]   :      1960           Service Date: 2021       Document: [id number]

## 2022-02-28 ENCOUNTER — TELEPHONE (OUTPATIENT)
Dept: CARDIOLOGY | Facility: CLINIC | Age: 62
End: 2022-02-28

## 2022-02-28 DIAGNOSIS — I50.23 ACUTE ON CHRONIC SYSTOLIC CONGESTIVE HEART FAILURE (H): ICD-10-CM

## 2022-02-28 RX ORDER — METOPROLOL SUCCINATE 25 MG/1
25 TABLET, EXTENDED RELEASE ORAL DAILY
Qty: 90 TABLET | Refills: 3 | Status: SHIPPED | OUTPATIENT
Start: 2022-02-28 | End: 2023-03-01

## 2022-02-28 NOTE — TELEPHONE ENCOUNTER
[facility] Call Center    Phone Message    May a detailed message be left on voicemail: yes     Reason for Call: Medication Refill Request    Has the patient contacted the pharmacy for the refill? Yes   Name of medication being requested: Metoprolol 25 mg  Provider who prescribed the medication: Dr. Quezada  Pharmacy: [facility] in [place]  Date medication is needed: 02/28/2022         Action Taken: Message routed to:  [facility] ([facility] Cardio    Travel Screening: Not Applicable

## 2022-03-01 DIAGNOSIS — I50.23 ACUTE ON CHRONIC SYSTOLIC CONGESTIVE HEART FAILURE (H): ICD-10-CM

## 2022-03-01 RX ORDER — FUROSEMIDE 20 MG
20 TABLET ORAL
Qty: 26 TABLET | Refills: 2 | Status: SHIPPED | OUTPATIENT
Start: 2022-03-03 | End: 2023-10-17

## 2023-01-16 ENCOUNTER — TELEPHONE (OUTPATIENT)
Dept: CARDIOLOGY | Facility: CLINIC | Age: 63
End: 2023-01-16

## 2023-01-16 DIAGNOSIS — I48.0 PAROXYSMAL ATRIAL FIBRILLATION (H): ICD-10-CM

## 2023-01-16 RX ORDER — FLECAINIDE ACETATE 150 MG/1
150 TABLET ORAL EVERY 12 HOURS
Qty: 180 TABLET | Refills: 0 | Status: SHIPPED | OUTPATIENT
Start: 2023-01-16 | End: 2023-03-13

## 2023-03-01 DIAGNOSIS — I50.23 ACUTE ON CHRONIC SYSTOLIC CONGESTIVE HEART FAILURE (H): ICD-10-CM

## 2023-03-01 RX ORDER — METOPROLOL SUCCINATE 25 MG/1
25 TABLET, EXTENDED RELEASE ORAL DAILY
Qty: 90 TABLET | Refills: 0 | Status: SHIPPED | OUTPATIENT
Start: 2023-03-01 | End: 2023-05-31

## 2023-03-13 DIAGNOSIS — I48.0 PAROXYSMAL ATRIAL FIBRILLATION (H): ICD-10-CM

## 2023-03-13 RX ORDER — FLECAINIDE ACETATE 150 MG/1
150 TABLET ORAL EVERY 12 HOURS
Qty: 180 TABLET | Refills: 0 | Status: SHIPPED | OUTPATIENT
Start: 2023-03-13 | End: 2023-07-13

## 2023-05-31 DIAGNOSIS — I50.23 ACUTE ON CHRONIC SYSTOLIC CONGESTIVE HEART FAILURE (H): ICD-10-CM

## 2023-05-31 RX ORDER — METOPROLOL SUCCINATE 25 MG/1
25 TABLET, EXTENDED RELEASE ORAL DAILY
Qty: 90 TABLET | Refills: 0 | Status: SHIPPED | OUTPATIENT
Start: 2023-05-31 | End: 2023-08-21

## 2023-07-13 DIAGNOSIS — I48.0 PAROXYSMAL ATRIAL FIBRILLATION (H): ICD-10-CM

## 2023-07-13 RX ORDER — FLECAINIDE ACETATE 150 MG/1
150 TABLET ORAL EVERY 12 HOURS
Qty: 60 TABLET | Refills: 0 | Status: SHIPPED | OUTPATIENT
Start: 2023-07-13 | End: 2023-08-21

## 2023-08-21 ENCOUNTER — TELEPHONE (OUTPATIENT)
Dept: CARDIOLOGY | Facility: CLINIC | Age: 63
End: 2023-08-21

## 2023-08-21 DIAGNOSIS — I50.23 ACUTE ON CHRONIC SYSTOLIC CONGESTIVE HEART FAILURE (H): ICD-10-CM

## 2023-08-21 DIAGNOSIS — I48.0 PAROXYSMAL ATRIAL FIBRILLATION (H): Primary | ICD-10-CM

## 2023-08-21 RX ORDER — FLECAINIDE ACETATE 150 MG/1
150 TABLET ORAL EVERY 12 HOURS
Qty: 180 TABLET | Refills: 0 | Status: SHIPPED | OUTPATIENT
Start: 2023-08-21 | End: 2023-11-27

## 2023-08-21 RX ORDER — METOPROLOL SUCCINATE 25 MG/1
25 TABLET, EXTENDED RELEASE ORAL DAILY
Qty: 90 TABLET | Refills: 0 | Status: SHIPPED | OUTPATIENT
Start: 2023-08-21 | End: 2023-11-27

## 2023-08-21 NOTE — TELEPHONE ENCOUNTER
8/21/23 Refill for Eliquis and Metoprolol sent as pt set to see Melinda Shah PA[facility]C [facility] 10/17   [facility]

## 2023-08-21 NOTE — TELEPHONE ENCOUNTER
Health Call Center    Phone Message    May a detailed message be left on voicemail: yes     Reason for Call: Medication Refill Request    Has the patient contacted the pharmacy for the refill? Yes   Name of medication being requested: flecainide (TAMBOCOR) 150 MG tablet   Provider who prescribed the medication: Dr. Torres  Pharmacy: [id number] [place], [place] - [address] [facility]     Date medication is needed: 08/21/2023   Patient returning a phone call they received from nurse Jacobsen in regards to refill medication request. Patient is scheduled to see Melinda Shah on 10/17 in [place], and wanting to know if they have to wait until after they are seen to get more medication. Please call patient back to further discuss.    Action Taken: Other: Cardiology    Travel Screening: Not Applicable    Thank you!  [facility]

## 2023-08-21 NOTE — TELEPHONE ENCOUNTER
8/21/23 Refill request recd from [facility]. Pt overdue for OV . Attempted to call pt, reached TISHA DESAI asking her to call scheduling to set up overdue OV  No refill sent at this time as one courtesy refill has already been sent   Message sent to scheduling to call pt   Ev 3 pm

## 2023-10-15 NOTE — PROGRESS NOTES
"[facility]    I had the pleasure of seeing Luz Elena when she came for follow up of AFib.  This 63 year old previously saw Dr. Quezada for her history of:    1.AFib - a/w cardiomyopathy 7/2018, s/p RANDY/DCCV  7/2018. Started on flecainide with repeat DCCV 8/2018.   2.Tachycardia-induced CM - LVEF initially 30-35% at time of RANDY/DCCV 7/2018, along with significant MR and TR. LVEF and valves normal on echo 2021        Last Visit & Interval History:  Dr. Quezada saw her 12/2021 at which time she was doing well without recurrent AFib on flecainide therapy. No changes were made and annual follow-up recommended. I am meeting her today.    Today's Visit:  Luz Elena notes that she's not had much change since she saw Dr. Quezada 12/2021 but feels she's noted increasing abdominal fullness. No orthopnea, PND. No edema but feels that \"belly is bloated.\"  She believes she was told to stop her furosemide 20 mg daily sometime in the last year and thinks her sxs started with that.    Walks 1-2 miles outside during the summer at least every other day without c/o CP, pressure, tightness or SOB. No palpitations associated.    She has noted random episodes of palpitations a/w chest tightness that have occurred a few times since she saw Dr. Quezada.  No exertional sxs. Drinks a lot of caffeinated soda but doesn't notice a correlation. Usually notes only when laying down watching TV.     VITALS:  Vitals: /82   Pulse 73   Ht 1.626 m (5' 4\")   Wt 82.6 kg (182 lb)   SpO2 96%   BMI 31.24 kg/m      Diagnostic Testing:  EKG today, which I overread, showed SR 75 bpm with QRS duration 98 ms on flecainide 150 mg BID and Metoprolol Xl 25 mg daily  Echocardiogram 12/2021 - LVEF 60-65%. Nl RV. No sig valve dz  Echo 7/2018 LVEF 45-50% with mod dilation of RV and mod decreased RVSF. 4+MR, 3-4+TR. RAP >20mmHg.   Component      Latest Ref Rng 11/16/2020  1:43 PM 12/15/2021  1:20 PM   Sodium      133 - 144 mmol/L 139  140    Potassium      " "3.4 - 5.3 mmol/L 3.8  4.2    Chloride      94 - 109 mmol/L 105  107    Carbon Dioxide      20 - 32 mmol/L 28  29    Anion Gap      3 - 14 mmol/L 6  4    Glucose      70 - 99 mg/dL 130 (H)  77    Urea Nitrogen      7 - 30 mg/dL 16  17    Creatinine      0.52 - 1.04 mg/dL 0.88  0.85    GFR Estimate      >60 mL/min/1.73m2 72  75    GFR Estimate If Black      >60 mL/min/1.73_m2 83     Calcium      8.5 - 10.1 mg/dL 9.0  9.4       Component      Latest Ref Rng 9/7/2018  11:30 AM 11/16/2020  1:43 PM   TSH      0.40 - 4.00 mU/L 1.70  1.97            Plan:  Updated echo  Restart Lasix 20 mg daily  BMP, CBC, TSH/T4 and NTpBNP in 1 week after restarting Lasix  See me back to review    Assessment/Plan:    Persistent AFib  Remains on flecainide (started 2018) with good results. She continues to note \"random\" episodes of palpitations but not changing in frequency, duration or intensity.   EKG today with SR on flecainide  CHADSVASc now 1 (sex). No AC    PLAN:  TSH/T4  No monitor ordered today given very occasional episodes of these but will continue to evaluate    Belly \"bloating\"  On exam, lungs are clear and no edema but + JVD and HJR. Belly seems firm    PLAN:  Resume Lasix 20 mg daily  BMP, NTpBNP, CBC and TSH/T4 in 1 week  Updated echo  See me back to review        Melinda Shah PA-C, MSPAS      Orders Placed This Encounter   Procedures    N terminal pro BNP outpatient    CBC with platelets    Basic metabolic panel    TSH with free T4 reflex    Follow-Up with Cardiology MELIA    EKG 12-lead complete w/read - Clinics (performed today)    Echocardiogram Complete     Orders Placed This Encounter   Medications    furosemide (LASIX) 20 MG tablet     Sig: Take 1 tablet (20 mg) by mouth daily     Dispense:  30 tablet     Refill:  5     Medications Discontinued During This Encounter   Medication Reason    furosemide (LASIX) 20 MG tablet Reorder (No AVS)         Encounter Diagnoses   Name Primary?    Paroxysmal atrial fibrillation (H) Yes " "   Acute on chronic systolic congestive heart failure (H)        CURRENT MEDICATIONS:  Current Outpatient Medications   Medication Sig Dispense Refill    bismuth subsalicylate (PEPTO BISMOL) 262 MG/15ML suspension Take 15 mLs by mouth every 6 hours as needed for indigestion      flecainide (TAMBOCOR) 150 MG tablet Take 1 tablet (150 mg) by mouth every 12 hours 180 tablet 0    furosemide (LASIX) 20 MG tablet Take 1 tablet (20 mg) by mouth daily 30 tablet 5    metoprolol succinate ER (TOPROL XL) 25 MG 24 hr tablet Take 1 tablet (25 mg) by mouth daily 90 tablet 0    omeprazole (PRILOSEC) 20 MG DR capsule Take 20 mg by mouth daily      albuterol (PROAIR HFA/PROVENTIL HFA/VENTOLIN HFA) 108 (90 Base) MCG/ACT Inhaler Inhale 2 puffs into the lungs every 6 hours as needed for shortness of breath / dyspnea or wheezing (Patient not taking: Reported on 10/17/2023) 3 Inhaler 1       ALLERGIES   No Known Allergies      Review of Systems:  Skin:        Eyes:       ENT:       Respiratory:  Positive for shortness of breath  Cardiovascular:  Negative for;lightheadedness;dizziness;syncope or near-syncope;fatigue;edema chest pain;Positive for;palpitations  Gastroenterology:      Genitourinary:       Musculoskeletal:       Neurologic:       Psychiatric:       Heme/Lymph/Imm:  Negative    Endocrine:  Negative      Physical Exam:  Vitals: /82   Pulse 73   Ht 1.626 m (5' 4\")   Wt 82.6 kg (182 lb)   SpO2 96%   BMI 31.24 kg/m      Constitutional:  cooperative, alert and oriented, well developed, well nourished, in no acute distress obese      Skin:  warm and dry to the touch        Head:  normocephalic, no masses or lesions        Eyes:  pupils equal and round        ENT:  no pallor or cyanosis        Neck:  carotid pulses are full and equal bilaterally JVP elevated      Chest:  clear to auscultation        Cardiac: regular rhythm                  Abdomen:  abdomen soft obese      Vascular: pulses full and equal     right radial " artery;2+             left radial artery;2+                  Extremities and Back:  no edema;no deformities, clubbing, cyanosis, erythema observed        Neurological:  no gross motor deficits            PAST MEDICAL HISTORY:  Past Medical History:   Diagnosis Date    Hyperthyroidism     Persistent atrial fibrillation (H)        PAST SURGICAL HISTORY:  Past Surgical History:   Procedure Laterality Date    CHOLECYSTECTOMY         FAMILY HISTORY:  Family History   Problem Relation Age of Onset    Lung Cancer Mother     Lung Cancer Father     Cerebrovascular Disease Brother     Family History Negative Brother     Family History Negative Brother        SOCIAL HISTORY:  Social History     Socioeconomic History    Marital status: Patient Declined     Spouse name: None    Number of children: None    Years of education: None    Highest education level: None   Tobacco Use    Smoking status: Never    Smokeless tobacco: Never   Substance and Sexual Activity    Alcohol use: No    Drug use: No    Sexual activity: Yes     Partners: Male   Other Topics Concern    Parent/sibling w/ CABG, MI or angioplasty before 65F 55M? No

## 2023-10-17 ENCOUNTER — OFFICE VISIT (OUTPATIENT)
Dept: CARDIOLOGY | Facility: CLINIC | Age: 63
End: 2023-10-17

## 2023-10-17 VITALS
OXYGEN SATURATION: 96 % | BODY MASS INDEX: 31.07 KG/M2 | HEIGHT: 64 IN | DIASTOLIC BLOOD PRESSURE: 82 MMHG | WEIGHT: 182 LBS | SYSTOLIC BLOOD PRESSURE: 134 MMHG | HEART RATE: 73 BPM

## 2023-10-17 DIAGNOSIS — I50.23 ACUTE ON CHRONIC SYSTOLIC CONGESTIVE HEART FAILURE (H): ICD-10-CM

## 2023-10-17 DIAGNOSIS — I48.0 PAROXYSMAL ATRIAL FIBRILLATION (H): Primary | ICD-10-CM

## 2023-10-17 PROCEDURE — 99214 OFFICE O/P EST MOD 30 MIN: CPT | Performed by: PHYSICIAN ASSISTANT

## 2023-10-17 PROCEDURE — 93000 ELECTROCARDIOGRAM COMPLETE: CPT | Performed by: PHYSICIAN ASSISTANT

## 2023-10-17 RX ORDER — FUROSEMIDE 20 MG
20 TABLET ORAL DAILY
Qty: 30 TABLET | Refills: 5 | Status: SHIPPED | OUTPATIENT
Start: 2023-10-17 | End: 2023-11-10

## 2023-10-17 NOTE — PATIENT INSTRUCTIONS
Luz Elena - it was good to meet you today!    EKG today showed normal rhythm - good news!  Increased bloating since last visit - stopped Lasix      PLAN:  Restart furosemide 20 mg daily in morning  In 1 week get blood work to check kidneys/electrolytes/blood count  Get updated echo  See me back to review    [phone]

## 2023-10-17 NOTE — LETTER
"10/17/2023    Physician No Ref-Primary  No address on file    RE: Luz Elena Caballero       Dear Colleague,     I had the pleasure of seeing Luz Elena Caballero in the [facility].  [facility]    I had the pleasure of seeing Luz Elena when she came for follow up of AFib.  This 63 year old previously saw Dr. Quezada for her history of:    1.AFib - a/w cardiomyopathy 7/2018, s/p RANDY/DCCV  7/2018. Started on flecainide with repeat DCCV 8/2018.   2.Tachycardia-induced CM - LVEF initially 30-35% at time of RANDY/DCCV 7/2018, along with significant MR and TR. LVEF and valves normal on echo 2021        Last Visit & Interval History:  Dr. Quezada saw her 12/2021 at which time she was doing well without recurrent AFib on flecainide therapy. No changes were made and annual follow-up recommended. I am meeting her today.    Today's Visit:  Luz Elena notes that she's not had much change since she saw Dr. Quezada 12/2021 but feels she's noted increasing abdominal fullness. No orthopnea, PND. No edema but feels that \"belly is bloated.\"  She believes she was told to stop her furosemide 20 mg daily sometime in the last year and thinks her sxs started with that.    Walks 1-2 miles outside during the summer at least every other day without c/o CP, pressure, tightness or SOB. No palpitations associated.    She has noted random episodes of palpitations a/w chest tightness that have occurred a few times since she saw Dr. Quezada.  No exertional sxs. Drinks a lot of caffeinated soda but doesn't notice a correlation. Usually notes only when laying down watching TV.     VITALS:  Vitals: /82   Pulse 73   Ht 1.626 m (5' 4\")   Wt 82.6 kg (182 lb)   SpO2 96%   BMI 31.24 kg/m      Diagnostic Testing:  EKG today, which I overread, showed SR 75 bpm with QRS duration 98 ms on flecainide 150 mg BID and Metoprolol Xl 25 mg daily  Echocardiogram 12/2021 - LVEF 60-65%. Nl RV. No sig valve dz  Echo 7/2018 LVEF 45-50% " "with mod dilation of RV and mod decreased RVSF. 4+MR, 3-4+TR. RAP >20mmHg.   Component      Latest Ref Rng 11/16/2020  1:43 PM 12/15/2021  1:20 PM   Sodium      133 - 144 mmol/L 139  140    Potassium      3.4 - 5.3 mmol/L 3.8  4.2    Chloride      94 - 109 mmol/L 105  107    Carbon Dioxide      20 - 32 mmol/L 28  29    Anion Gap      3 - 14 mmol/L 6  4    Glucose      70 - 99 mg/dL 130 (H)  77    Urea Nitrogen      7 - 30 mg/dL 16  17    Creatinine      0.52 - 1.04 mg/dL 0.88  0.85    GFR Estimate      >60 mL/min/1.73m2 72  75    GFR Estimate If Black      >60 mL/min/1.73_m2 83     Calcium      8.5 - 10.1 mg/dL 9.0  9.4       Component      Latest Ref Rng 9/7/2018  11:30 AM 11/16/2020  1:43 PM   TSH      0.40 - 4.00 mU/L 1.70  1.97            Plan:  Updated echo  Restart Lasix 20 mg daily  BMP, CBC, TSH/T4 and NTpBNP in 1 week after restarting Lasix  See me back to review    Assessment/Plan:    Persistent AFib  Remains on flecainide (started 2018) with good results. She continues to note \"random\" episodes of palpitations but not changing in frequency, duration or intensity.   EKG today with SR on flecainide  CHADSVASc now 1 (sex). No AC    PLAN:  TSH/T4  No monitor ordered today given very occasional episodes of these but will continue to evaluate    Belly \"bloating\"  On exam, lungs are clear and no edema but + JVD and HJR. Belly seems firm    PLAN:  Resume Lasix 20 mg daily  BMP, NTpBNP, CBC and TSH/T4 in 1 week  Updated echo  See me back to review        Melinda Shah PA-C, MSPAS      Orders Placed This Encounter   Procedures    N terminal pro BNP outpatient    CBC with platelets    Basic metabolic panel    TSH with free T4 reflex    Follow-Up with Cardiology MELIA    EKG 12-lead complete w/read - Clinics (performed today)    Echocardiogram Complete     Orders Placed This Encounter   Medications    furosemide (LASIX) 20 MG tablet     Sig: Take 1 tablet (20 mg) by mouth daily     Dispense:  30 tablet     Refill:  5 " "    Medications Discontinued During This Encounter   Medication Reason    furosemide (LASIX) 20 MG tablet Reorder (No AVS)         Encounter Diagnoses   Name Primary?    Paroxysmal atrial fibrillation (H) Yes    Acute on chronic systolic congestive heart failure (H)        CURRENT MEDICATIONS:  Current Outpatient Medications   Medication Sig Dispense Refill    bismuth subsalicylate (PEPTO BISMOL) 262 MG/15ML suspension Take 15 mLs by mouth every 6 hours as needed for indigestion      flecainide (TAMBOCOR) 150 MG tablet Take 1 tablet (150 mg) by mouth every 12 hours 180 tablet 0    furosemide (LASIX) 20 MG tablet Take 1 tablet (20 mg) by mouth daily 30 tablet 5    metoprolol succinate ER (TOPROL XL) 25 MG 24 hr tablet Take 1 tablet (25 mg) by mouth daily 90 tablet 0    omeprazole (PRILOSEC) 20 MG DR capsule Take 20 mg by mouth daily      albuterol (PROAIR HFA/PROVENTIL HFA/VENTOLIN HFA) 108 (90 Base) MCG/ACT Inhaler Inhale 2 puffs into the lungs every 6 hours as needed for shortness of breath / dyspnea or wheezing (Patient not taking: Reported on 10/17/2023) 3 Inhaler 1       ALLERGIES   No Known Allergies      Review of Systems:  Skin:        Eyes:       ENT:       Respiratory:  Positive for shortness of breath  Cardiovascular:  Negative for;lightheadedness;dizziness;syncope or near-syncope;fatigue;edema chest pain;Positive for;palpitations  Gastroenterology:      Genitourinary:       Musculoskeletal:       Neurologic:       Psychiatric:       Heme/Lymph/Imm:  Negative    Endocrine:  Negative      Physical Exam:  Vitals: /82   Pulse 73   Ht 1.626 m (5' 4\")   Wt 82.6 kg (182 lb)   SpO2 96%   BMI 31.24 kg/m      Constitutional:  cooperative, alert and oriented, well developed, well nourished, in no acute distress obese      Skin:  warm and dry to the touch        Head:  normocephalic, no masses or lesions        Eyes:  pupils equal and round        ENT:  no pallor or cyanosis        Neck:  carotid pulses " are full and equal bilaterally JVP elevated      Chest:  clear to auscultation        Cardiac: regular rhythm                  Abdomen:  abdomen soft obese      Vascular: pulses full and equal     right radial artery;2+             left radial artery;2+                  Extremities and Back:  no edema;no deformities, clubbing, cyanosis, erythema observed        Neurological:  no gross motor deficits            PAST MEDICAL HISTORY:  Past Medical History:   Diagnosis Date    Hyperthyroidism     Persistent atrial fibrillation (H)        PAST SURGICAL HISTORY:  Past Surgical History:   Procedure Laterality Date    CHOLECYSTECTOMY         FAMILY HISTORY:  Family History   Problem Relation Age of Onset    Lung Cancer Mother     Lung Cancer Father     Cerebrovascular Disease Brother     Family History Negative Brother     Family History Negative Brother        SOCIAL HISTORY:  Social History     Socioeconomic History    Marital status: Patient Declined     Spouse name: None    Number of children: None    Years of education: None    Highest education level: None   Tobacco Use    Smoking status: Never    Smokeless tobacco: Never   Substance and Sexual Activity    Alcohol use: No    Drug use: No    Sexual activity: Yes     Partners: Male   Other Topics Concern    Parent/sibling w/ CABG, MI or angioplasty before 65F 55M? No               Thank you for allowing me to participate in the care of your patient.      Sincerely,     Hali Shah PA-C     [facility]  cc:   Hali Shah PA-C  [id number]  [place] [address]

## 2023-10-24 ENCOUNTER — TELEPHONE (OUTPATIENT)
Dept: CARDIOLOGY | Facility: CLINIC | Age: 63
End: 2023-10-24

## 2023-10-24 NOTE — TELEPHONE ENCOUNTER
[facility] Call Center    Phone Message    May a detailed message be left on voicemail: yes     Reason for Call: Other: Carolee calling in requesting a call back to clarify why pt has been referred to the [facility] as they do not see anything in the referral as to why. Please return call to advise.      Action Taken: Other: Cardiology    Travel Screening: Not Applicable    Thank you!  [facility]

## 2023-10-24 NOTE — TELEPHONE ENCOUNTER
TISHA for Carolee that Melinda Medely did not send a referral to the [facility]. So asked that a call back to A [facility] nurse to discuss. Leighann

## 2023-10-24 NOTE — TELEPHONE ENCOUNTER
[facility]    Phone Message    May a detailed message be left on voicemail: yes     Reason for Call: Other: Carolee would like to close the fax out and wants to make sure there is nothing more they need to do for the pt, please reach out to Carolee to discuss     Action Taken: Other: Cardio    Travel Screening: Not Applicable

## 2023-10-26 NOTE — TELEPHONE ENCOUNTER
Left a second message to Carolee, made aware that have attempted to reach out to Carolee, that Melinda did not refer patient to the [facility], so not sure what his needed. Leighann

## 2023-10-31 ENCOUNTER — HOSPITAL ENCOUNTER (OUTPATIENT)
Dept: CARDIOLOGY | Facility: CLINIC | Age: 63
Discharge: HOME | End: 2023-10-31
Attending: PHYSICIAN ASSISTANT | Admitting: PHYSICIAN ASSISTANT

## 2023-10-31 ENCOUNTER — LAB (OUTPATIENT)
Dept: LAB | Facility: CLINIC | Age: 63
End: 2023-10-31

## 2023-10-31 DIAGNOSIS — I50.23 ACUTE ON CHRONIC SYSTOLIC CONGESTIVE HEART FAILURE (H): ICD-10-CM

## 2023-10-31 DIAGNOSIS — I48.0 PAROXYSMAL ATRIAL FIBRILLATION (H): ICD-10-CM

## 2023-10-31 LAB
ANION GAP SERPL CALCULATED.3IONS-SCNC: 10 MMOL/L (ref 7–15)
BUN SERPL-MCNC: 15.6 MG/DL (ref 8–23)
CALCIUM SERPL-MCNC: 9.2 MG/DL (ref 8.8–10.2)
CHLORIDE SERPL-SCNC: 103 MMOL/L (ref 98–107)
CREAT SERPL-MCNC: 0.8 MG/DL (ref 0.51–0.95)
DEPRECATED HCO3 PLAS-SCNC: 25 MMOL/L (ref 22–29)
EGFRCR SERPLBLD CKD-EPI 2021: 83 ML/MIN/1.73M2
ERYTHROCYTE [DISTWIDTH] IN BLOOD BY AUTOMATED COUNT: 11.7 % (ref 10–15)
GLUCOSE SERPL-MCNC: 134 MG/DL (ref 70–99)
HCT VFR BLD AUTO: 40.5 % (ref 35–47)
HGB BLD-MCNC: 13.5 G/DL (ref 11.7–15.7)
LVEF ECHO: NORMAL
MCH RBC QN AUTO: 29.9 PG (ref 26.5–33)
MCHC RBC AUTO-ENTMCNC: 33.3 G/DL (ref 31.5–36.5)
MCV RBC AUTO: 90 FL (ref 78–100)
NT-PROBNP SERPL-MCNC: 351 PG/ML (ref 0–900)
PLATELET # BLD AUTO: 258 10E3/UL (ref 150–450)
POTASSIUM SERPL-SCNC: 4 MMOL/L (ref 3.4–5.3)
RBC # BLD AUTO: 4.52 10E6/UL (ref 3.8–5.2)
SODIUM SERPL-SCNC: 138 MMOL/L (ref 135–145)
TSH SERPL DL<=0.005 MIU/L-ACNC: 2.74 UIU/ML (ref 0.3–4.2)
WBC # BLD AUTO: 7.8 10E3/UL (ref 4–11)

## 2023-10-31 PROCEDURE — 83880 ASSAY OF NATRIURETIC PEPTIDE: CPT | Performed by: PHYSICIAN ASSISTANT

## 2023-10-31 PROCEDURE — 93306 TTE W/DOPPLER COMPLETE: CPT

## 2023-10-31 PROCEDURE — 85027 COMPLETE CBC AUTOMATED: CPT | Performed by: PHYSICIAN ASSISTANT

## 2023-10-31 PROCEDURE — 84443 ASSAY THYROID STIM HORMONE: CPT | Performed by: PHYSICIAN ASSISTANT

## 2023-10-31 PROCEDURE — 93306 TTE W/DOPPLER COMPLETE: CPT | Mod: 26 | Performed by: INTERNAL MEDICINE

## 2023-10-31 PROCEDURE — 36415 COLL VENOUS BLD VENIPUNCTURE: CPT | Performed by: PHYSICIAN ASSISTANT

## 2023-10-31 PROCEDURE — 80048 BASIC METABOLIC PNL TOTAL CA: CPT | Performed by: PHYSICIAN ASSISTANT

## 2023-11-08 NOTE — PROGRESS NOTES
"[facility]    I had the pleasure of seeing Luz Elena when she came for follow up of AFib.  This 63 year old previously saw Dr. Quezada for her history of:    1.AFib - a/w cardiomyopathy 7/2018, s/p RANDY/DCCV  7/2018. Started on flecainide with repeat DCCV 8/2018.   2.Tachycardia-induced CM - LVEF initially 30-35% at time of RANDY/DCCV 7/2018, along with significant MR and TR. LVEF and valves normal on echo 2021        Last Visit & Interval History:  Dr. Quezada saw her 12/2021 at which time she was doing well without recurrent AFib on flecainide therapy. No changes were made and annual follow-up recommended. I saw her just last month 10/17 at which itme she'd noted increasing abdominal fullness a/w non-exertional chest tightness.  I recommended Lasix 20 mg daily, updated echo and labs.     Echo 10/31 looked stable, with only mild valvular abnls and nl LVEF. Labs noted below look OK.    Today's Visit:  Since starting Lasix 20 mg daily on 1017, she really feels better, noting she is \"back to normal.\"  She denies any abdominal bloating/discomfort.  She noted about 5-6 pound weight loss at home (weighs daily).    No issues with lightheadedness or dizziness.  Feels she is tolerating this medicine without any issues.    VITALS:  Vitals: /79   Pulse 69   Ht 1.626 m (5' 4\")   Wt 80.7 kg (177 lb 14.4 oz)   SpO2 97%   BMI 30.54 kg/m      Diagnostic Testing:  Echo 10/31/2023 LVEF 60-65%. Nl RV. Trace MR. Trace TR.   EKG 10/2023 showed SR 75 bpm with QRS duration 98 ms on flecainide 150 mg BID and Metoprolol Xl 25 mg daily  Echocardiogram 12/2021 - LVEF 60-65%. Nl RV. No sig valve dz  Echo 7/2018 LVEF 45-50% with mod dilation of RV and mod decreased RVSF. 4+MR, 3-4+TR. RAP >20mmHg.   Component      Latest Ref Rng 11/16/2020  1:43 PM 10/31/2023  8:52 AM   TSH      0.40 - 4.00 mU/L 1.97     TSH      0.30 - 4.20 uIU/mL  2.74      Component      Latest Ref Rn 11/16/2020  1:43 PM 12/15/2021  1:20 PM " "10/31/2023  8:52 AM   Sodium      135 - 145 mmol/L 139  140  138    Potassium      3.4 - 5.3 mmol/L 3.8  4.2  4.0   Chloride      94 - 109 mmol/L 105  107  103   Carbon Dioxide      20 - 32 mmol/L 28  29  25   Anion Gap      7 - 15 mmol/L 6  4  10    Glucose      70 - 99 mg/dL 130 (H)  77  134   Urea Nitrogen      7 - 30 mg/dL 16  17  15.6   Creatinine      0.51 - 0.95 mg/dL 0.88  0.85  0.80    GFR Estimate      >60 mL/min/1.73m2 72  75  83    GFR Estimate If Black      >60 mL/min/ 83      Calcium      8.8 - 10.2 mg/dL 9.0  9.4  9.2       Component      Latest Ref Rng 10/31/2023  8:52 AM   WBC      4.0 - 11.0 10e3/uL 7.8    RBC Count      3.80 - 5.20 10e6/uL 4.52    Hemoglobin      11.7 - 15.7 g/dL 13.5    Hematocrit      35.0 - 47.0 % 40.5    MCV      78 - 100 fL 90    MCH      26.5 - 33.0 pg 29.9    MCHC      31.5 - 36.5 g/dL 33.3    RDW      10.0 - 15.0 % 11.7    Platelet Count      150 - 450 10e3/uL 258        Component      Latest Ref Rng 9/7/2018  11:30 AM 11/5/2018  10:12 AM 10/31/2023  8:52 AM   N-Terminal Pro Bnp      0 - 900 pg/mL 244 (H)  225 (H)  351       Plan:  Decrease furosemide to 20 mg daily PRN for weight gain or increased bloating  See us back Fall 2024 with EKG for flecainide      Assessment/Plan:    Persistent AFib  Remains on flecainide (started 2018) with good results.  Feels like palpitations have improved since I saw her last month  EKG 10/17/2023 showed nl rhythm and intervals on flecainide  CHADSVASc now 1 (sex). No AC    PLAN:  See us back 1 year  No monitor ordered today given very occasional episodes of these but will continue to evaluate    Belly \"bloating\"  On exam 10/17, lungs were clear and no edema but + JVD and HJR. Belly seemed firm  Lasix started 10/17. Blood work 10/31 as above looked fine  Echo 10/31 with nl LVEF and valves  Thinks Lasix really has worked well.  She is down 5 pounds, and belly is softer    PLAN:  Discussed options, including continued daily furosemide therapy " VS changing furosemide to 20 mg daily PRN for weight gain >3#/24 hours or >5#/1 week or increased belly bloating.  She would like to start taking just PRN dosing.         Melinda Shah PA-C, MSPAS      Orders Placed This Encounter   Procedures    Follow-Up with Cardiology MELIA     Orders Placed This Encounter   Medications    furosemide (LASIX) 20 MG tablet     Sig: Take 1 tablet (20 mg) by mouth daily as needed (weight gain, bloating)     Dispense:  30 tablet     Refill:  5     Do not fill - just changed to PRN     Medications Discontinued During This Encounter   Medication Reason    furosemide (LASIX) 20 MG tablet Reorder (No AVS)           Encounter Diagnoses   Name Primary?    Paroxysmal atrial fibrillation (H)     Acute on chronic systolic congestive heart failure (H)          CURRENT MEDICATIONS:  Current Outpatient Medications   Medication Sig Dispense Refill    albuterol (PROAIR HFA/PROVENTIL HFA/VENTOLIN HFA) 108 (90 Base) MCG/ACT Inhaler Inhale 2 puffs into the lungs every 6 hours as needed for shortness of breath / dyspnea or wheezing 3 Inhaler 1    bismuth subsalicylate (PEPTO BISMOL) 262 MG/15ML suspension Take 15 mLs by mouth every 6 hours as needed for indigestion      flecainide (TAMBOCOR) 150 MG tablet Take 1 tablet (150 mg) by mouth every 12 hours 180 tablet 0    furosemide (LASIX) 20 MG tablet Take 1 tablet (20 mg) by mouth daily as needed (weight gain, bloating) 30 tablet 5    metoprolol succinate ER (TOPROL XL) 25 MG 24 hr tablet Take 1 tablet (25 mg) by mouth daily 90 tablet 0    omeprazole (PRILOSEC) 20 MG DR capsule Take 20 mg by mouth daily         ALLERGIES   No Known Allergies      Review of Systems:  Skin:  Negative     Eyes:  Negative    ENT:  Negative    Respiratory:  Negative for shortness of breath;dyspnea on exertion;cough  Cardiovascular:  Negative for;lightheadedness;dizziness;syncope or near-syncope;fatigue;edema;palpitations;chest pain    Gastroenterology: Negative    Genitourinary:  " Negative    Musculoskeletal:  Negative    Neurologic:  Negative    Psychiatric:  Negative    Heme/Lymph/Imm:  Negative    Endocrine:  Negative      Physical Exam:  Vitals: /79   Pulse 69   Ht 1.626 m (5' 4\")   Wt 80.7 kg (177 lb 14.4 oz)   SpO2 97%   BMI 30.54 kg/m      Constitutional:  cooperative, alert and oriented, well developed, well nourished, in no acute distress        Skin:  not assessed this visit        Head:  not assessed this visit        Eyes:  pupils equal and round        ENT:  not assessed this visit        Neck:  not assessed this visit        Chest:  not assessed this visit        Cardiac: regular rhythm                  Abdomen:           Vascular: not assessed this visit                                      Extremities and Back:  no edema;no deformities, clubbing, cyanosis, erythema observed        Neurological:  no gross motor deficits            PAST MEDICAL HISTORY:  Past Medical History:   Diagnosis Date    Hyperthyroidism     Persistent atrial fibrillation (H)        PAST SURGICAL HISTORY:  Past Surgical History:   Procedure Laterality Date    CHOLECYSTECTOMY         FAMILY HISTORY:  Family History   Problem Relation Age of Onset    Lung Cancer Mother     Lung Cancer Father     Cerebrovascular Disease Brother     Family History Negative Brother     Family History Negative Brother        SOCIAL HISTORY:  Social History     Socioeconomic History    Marital status: Patient Declined     Spouse name: None    Number of children: None    Years of education: None    Highest education level: None   Tobacco Use    Smoking status: Never    Smokeless tobacco: Never   Substance and Sexual Activity    Alcohol use: No    Drug use: No    Sexual activity: Yes     Partners: Male   Other Topics Concern    Parent/sibling w/ CABG, MI or angioplasty before 65F 55M? No             "

## 2023-11-10 ENCOUNTER — OFFICE VISIT (OUTPATIENT)
Dept: CARDIOLOGY | Facility: CLINIC | Age: 63
End: 2023-11-10
Attending: PHYSICIAN ASSISTANT

## 2023-11-10 VITALS
HEIGHT: 64 IN | SYSTOLIC BLOOD PRESSURE: 119 MMHG | WEIGHT: 177.9 LBS | HEART RATE: 69 BPM | BODY MASS INDEX: 30.37 KG/M2 | DIASTOLIC BLOOD PRESSURE: 79 MMHG | OXYGEN SATURATION: 97 %

## 2023-11-10 DIAGNOSIS — I50.23 ACUTE ON CHRONIC SYSTOLIC CONGESTIVE HEART FAILURE (H): ICD-10-CM

## 2023-11-10 DIAGNOSIS — I48.0 PAROXYSMAL ATRIAL FIBRILLATION (H): ICD-10-CM

## 2023-11-10 PROCEDURE — 99212 OFFICE O/P EST SF 10 MIN: CPT | Performed by: PHYSICIAN ASSISTANT

## 2023-11-10 RX ORDER — FUROSEMIDE 20 MG
20 TABLET ORAL DAILY PRN
Qty: 30 TABLET | Refills: 5 | Status: SHIPPED | OUTPATIENT
Start: 2023-11-10

## 2023-11-10 NOTE — PATIENT INSTRUCTIONS
Luz Elena - it was good to see you today!    Reviewed echo (ultrasound) showing perfect pumping function of heart/good strong heart pump. No significant valve issues  Blood work on the furosemide looked good!     PLAN:  Limit the salt in your diet  OK to stop taking the new furosemide every day and instead take it ONLY when feeling bloated/swollen OR if you gain >3 pounds in 24 hours or >5 pounds in 1 week!    3. See us back 1 year with EKG for the flecainide    [phone]

## 2023-11-10 NOTE — LETTER
"11/10/2023    Physician No Ref-Primary  No address on file    RE: Luz Elena Caballero       Dear Colleague,     I had the pleasure of seeing Patrickaugustinelorinuno Caballero in the [facility].  [facility]    I had the pleasure of seeing Luz Elena when she came for follow up of AFib.  This 63 year old previously saw Dr. Quezada for her history of:    1.AFib - a/w cardiomyopathy 7/2018, s/p RANDY/DCCV  7/2018. Started on flecainide with repeat DCCV 8/2018.   2.Tachycardia-induced CM - LVEF initially 30-35% at time of RANDY/DCCV 7/2018, along with significant MR and TR. LVEF and valves normal on echo 2021        Last Visit & Interval History:  Dr. Quezada saw her 12/2021 at which time she was doing well without recurrent AFib on flecainide therapy. No changes were made and annual follow-up recommended. I saw her just last month 10/17 at which itme she'd noted increasing abdominal fullness a/w non-exertional chest tightness.  I recommended Lasix 20 mg daily, updated echo and labs.     Echo 10/31 looked stable, with only mild valvular abnls and nl LVEF. Labs noted below look OK.    Today's Visit:  Since starting Lasix 20 mg daily on 1017, she really feels better, noting she is \"back to normal.\"  She denies any abdominal bloating/discomfort.  She noted about 5-6 pound weight loss at home (weighs daily).    No issues with lightheadedness or dizziness.  Feels she is tolerating this medicine without any issues.    VITALS:  Vitals: /79   Pulse 69   Ht 1.626 m (5' 4\")   Wt 80.7 kg (177 lb 14.4 oz)   SpO2 97%   BMI 30.54 kg/m      Diagnostic Testing:  Echo 10/31/2023 LVEF 60-65%. Nl RV. Trace MR. Trace TR.   EKG 10/2023 showed SR 75 bpm with QRS duration 98 ms on flecainide 150 mg BID and Metoprolol Xl 25 mg daily  Echocardiogram 12/2021 - LVEF 60-65%. Nl RV. No sig valve dz  Echo 7/2018 LVEF 45-50% with mod dilation of RV and mod decreased RVSF. 4+MR, 3-4+TR. RAP >20mmHg.   Component      Latest Ref " "Rng 11/16/2020  1:43 PM 10/31/2023  8:52 AM   TSH      0.40 - 4.00 mU/L 1.97     TSH      0.30 - 4.20 uIU/mL  2.74      Component      Latest Ref Rn 11/16/2020  1:43 PM 12/15/2021  1:20 PM 10/31/2023  8:52 AM   Sodium      135 - 145 mmol/L 139  140  138    Potassium      3.4 - 5.3 mmol/L 3.8  4.2  4.0   Chloride      94 - 109 mmol/L 105  107  103   Carbon Dioxide      20 - 32 mmol/L 28  29  25   Anion Gap      7 - 15 mmol/L 6  4  10    Glucose      70 - 99 mg/dL 130 (H)  77  134   Urea Nitrogen      7 - 30 mg/dL 16  17  15.6   Creatinine      0.51 - 0.95 mg/dL 0.88  0.85  0.80    GFR Estimate      >60 mL/min/1.73m2 72  75  83    GFR Estimate If Black      >60 mL/min/ 83      Calcium      8.8 - 10.2 mg/dL 9.0  9.4  9.2       Component      Latest Ref Rn 10/31/2023  8:52 AM   WBC      4.0 - 11.0 10e3/uL 7.8    RBC Count      3.80 - 5.20 10e6/uL 4.52    Hemoglobin      11.7 - 15.7 g/dL 13.5    Hematocrit      35.0 - 47.0 % 40.5    MCV      78 - 100 fL 90    MCH      26.5 - 33.0 pg 29.9    MCHC      31.5 - 36.5 g/dL 33.3    RDW      10.0 - 15.0 % 11.7    Platelet Count      150 - 450 10e3/uL 258        Component      Latest Ref Rn 9/7/2018  11:30 AM 11/5/2018  10:12 AM 10/31/2023  8:52 AM   N-Terminal Pro Bnp      0 - 900 pg/mL 244 (H)  225 (H)  351       Plan:  Decrease furosemide to 20 mg daily PRN for weight gain or increased bloating  See us back Fall 2024 with EKG for flecainide      Assessment/Plan:    Persistent AFib  Remains on flecainide (started 2018) with good results.  Feels like palpitations have improved since I saw her last month  EKG 10/17/2023 showed nl rhythm and intervals on flecainide  CHADSVASc now 1 (sex). No AC    PLAN:  See us back 1 year  No monitor ordered today given very occasional episodes of these but will continue to evaluate    Belly \"bloating\"  On exam 10/17, lungs were clear and no edema but + JVD and HJR. Belly seemed firm  Lasix started 10/17. Blood work 10/31 as above looked " fine  Echo 10/31 with nl LVEF and valves  Thinks Lasix really has worked well.  She is down 5 pounds, and belly is softer    PLAN:  Discussed options, including continued daily furosemide therapy VS changing furosemide to 20 mg daily PRN for weight gain >3#/24 hours or >5#/1 week or increased belly bloating.  She would like to start taking just PRN dosing.         Melinda Shah PA-C, MSPAS      Orders Placed This Encounter   Procedures    Follow-Up with Cardiology MELIA     Orders Placed This Encounter   Medications    furosemide (LASIX) 20 MG tablet     Sig: Take 1 tablet (20 mg) by mouth daily as needed (weight gain, bloating)     Dispense:  30 tablet     Refill:  5     Do not fill - just changed to PRN     Medications Discontinued During This Encounter   Medication Reason    furosemide (LASIX) 20 MG tablet Reorder (No AVS)           Encounter Diagnoses   Name Primary?    Paroxysmal atrial fibrillation (H)     Acute on chronic systolic congestive heart failure (H)          CURRENT MEDICATIONS:  Current Outpatient Medications   Medication Sig Dispense Refill    albuterol (PROAIR HFA/PROVENTIL HFA/VENTOLIN HFA) 108 (90 Base) MCG/ACT Inhaler Inhale 2 puffs into the lungs every 6 hours as needed for shortness of breath / dyspnea or wheezing 3 Inhaler 1    bismuth subsalicylate (PEPTO BISMOL) 262 MG/15ML suspension Take 15 mLs by mouth every 6 hours as needed for indigestion      flecainide (TAMBOCOR) 150 MG tablet Take 1 tablet (150 mg) by mouth every 12 hours 180 tablet 0    furosemide (LASIX) 20 MG tablet Take 1 tablet (20 mg) by mouth daily as needed (weight gain, bloating) 30 tablet 5    metoprolol succinate ER (TOPROL XL) 25 MG 24 hr tablet Take 1 tablet (25 mg) by mouth daily 90 tablet 0    omeprazole (PRILOSEC) 20 MG DR capsule Take 20 mg by mouth daily         ALLERGIES   No Known Allergies      Review of Systems:  Skin:  Negative     Eyes:  Negative    ENT:  Negative    Respiratory:  Negative for shortness of  "breath;dyspnea on exertion;cough  Cardiovascular:  Negative for;lightheadedness;dizziness;syncope or near-syncope;fatigue;edema;palpitations;chest pain    Gastroenterology: Negative    Genitourinary:  Negative    Musculoskeletal:  Negative    Neurologic:  Negative    Psychiatric:  Negative    Heme/Lymph/Imm:  Negative    Endocrine:  Negative      Physical Exam:  Vitals: /79   Pulse 69   Ht 1.626 m (5' 4\")   Wt 80.7 kg (177 lb 14.4 oz)   SpO2 97%   BMI 30.54 kg/m      Constitutional:  cooperative, alert and oriented, well developed, well nourished, in no acute distress        Skin:  not assessed this visit        Head:  not assessed this visit        Eyes:  pupils equal and round        ENT:  not assessed this visit        Neck:  not assessed this visit        Chest:  not assessed this visit        Cardiac: regular rhythm                  Abdomen:           Vascular: not assessed this visit                                      Extremities and Back:  no edema;no deformities, clubbing, cyanosis, erythema observed        Neurological:  no gross motor deficits            PAST MEDICAL HISTORY:  Past Medical History:   Diagnosis Date    Hyperthyroidism     Persistent atrial fibrillation (H)        PAST SURGICAL HISTORY:  Past Surgical History:   Procedure Laterality Date    CHOLECYSTECTOMY         FAMILY HISTORY:  Family History   Problem Relation Age of Onset    Lung Cancer Mother     Lung Cancer Father     Cerebrovascular Disease Brother     Family History Negative Brother     Family History Negative Brother        SOCIAL HISTORY:  Social History     Socioeconomic History    Marital status: Patient Declined     Spouse name: None    Number of children: None    Years of education: None    Highest education level: None   Tobacco Use    Smoking status: Never    Smokeless tobacco: Never   Substance and Sexual Activity    Alcohol use: No    Drug use: No    Sexual activity: Yes     Partners: Male   Other Topics " Concern    Parent/sibling w/ CABG, MI or angioplasty before 65F 55M? No               Thank you for allowing me to participate in the care of your patient.      Sincerely,     Hali Shah PA-C     [facility]  cc:   Hali Shah PA-C  [address]  [place] [address]

## 2023-11-27 DIAGNOSIS — I50.23 ACUTE ON CHRONIC SYSTOLIC CONGESTIVE HEART FAILURE (H): ICD-10-CM

## 2023-11-27 DIAGNOSIS — I48.0 PAROXYSMAL ATRIAL FIBRILLATION (H): ICD-10-CM

## 2023-11-27 RX ORDER — METOPROLOL SUCCINATE 25 MG/1
25 TABLET, EXTENDED RELEASE ORAL DAILY
Qty: 90 TABLET | Refills: 4 | Status: SHIPPED | OUTPATIENT
Start: 2023-11-27

## 2023-11-27 RX ORDER — FLECAINIDE ACETATE 150 MG/1
150 TABLET ORAL EVERY 12 HOURS
Qty: 180 TABLET | Refills: 4 | Status: SHIPPED | OUTPATIENT
Start: 2023-11-27

## 2024-12-02 ENCOUNTER — TELEPHONE (OUTPATIENT)
Dept: CARDIOLOGY | Facility: CLINIC | Age: 64
End: 2024-12-02

## 2024-12-02 DIAGNOSIS — I50.23 ACUTE ON CHRONIC SYSTOLIC CONGESTIVE HEART FAILURE (H): ICD-10-CM

## 2024-12-02 DIAGNOSIS — I48.0 PAROXYSMAL ATRIAL FIBRILLATION (H): ICD-10-CM

## 2024-12-02 RX ORDER — FUROSEMIDE 20 MG/1
20 TABLET ORAL DAILY PRN
Qty: 30 TABLET | Refills: 1 | Status: SHIPPED | OUTPATIENT
Start: 2024-12-02

## 2024-12-02 RX ORDER — FLECAINIDE ACETATE 150 MG/1
150 TABLET ORAL EVERY 12 HOURS
Qty: 180 TABLET | Refills: 0 | Status: SHIPPED | OUTPATIENT
Start: 2024-12-02

## 2024-12-02 RX ORDER — METOPROLOL SUCCINATE 25 MG/1
25 TABLET, EXTENDED RELEASE ORAL DAILY
Qty: 90 TABLET | Refills: 0 | Status: SHIPPED | OUTPATIENT
Start: 2024-12-02

## 2024-12-02 NOTE — TELEPHONE ENCOUNTER
[facility] Call Center    Phone Message    May a detailed message be left on voicemail: yes     Reason for Call: Medication Refill Request    Has the patient contacted the pharmacy for the refill? Yes   Name of medication being requested: flecainide, metoprolol & furosemide  Provider who prescribed the medication: Melinda Shah  Pharmacy:   [id number] [place], [place] - [address]     Date medication is needed: ASAP- pt will be out tomorrow.  Please send refills ASAP.    Action Taken: Other: cardio    Travel Screening: Not Applicable     Date of Service:

## 2025-03-03 DIAGNOSIS — I50.23 ACUTE ON CHRONIC SYSTOLIC CONGESTIVE HEART FAILURE (H): ICD-10-CM

## 2025-03-03 DIAGNOSIS — I48.0 PAROXYSMAL ATRIAL FIBRILLATION (H): ICD-10-CM

## 2025-03-03 RX ORDER — FLECAINIDE ACETATE 150 MG/1
150 TABLET ORAL EVERY 12 HOURS
Qty: 180 TABLET | Refills: 0 | Status: SHIPPED | OUTPATIENT
Start: 2025-03-03

## 2025-03-03 RX ORDER — METOPROLOL SUCCINATE 25 MG/1
25 TABLET, EXTENDED RELEASE ORAL DAILY
Qty: 90 TABLET | Refills: 0 | Status: SHIPPED | OUTPATIENT
Start: 2025-03-03

## 2025-06-04 DIAGNOSIS — I50.23 ACUTE ON CHRONIC SYSTOLIC CONGESTIVE HEART FAILURE (H): ICD-10-CM

## 2025-06-04 DIAGNOSIS — I48.0 PAROXYSMAL ATRIAL FIBRILLATION (H): ICD-10-CM

## 2025-06-04 RX ORDER — FLECAINIDE ACETATE 150 MG/1
150 TABLET ORAL EVERY 12 HOURS
Qty: 60 TABLET | Refills: 0 | Status: SHIPPED | OUTPATIENT
Start: 2025-06-04

## 2025-06-04 RX ORDER — METOPROLOL SUCCINATE 25 MG/1
25 TABLET, EXTENDED RELEASE ORAL DAILY
Qty: 30 TABLET | Refills: 0 | Status: SHIPPED | OUTPATIENT
Start: 2025-06-04

## 2025-07-02 DIAGNOSIS — I50.23 ACUTE ON CHRONIC SYSTOLIC CONGESTIVE HEART FAILURE (H): ICD-10-CM

## 2025-07-02 DIAGNOSIS — I48.0 PAROXYSMAL ATRIAL FIBRILLATION (H): ICD-10-CM

## 2025-07-02 RX ORDER — METOPROLOL SUCCINATE 25 MG/1
25 TABLET, EXTENDED RELEASE ORAL DAILY
Qty: 90 TABLET | Refills: 0 | Status: SHIPPED | OUTPATIENT
Start: 2025-07-02

## 2025-07-02 RX ORDER — FLECAINIDE ACETATE 150 MG/1
150 TABLET ORAL EVERY 12 HOURS
Qty: 180 TABLET | Refills: 0 | Status: SHIPPED | OUTPATIENT
Start: 2025-07-02

## (undated) RX ORDER — FENTANYL CITRATE 50 UG/ML
INJECTION, SOLUTION INTRAMUSCULAR; INTRAVENOUS
Status: DISPENSED
Start: 2018-07-30

## (undated) RX ORDER — LIDOCAINE HYDROCHLORIDE 40 MG/ML
SOLUTION TOPICAL
Status: DISPENSED
Start: 2018-07-30

## (undated) RX ORDER — GLYCOPYRROLATE 0.2 MG/ML
INJECTION, SOLUTION INTRAMUSCULAR; INTRAVENOUS
Status: DISPENSED
Start: 2018-07-30

## (undated) RX ORDER — NALOXONE HYDROCHLORIDE 0.4 MG/ML
INJECTION, SOLUTION INTRAMUSCULAR; INTRAVENOUS; SUBCUTANEOUS
Status: DISPENSED
Start: 2018-07-30

## (undated) RX ORDER — FLUMAZENIL 0.1 MG/ML
INJECTION, SOLUTION INTRAVENOUS
Status: DISPENSED
Start: 2018-07-30